# Patient Record
Sex: FEMALE | Race: WHITE | NOT HISPANIC OR LATINO | Employment: UNEMPLOYED | URBAN - NONMETROPOLITAN AREA
[De-identification: names, ages, dates, MRNs, and addresses within clinical notes are randomized per-mention and may not be internally consistent; named-entity substitution may affect disease eponyms.]

---

## 2017-01-10 ENCOUNTER — APPOINTMENT (EMERGENCY)
Dept: RADIOLOGY | Facility: HOSPITAL | Age: 42
End: 2017-01-10
Payer: COMMERCIAL

## 2017-01-10 ENCOUNTER — HOSPITAL ENCOUNTER (EMERGENCY)
Facility: HOSPITAL | Age: 42
Discharge: HOME | End: 2017-01-10
Attending: EMERGENCY MEDICINE | Admitting: EMERGENCY MEDICINE
Payer: COMMERCIAL

## 2017-01-10 VITALS
OXYGEN SATURATION: 97 % | BODY MASS INDEX: 28.49 KG/M2 | TEMPERATURE: 98.9 F | HEART RATE: 97 BPM | HEIGHT: 65 IN | RESPIRATION RATE: 17 BRPM | DIASTOLIC BLOOD PRESSURE: 81 MMHG | SYSTOLIC BLOOD PRESSURE: 134 MMHG | WEIGHT: 171 LBS
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
## 2017-01-10 DIAGNOSIS — M54.50 ACUTE LOW BACK PAIN: Primary | ICD-10-CM

## 2017-01-10 PROCEDURE — 99283 EMERGENCY DEPT VISIT LOW MDM: CPT

## 2017-01-10 PROCEDURE — 72220 X-RAY EXAM SACRUM TAILBONE: CPT

## 2017-01-10 PROCEDURE — 72100 X-RAY EXAM L-S SPINE 2/3 VWS: CPT

## 2017-01-10 RX ORDER — DIAZEPAM 5 MG/1
5 TABLET ORAL
Qty: 10 TABLET | Refills: 0 | Status: SHIPPED | OUTPATIENT
Start: 2017-01-10 | End: 2017-01-15 | Stop reason: ALTCHOICE

## 2017-01-10 RX ORDER — TRAMADOL HYDROCHLORIDE 50 MG/1
50 TABLET ORAL EVERY 6 HOURS PRN
Qty: 30 TABLET | Refills: 0 | Status: SHIPPED | OUTPATIENT
Start: 2017-01-10 | End: 2017-01-20

## 2017-01-10 RX ORDER — PREDNISONE 10 MG/1
40 TABLET ORAL DAILY
Qty: 20 TABLET | Refills: 0 | Status: SHIPPED | OUTPATIENT
Start: 2017-01-10 | End: 2017-01-15

## 2017-01-10 RX ORDER — TRAMADOL HYDROCHLORIDE 50 MG/1
100 TABLET ORAL ONCE
Status: COMPLETED | OUTPATIENT
Start: 2017-01-10 | End: 2017-01-10

## 2017-01-10 RX ADMIN — TRAMADOL HYDROCHLORIDE 100 MG: 50 TABLET, COATED ORAL at 10:59

## 2017-02-09 ENCOUNTER — HOSPITAL ENCOUNTER (EMERGENCY)
Facility: HOSPITAL | Age: 42
Discharge: HOME | End: 2017-02-09
Attending: EMERGENCY MEDICINE | Admitting: EMERGENCY MEDICINE
Payer: COMMERCIAL

## 2017-02-09 VITALS
SYSTOLIC BLOOD PRESSURE: 154 MMHG | DIASTOLIC BLOOD PRESSURE: 89 MMHG | RESPIRATION RATE: 18 BRPM | HEART RATE: 92 BPM | BODY MASS INDEX: 27.46 KG/M2 | OXYGEN SATURATION: 100 % | WEIGHT: 165 LBS | TEMPERATURE: 99 F

## 2017-02-09 DIAGNOSIS — J40 BRONCHITIS: Primary | ICD-10-CM

## 2017-02-09 PROCEDURE — 99283 EMERGENCY DEPT VISIT LOW MDM: CPT

## 2017-02-09 RX ORDER — TRAMADOL HYDROCHLORIDE 50 MG/1
50 TABLET ORAL EVERY 6 HOURS PRN
Qty: 15 TABLET | Refills: 0 | Status: SHIPPED | OUTPATIENT
Start: 2017-02-09 | End: 2017-04-14 | Stop reason: ALTCHOICE

## 2017-02-09 RX ORDER — DIAZEPAM 5 MG/1
5 TABLET ORAL 2 TIMES DAILY
Qty: 15 TABLET | Refills: 0 | Status: SHIPPED | OUTPATIENT
Start: 2017-02-09 | End: 2018-02-03

## 2017-02-09 RX ORDER — GUAIFENESIN 200 MG/1
200 TABLET ORAL EVERY 4 HOURS PRN
Qty: 15 TABLET | Refills: 0 | Status: SHIPPED | OUTPATIENT
Start: 2017-02-09 | End: 2017-04-14 | Stop reason: ALTCHOICE

## 2017-02-09 RX ORDER — AZITHROMYCIN 250 MG/1
TABLET, FILM COATED ORAL
Qty: 6 TABLET | Refills: 0 | Status: SHIPPED | OUTPATIENT
Start: 2017-02-09 | End: 2017-04-14 | Stop reason: ALTCHOICE

## 2017-04-14 ENCOUNTER — APPOINTMENT (EMERGENCY)
Dept: CT IMAGING | Facility: HOSPITAL | Age: 42
End: 2017-04-14
Payer: COMMERCIAL

## 2017-04-14 ENCOUNTER — HOSPITAL ENCOUNTER (EMERGENCY)
Facility: HOSPITAL | Age: 42
Discharge: HOME | End: 2017-04-14
Admitting: EMERGENCY MEDICINE
Payer: COMMERCIAL

## 2017-04-14 VITALS
BODY MASS INDEX: 29.99 KG/M2 | SYSTOLIC BLOOD PRESSURE: 127 MMHG | HEIGHT: 65 IN | HEART RATE: 105 BPM | TEMPERATURE: 98.7 F | WEIGHT: 180 LBS | DIASTOLIC BLOOD PRESSURE: 69 MMHG | OXYGEN SATURATION: 100 % | RESPIRATION RATE: 20 BRPM

## 2017-04-14 DIAGNOSIS — R35.0 URINARY FREQUENCY: Primary | ICD-10-CM

## 2017-04-14 LAB
ANION GAP SERPL CALCULATED.3IONS-SCNC: 8 MMOL/L (ref 4–13)
APTT PPP: 24 SECONDS (ref 24–36)
BACTERIA UR QL AUTO: ABNORMAL /HPF
BASOPHILS # BLD AUTO: 0.06 THOUSANDS/ΜL (ref 0–0.1)
BASOPHILS NFR BLD AUTO: 1 % (ref 0–1)
BILIRUB UR QL STRIP: NEGATIVE
BUN SERPL-MCNC: 10 MG/DL (ref 5–25)
CALCIUM SERPL-MCNC: 8.7 MG/DL (ref 8.3–10.1)
CHLORIDE SERPL-SCNC: 106 MMOL/L (ref 100–108)
CLARITY UR: ABNORMAL
CO2 SERPL-SCNC: 28 MMOL/L (ref 21–32)
COLOR UR: YELLOW
CREAT SERPL-MCNC: 0.66 MG/DL (ref 0.6–1.3)
EOSINOPHIL # BLD AUTO: 0.29 THOUSAND/ΜL (ref 0–0.61)
EOSINOPHIL NFR BLD AUTO: 4 % (ref 0–6)
ERYTHROCYTE [DISTWIDTH] IN BLOOD BY AUTOMATED COUNT: 12.7 % (ref 11.6–15.1)
GFR SERPL CREATININE-BSD FRML MDRD: >60 ML/MIN/1.73SQ M
GLUCOSE SERPL-MCNC: 109 MG/DL (ref 65–140)
GLUCOSE UR STRIP-MCNC: NEGATIVE MG/DL
HCG UR QL: NORMAL
HCT VFR BLD AUTO: 37 % (ref 34.8–46.1)
HGB BLD-MCNC: 12.6 G/DL (ref 11.5–15.4)
HGB UR QL STRIP.AUTO: ABNORMAL
INR PPP: 1 (ref 0.86–1.16)
KETONES UR STRIP-MCNC: NEGATIVE MG/DL
LACTATE SERPL-SCNC: 1.6 MMOL/L (ref 0.5–2)
LEUKOCYTE ESTERASE UR QL STRIP: NEGATIVE
LYMPHOCYTES # BLD AUTO: 2.88 THOUSANDS/ΜL (ref 0.6–4.47)
LYMPHOCYTES NFR BLD AUTO: 36 % (ref 14–44)
MCH RBC QN AUTO: 33.3 PG (ref 26.8–34.3)
MCHC RBC AUTO-ENTMCNC: 34.1 G/DL (ref 31.4–37.4)
MCV RBC AUTO: 98 FL (ref 82–98)
MONOCYTES # BLD AUTO: 0.53 THOUSAND/ΜL (ref 0.17–1.22)
MONOCYTES NFR BLD AUTO: 7 % (ref 4–12)
NEUTROPHILS # BLD AUTO: 4.21 THOUSANDS/ΜL (ref 1.85–7.62)
NEUTS SEG NFR BLD AUTO: 52 % (ref 43–75)
NITRITE UR QL STRIP: NEGATIVE
NON-SQ EPI CELLS URNS QL MICRO: ABNORMAL /HPF
PH UR STRIP.AUTO: 6 [PH] (ref 4.5–8)
PLATELET # BLD AUTO: 263 THOUSANDS/UL (ref 149–390)
PMV BLD AUTO: 10.4 FL (ref 8.9–12.7)
POTASSIUM SERPL-SCNC: 3.2 MMOL/L (ref 3.5–5.3)
PROT UR STRIP-MCNC: NEGATIVE MG/DL
PROTHROMBIN TIME: 13.1 SECONDS (ref 12–14.3)
RBC # BLD AUTO: 3.78 MILLION/UL (ref 3.81–5.12)
RBC #/AREA URNS AUTO: ABNORMAL /HPF
SODIUM SERPL-SCNC: 142 MMOL/L (ref 136–145)
SP GR UR STRIP.AUTO: 1.02 (ref 1–1.03)
UROBILINOGEN UR QL STRIP.AUTO: 0.2 E.U./DL
WBC # BLD AUTO: 7.97 THOUSAND/UL (ref 4.31–10.16)
WBC #/AREA URNS AUTO: ABNORMAL /HPF

## 2017-04-14 PROCEDURE — 87086 URINE CULTURE/COLONY COUNT: CPT | Performed by: PHYSICIAN ASSISTANT

## 2017-04-14 PROCEDURE — 85025 COMPLETE CBC W/AUTO DIFF WBC: CPT | Performed by: PHYSICIAN ASSISTANT

## 2017-04-14 PROCEDURE — 99284 EMERGENCY DEPT VISIT MOD MDM: CPT

## 2017-04-14 PROCEDURE — 80048 BASIC METABOLIC PNL TOTAL CA: CPT | Performed by: PHYSICIAN ASSISTANT

## 2017-04-14 PROCEDURE — 85730 THROMBOPLASTIN TIME PARTIAL: CPT | Performed by: PHYSICIAN ASSISTANT

## 2017-04-14 PROCEDURE — 96361 HYDRATE IV INFUSION ADD-ON: CPT

## 2017-04-14 PROCEDURE — 81001 URINALYSIS AUTO W/SCOPE: CPT | Performed by: PHYSICIAN ASSISTANT

## 2017-04-14 PROCEDURE — 83605 ASSAY OF LACTIC ACID: CPT | Performed by: PHYSICIAN ASSISTANT

## 2017-04-14 PROCEDURE — 85610 PROTHROMBIN TIME: CPT | Performed by: PHYSICIAN ASSISTANT

## 2017-04-14 PROCEDURE — 36415 COLL VENOUS BLD VENIPUNCTURE: CPT | Performed by: PHYSICIAN ASSISTANT

## 2017-04-14 PROCEDURE — 81025 URINE PREGNANCY TEST: CPT | Performed by: PHYSICIAN ASSISTANT

## 2017-04-14 PROCEDURE — 74176 CT ABD & PELVIS W/O CONTRAST: CPT

## 2017-04-14 PROCEDURE — 96374 THER/PROPH/DIAG INJ IV PUSH: CPT

## 2017-04-14 RX ORDER — POTASSIUM CHLORIDE 20 MEQ/1
20 TABLET, EXTENDED RELEASE ORAL ONCE
Status: COMPLETED | OUTPATIENT
Start: 2017-04-14 | End: 2017-04-14

## 2017-04-14 RX ORDER — IBUPROFEN 200 MG
400 TABLET ORAL ONCE
Status: COMPLETED | OUTPATIENT
Start: 2017-04-14 | End: 2017-04-14

## 2017-04-14 RX ORDER — ONDANSETRON 2 MG/ML
4 INJECTION INTRAMUSCULAR; INTRAVENOUS ONCE
Status: COMPLETED | OUTPATIENT
Start: 2017-04-14 | End: 2017-04-14

## 2017-04-14 RX ORDER — PHENAZOPYRIDINE HYDROCHLORIDE 100 MG/1
100 TABLET, FILM COATED ORAL ONCE
Status: COMPLETED | OUTPATIENT
Start: 2017-04-14 | End: 2017-04-14

## 2017-04-14 RX ORDER — NITROFURANTOIN 25; 75 MG/1; MG/1
100 CAPSULE ORAL 2 TIMES DAILY
Qty: 10 CAPSULE | Refills: 0 | Status: SHIPPED | OUTPATIENT
Start: 2017-04-14 | End: 2017-04-19

## 2017-04-14 RX ORDER — PHENAZOPYRIDINE HYDROCHLORIDE 100 MG/1
100 TABLET, FILM COATED ORAL 3 TIMES DAILY PRN
Qty: 6 TABLET | Refills: 0 | Status: SHIPPED | OUTPATIENT
Start: 2017-04-14 | End: 2017-04-16

## 2017-04-14 RX ADMIN — POTASSIUM CHLORIDE 20 MEQ: 1500 TABLET, EXTENDED RELEASE ORAL at 17:46

## 2017-04-14 RX ADMIN — SODIUM CHLORIDE 1000 ML: 0.9 INJECTION, SOLUTION INTRAVENOUS at 17:03

## 2017-04-14 RX ADMIN — PHENAZOPYRIDINE HYDROCHLORIDE 100 MG: 100 TABLET ORAL at 17:06

## 2017-04-14 RX ADMIN — ONDANSETRON 4 MG: 2 INJECTION INTRAMUSCULAR; INTRAVENOUS at 17:06

## 2017-04-14 RX ADMIN — IBUPROFEN 400 MG: 200 TABLET, FILM COATED ORAL at 17:07

## 2017-04-16 LAB — BACTERIA UR CULT: NORMAL

## 2018-02-03 ENCOUNTER — HOSPITAL ENCOUNTER (EMERGENCY)
Facility: HOSPITAL | Age: 43
Discharge: HOME | End: 2018-02-03
Attending: EMERGENCY MEDICINE | Admitting: EMERGENCY MEDICINE
Payer: COMMERCIAL

## 2018-02-03 VITALS
TEMPERATURE: 98 F | RESPIRATION RATE: 18 BRPM | OXYGEN SATURATION: 100 % | BODY MASS INDEX: 31.99 KG/M2 | DIASTOLIC BLOOD PRESSURE: 70 MMHG | HEART RATE: 82 BPM | SYSTOLIC BLOOD PRESSURE: 126 MMHG | HEIGHT: 65 IN | WEIGHT: 192 LBS

## 2018-02-03 DIAGNOSIS — R11.2 NAUSEA VOMITING AND DIARRHEA: Primary | ICD-10-CM

## 2018-02-03 DIAGNOSIS — R19.7 NAUSEA VOMITING AND DIARRHEA: Primary | ICD-10-CM

## 2018-02-03 LAB
ALBUMIN SERPL BCP-MCNC: 3.5 G/DL (ref 3.5–5)
ALP SERPL-CCNC: 46 U/L (ref 46–116)
ALT SERPL W P-5'-P-CCNC: 31 U/L (ref 12–78)
ANION GAP SERPL CALCULATED.3IONS-SCNC: 12 MMOL/L (ref 4–13)
AST SERPL W P-5'-P-CCNC: 18 U/L (ref 5–45)
BACTERIA UR QL AUTO: ABNORMAL /HPF
BASOPHILS # BLD AUTO: 0.05 THOUSANDS/ΜL (ref 0–0.1)
BASOPHILS NFR BLD AUTO: 1 % (ref 0–1)
BILIRUB SERPL-MCNC: 0.3 MG/DL (ref 0.2–1)
BILIRUB UR QL STRIP: NEGATIVE
BUN SERPL-MCNC: 9 MG/DL (ref 5–25)
CALCIUM SERPL-MCNC: 8.4 MG/DL (ref 8.3–10.1)
CHLORIDE SERPL-SCNC: 103 MMOL/L (ref 100–108)
CLARITY UR: NORMAL
CO2 SERPL-SCNC: 23 MMOL/L (ref 21–32)
COLOR UR: YELLOW
CREAT SERPL-MCNC: 0.76 MG/DL (ref 0.6–1.3)
EOSINOPHIL # BLD AUTO: 0.22 THOUSAND/ΜL (ref 0–0.61)
EOSINOPHIL NFR BLD AUTO: 2 % (ref 0–6)
ERYTHROCYTE [DISTWIDTH] IN BLOOD BY AUTOMATED COUNT: 12.3 % (ref 11.6–15.1)
EXT PREG TEST URINE: NEGATIVE
GFR SERPL CREATININE-BSD FRML MDRD: 97 ML/MIN/1.73SQ M
GLUCOSE SERPL-MCNC: 87 MG/DL (ref 65–140)
GLUCOSE UR STRIP-MCNC: NEGATIVE MG/DL
HCT VFR BLD AUTO: 39.3 % (ref 34.8–46.1)
HGB BLD-MCNC: 13.8 G/DL (ref 11.5–15.4)
HGB UR QL STRIP.AUTO: NORMAL
KETONES UR STRIP-MCNC: NEGATIVE MG/DL
LEUKOCYTE ESTERASE UR QL STRIP: NEGATIVE
LYMPHOCYTES # BLD AUTO: 2.94 THOUSANDS/ΜL (ref 0.6–4.47)
LYMPHOCYTES NFR BLD AUTO: 31 % (ref 14–44)
MAGNESIUM SERPL-MCNC: 1.8 MG/DL (ref 1.6–2.6)
MCH RBC QN AUTO: 33 PG (ref 26.8–34.3)
MCHC RBC AUTO-ENTMCNC: 35.1 G/DL (ref 31.4–37.4)
MCV RBC AUTO: 94 FL (ref 82–98)
MONOCYTES # BLD AUTO: 0.51 THOUSAND/ΜL (ref 0.17–1.22)
MONOCYTES NFR BLD AUTO: 5 % (ref 4–12)
NEUTROPHILS # BLD AUTO: 5.83 THOUSANDS/ΜL (ref 1.85–7.62)
NEUTS SEG NFR BLD AUTO: 61 % (ref 43–75)
NITRITE UR QL STRIP: NEGATIVE
NON-SQ EPI CELLS URNS QL MICRO: ABNORMAL /HPF
PH UR STRIP.AUTO: 6 [PH] (ref 4.5–8)
PLATELET # BLD AUTO: 254 THOUSANDS/UL (ref 149–390)
PMV BLD AUTO: 10.3 FL (ref 8.9–12.7)
POTASSIUM SERPL-SCNC: 3.9 MMOL/L (ref 3.5–5.3)
PROT SERPL-MCNC: 7 G/DL (ref 6.4–8.2)
PROT UR STRIP-MCNC: NEGATIVE MG/DL
RBC # BLD AUTO: 4.18 MILLION/UL (ref 3.81–5.12)
RBC #/AREA URNS AUTO: ABNORMAL /HPF
SODIUM SERPL-SCNC: 138 MMOL/L (ref 136–145)
SP GR UR STRIP.AUTO: 1.02 (ref 1–1.03)
UROBILINOGEN UR QL STRIP.AUTO: 0.2 E.U./DL
WBC # BLD AUTO: 9.55 THOUSAND/UL (ref 4.31–10.16)
WBC #/AREA URNS AUTO: ABNORMAL /HPF

## 2018-02-03 PROCEDURE — 96374 THER/PROPH/DIAG INJ IV PUSH: CPT

## 2018-02-03 PROCEDURE — 36415 COLL VENOUS BLD VENIPUNCTURE: CPT | Performed by: EMERGENCY MEDICINE

## 2018-02-03 PROCEDURE — 80053 COMPREHEN METABOLIC PANEL: CPT | Performed by: EMERGENCY MEDICINE

## 2018-02-03 PROCEDURE — 99283 EMERGENCY DEPT VISIT LOW MDM: CPT

## 2018-02-03 PROCEDURE — 96361 HYDRATE IV INFUSION ADD-ON: CPT

## 2018-02-03 PROCEDURE — 85025 COMPLETE CBC W/AUTO DIFF WBC: CPT | Performed by: EMERGENCY MEDICINE

## 2018-02-03 PROCEDURE — 81001 URINALYSIS AUTO W/SCOPE: CPT | Performed by: EMERGENCY MEDICINE

## 2018-02-03 PROCEDURE — 81025 URINE PREGNANCY TEST: CPT | Performed by: EMERGENCY MEDICINE

## 2018-02-03 PROCEDURE — 83735 ASSAY OF MAGNESIUM: CPT | Performed by: EMERGENCY MEDICINE

## 2018-02-03 RX ORDER — ONDANSETRON 2 MG/ML
4 INJECTION INTRAMUSCULAR; INTRAVENOUS ONCE
Status: COMPLETED | OUTPATIENT
Start: 2018-02-03 | End: 2018-02-03

## 2018-02-03 RX ORDER — HYDROCODONE BITARTRATE AND ACETAMINOPHEN 10; 300 MG/1; MG/1
1 TABLET ORAL EVERY 6 HOURS PRN
COMMUNITY
End: 2018-09-10

## 2018-02-03 RX ORDER — DEXLANSOPRAZOLE 30 MG/1
30 CAPSULE, DELAYED RELEASE ORAL DAILY
COMMUNITY
End: 2019-12-27

## 2018-02-03 RX ORDER — ONDANSETRON 4 MG/1
4 TABLET, FILM COATED ORAL EVERY 12 HOURS PRN
Qty: 4 TABLET | Refills: 0 | Status: SHIPPED | OUTPATIENT
Start: 2018-02-03 | End: 2018-09-10

## 2018-02-03 RX ORDER — TRAMADOL HYDROCHLORIDE 50 MG/1
50 TABLET ORAL 2 TIMES DAILY
COMMUNITY
End: 2018-09-10

## 2018-02-03 RX ADMIN — SODIUM CHLORIDE 1000 ML: 0.9 INJECTION, SOLUTION INTRAVENOUS at 15:38

## 2018-02-03 RX ADMIN — ONDANSETRON HYDROCHLORIDE 4 MG: 2 INJECTION, SOLUTION INTRAVENOUS at 15:48

## 2018-02-03 NOTE — ED PROVIDER NOTES
History  Chief Complaint   Patient presents with    Vomiting     Pt states n/v/d since yesterday morning  Pt has not taken anything to help symptoms and believes she had a fever because she felt warm but had chills  Patient is a 80-year-old female with a past medical history of Hodgkin's of coma status post radiation chemo in remission since 2009 coming in today with nausea, vomiting, diarrhea 1-2 days  Patient was in her normal state health until that time  Patient reports the vomiting started 1st and then the diarrhea  She has vomited and had multiple episodes of diarrhea greater than 10-20 episodes  There has been subjective fevers and chills as well as myalgias  She denies any recent travel, sick contacts or recent antibiotic use  She denies any hematemesis, hemoptysis, bright red blood per rectum or melena  She has tried taking Gatorade and broth however vomits it right back up  She reports of diffuse abdominal discomfort that happened after multiple episodes of vomiting and diarrhea  She did not have any abdominal pain prior  She denies any chest pain or shortness of breath  She has been urinating well  No vaginal bleeding spotting or discharge          History provided by:  Patient   used: No    Vomiting   Severity:  Mild  Duration:  2 days  Timing:  Intermittent  Quality:  Stomach contents  Progression:  Unchanged  Chronicity:  New  Recent urination:  Normal  Context: not post-tussive and not self-induced    Relieved by:  Nothing  Worsened by:  Nothing  Ineffective treatments:  None tried  Associated symptoms: chills, diarrhea and myalgias    Associated symptoms: no abdominal pain, no arthralgias, no cough, no fever, no headaches, no sore throat and no URI    Risk factors: no alcohol use, no diabetes, not pregnant, no prior abdominal surgery, no sick contacts, no suspect food intake and no travel to endemic areas        Prior to Admission Medications   Prescriptions Last Dose Informant Patient Reported? Taking? HYDROcodone-acetaminophen (XODOL )  MG per tablet   Yes Yes   Sig: Take 1 tablet by mouth every 6 (six) hours as needed for moderate pain   dexlansoprazole (DEXILANT) 30 MG capsule   Yes Yes   Sig: Take 30 mg by mouth daily   traMADol (ULTRAM) 50 mg tablet   Yes Yes   Sig: Take 50 mg by mouth 2 (two) times a day      Facility-Administered Medications: None       Past Medical History:   Diagnosis Date    Hodgkin's lymphoma ([facility] [address] )     Migraine     Unspecified ovarian cysts        Past Surgical History:   Procedure Laterality Date    APPENDECTOMY      CHOLECYSTECTOMY      HYSTERECTOMY      LYMPH NODE BIOPSY      left neck    MEDIPORT INSERTION, SINGLE      MEDIPORT REMOVAL      TUBAL LIGATION         History reviewed  No pertinent family history  I have reviewed and agree with the history as documented  Social History   Substance Use Topics    Smoking status: Current Every Day Smoker     Packs/day: 0 50     Types: Cigarettes    Smokeless tobacco: Never Used    Alcohol use No        Review of Systems   Constitutional: Positive for chills  Negative for fever  HENT: Negative for sore throat  Respiratory: Negative for cough and wheezing  Cardiovascular: Negative for chest pain and palpitations  Gastrointestinal: Positive for diarrhea, nausea and vomiting  Negative for abdominal distention and abdominal pain  Genitourinary: Negative for frequency  Musculoskeletal: Positive for myalgias  Negative for arthralgias  Neurological: Negative for weakness and headaches  Psychiatric/Behavioral: Negative for confusion  All other systems reviewed and are negative        Physical Exam  ED Triage Vitals [02/03/18 1512]   Temperature Pulse Respirations Blood Pressure SpO2   98 °F (36 7 °C) 99 18 134/80 100 %      Temp Source Heart Rate Source Patient Position - Orthostatic VS BP Location FiO2 (%)   Temporal Monitor Sitting Right arm --      Pain Score       5           Orthostatic Vital Signs  Vitals:    02/03/18 1512   BP: 134/80   Pulse: 99   Patient Position - Orthostatic VS: Sitting       Physical Exam   Constitutional: She is oriented to person, place, and time  She appears well-developed and well-nourished  No distress  HENT:   Head: Normocephalic and atraumatic  Dry mucous membranes  Oropharynx clear  Uvula midline without any edema  Eyes: Conjunctivae and EOM are normal  Pupils are equal, round, and reactive to light  Neck: Normal range of motion  Neck supple  No tracheal deviation present  Cardiovascular: Regular rhythm, normal heart sounds and intact distal pulses  Tachycardia present  No murmur heard  Pulmonary/Chest: Effort normal and breath sounds normal  No stridor  No respiratory distress  She has no wheezes  She exhibits no tenderness  Abdominal: Soft  Bowel sounds are normal  She exhibits no distension and no mass  There is generalized tenderness  There is no rebound and no guarding  No hernia  Musculoskeletal: Normal range of motion  She exhibits no edema  Neurological: She is alert and oriented to person, place, and time  She displays normal reflexes  No cranial nerve deficit or sensory deficit  She exhibits normal muscle tone  Coordination normal    Skin: Skin is warm  Capillary refill takes less than 2 seconds  She is not diaphoretic  Psychiatric: She has a normal mood and affect  Her behavior is normal    Nursing note and vitals reviewed        ED Medications  Medications   sodium chloride 0 9 % bolus 1,000 mL (1,000 mL Intravenous New Bag 2/3/18 1538)   ondansetron (ZOFRAN) injection 4 mg (4 mg Intravenous Given 2/3/18 1548)       Diagnostic Studies  Results Reviewed     Procedure Component Value Units Date/Time    Comprehensive metabolic panel [[id number]] Collected:  02/03/18 1528    Lab Status:  Final result Specimen:  Blood from Arm, Left Updated:  02/03/18 1621     Sodium 138 mmol/L      Potassium 3 9 mmol/L Chloride 103 mmol/L      CO2 23 mmol/L      Anion Gap 12 mmol/L      BUN 9 mg/dL      Creatinine 0 76 mg/dL      Glucose 87 mg/dL      Calcium 8 4 mg/dL      AST 18 U/L      ALT 31 U/L      Alkaline Phosphatase 46 U/L      Total Protein 7 0 g/dL      Albumin 3 5 g/dL      Total Bilirubin 0 30 mg/dL      eGFR 97 ml/min/1 73sq m     Narrative:         National Kidney Disease Education Program recommendations are as follows:  GFR calculation is accurate only with a steady state creatinine  Chronic Kidney disease less than 60 ml/min/1 73 sq  meters  Kidney failure less than 15 ml/min/1 73 sq  meters      Magnesium [[id number]]  (Normal) Collected:  02/03/18 1528    Lab Status:  Final result Specimen:  Blood from Arm, Left Updated:  02/03/18 1621     Magnesium 1 8 mg/dL     Urine Microscopic [[id number]]  (Abnormal) Collected:  02/03/18 1558    Lab Status:  Final result Specimen:  Urine from Urine, Clean Catch Updated:  02/03/18 1609     RBC, UA 0-1 (A) /hpf      WBC, UA None Seen /hpf      Epithelial Cells Occasional /hpf      Bacteria, UA Occasional /hpf     UA w Reflex to Microscopic w Reflex to Culture [[id number]]  (Normal) Collected:  02/03/18 1558    Lab Status:  Final result Specimen:  Urine from Urine, Clean Catch Updated:  02/03/18 1608     Color, UA Yellow     Clarity, UA Slightly Cloudy     Specific Gravity, UA 1 025     pH, UA 6 0     Leukocytes, UA Negative     Nitrite, UA Negative     Protein, UA Negative mg/dl      Glucose, UA Negative mg/dl      Ketones, UA Negative mg/dl      Urobilinogen, UA 0 2 E U /dl      Bilirubin, UA Negative     Blood, UA Trace-Intact    CBC and differential [[id number]]  (Normal) Collected:  02/03/18 1528    Lab Status:  Final result Specimen:  Blood from Arm, Left Updated:  02/03/18 1607     WBC 9 55 Thousand/uL      RBC 4 18 Million/uL      Hemoglobin 13 8 g/dL      Hematocrit 39 3 %      MCV 94 fL      MCH 33 0 pg      MCHC 35 1 g/dL      RDW 12 3 %      MPV 10 3 fL Platelets 322 Thousands/uL      Neutrophils Relative 61 %      Lymphocytes Relative 31 %      Monocytes Relative 5 %      Eosinophils Relative 2 %      Basophils Relative 1 %      Neutrophils Absolute 5 83 Thousands/µL      Lymphocytes Absolute 2 94 Thousands/µL      Monocytes Absolute 0 51 Thousand/µL      Eosinophils Absolute 0 22 Thousand/µL      Basophils Absolute 0 05 Thousands/µL     POCT pregnancy, urine [[id number]]  (Normal) Resulted:  02/03/18 1555    Lab Status:  Final result Updated:  02/03/18 1556     EXT PREG TEST UR (Ref: Negative) negative                 No orders to display              Procedures  Procedures       Phone Contacts  ED Phone Contact    ED Course  ED Course                                MDM  Number of Diagnoses or Management Options  Nausea vomiting and diarrhea: new and requires workup  Diagnosis management comments: Patient is a 68-year-old female in remission from Hodgkin's lymphoma since 2090 coming in today with nausea vomiting and diarrhea for 1-2 days  On exam patient appears clinically dehydrated  Discussed with patient will obtain CBC CMP magnesium as well as urine  Will also give IV fluids and Zofran  Discussed with patient at this time on history and physical clinically patient does not clinically have pneumonia as lungs clear with no cough for productive cough  Patient does present with viral gastroenteritis at this time and will check electrolytes as well as provide hydration  Patient is agreeable touch  Patient is nontoxic appearing with a soft abdomen non peritoneal     4:24 PM  Laboratory data within normal limits and stable including urine  Patient without any vomiting or diarrhea while in the ER  Reassessment patient remains nontoxic appearing and discussed with her regarding brat diet as well as rest and symptomatic treatment with Motrin Tylenol  Patient has L fluid to admission will finish this and discharged home    Return to ER instructions given as well as follow up with PCP    4:34 PM  Patient does not wish to finish IVF and wants to go home  Will dc home  Patient asking for zofran rx  Will provide 4 tablets  Amount and/or Complexity of Data Reviewed  Clinical lab tests: ordered and reviewed  Tests in the medicine section of CPT®: reviewed and ordered      CritCare Time    Disposition  Final diagnoses:   Nausea vomiting and diarrhea     Time reflects when diagnosis was documented in both MDM as applicable and the Disposition within this note     Time User Action Codes Description Comment    2/3/2018  3:26 PM Galina Layton Add [R11 2,  R19 7] Nausea vomiting and diarrhea       ED Disposition     ED Disposition Condition Comment    Discharge  Elvia Londono Sis discharge to home/self care  Condition at discharge: Stable        Follow-up Information     Follow up With Specialties Details Why Contact Info    James Medrano DO  Schedule an appointment as soon as possible for a visit in 3 days  [address] [facility]  [address] [place] [id number]  [phone]          Patient's Medications   Discharge Prescriptions    ONDANSETRON (ZOFRAN) 4 MG TABLET    Take 1 tablet (4 mg total) by mouth every 12 (twelve) hours as needed for nausea or vomiting for up to 2 days       Start Date: 2/3/2018  End Date: 2/5/2018       Order Dose: 4 mg       Quantity: 4 tablet    Refills: 0     No discharge procedures on file      ED Provider  Electronically Signed by           Stephen Santo DO  02/03/18 2757

## 2018-02-03 NOTE — DISCHARGE INSTRUCTIONS
B  R  A  T  DIET Your doctor has recommended the B R A T  diet for you or your child until his or her condition improves  This is often used to help control diarrhea and vomiting symptoms  If you or your child can tolerate clear liquids, you may offer: · Bananas · Rice · Applesauce · Toast (and other simple starches such as crackers, potatoes, noodles) Be sure to avoid dairy products, meats, and fatty foods until your child's symptoms are completely better  Acute Diarrhea   WHAT YOU NEED TO KNOW:   What is acute diarrhea? Acute diarrhea starts quickly and lasts a short time, usually 1 to 3 days  It can last up to 2 weeks  What causes acute diarrhea? · Bacteria, such as E coli or salmonella    · Viruses, such as rotavirus and norovirus    · A parasite, such as giardia    · Medicines, such as laxatives, antacids, or antibiotics    · An allergy to lactose, soy, or gluten    · Eating food or drinking water that contains germs    · Medical treatments, such as chemotherapy or radiation  What other signs and symptoms might I have with acute diarrhea? You may have 3 or more episodes of diarrhea  It may be hard to control your diarrhea  You may also have any of the following:  · Fever and chills    · Headache or abdominal pain    · Nausea and vomiting    · Symptoms of dehydration such as thirst, decreased urination, dry skin, sunken eyes, or fast, pounding heartbeat  What does my healthcare provider need to know about my acute diarrhea? Your healthcare provider will ask about your symptoms  He or she will ask what you have recently eaten and if you have traveled to other countries  Tell the provider what medicines you use or if you have been around anyone who is sick  Your healthcare provider may check you for signs of dehydration  How is acute diarrhea treated? Acute diarrhea usually gets better without treatment   You may need any of the following if your diarrhea is severe or lasts longer than a few days:  · Diarrhea medicine  is an over-the-counter medicine that helps slow or stop your diarrhea  · Antibiotics  may be given to help treat an infection caused by bacteria  · Parasite medicine  may be given to treat an infection caused by parasites  How can acute diarrhea be managed? · Drink liquids as directed  Liquids will help prevent dehydration caused by diarrhea  Ask your healthcare provider how much liquid to drink each day and which liquids are best for you  You may need to drink an oral rehydration solution (ORS)  An ORS has the right amounts of water, salts, and sugar you need to replace body fluids  You can buy an ORS at most grocery stores and pharmacies  · Eat foods that are easy to digest   Examples include rice, lentils, cereal, bananas, potatoes, and bread  It also includes some fruits (bananas, melon), well-cooked vegetables, and lean meats  Avoid foods high in fiber, fat, and sugar  Also avoid caffeine, alcohol, dairy, and red meat until your diarrhea is gone  How can acute diarrhea be prevented? · Wash your hands often  Use soap and water  Wash your hands before you eat or prepare food  Also wash your hands after you use the bathroom  Use an alcohol-based hand gel when soap and water are not available  · Keep bathroom surfaces clean  This helps prevent the spread of germs that cause acute diarrhea  · Wash fruits and vegetables well before you eat them  This can help remove germs that cause diarrhea  If possible, remove the skin from fruits and vegetables, or cook them well before you eat them  · Cook meat as directed  ¨ Cook ground meat  to 160°F      ¨ Cook ground poultry, whole poultry, or cuts of poultry  to at least 165°F  Remove the meat from heat  Let it stand for 3 minutes before you eat it  ¨ Cook whole cuts of meat other than poultry  to at least 145°F  Remove the meat from heat  Let it stand for 3 minutes before you eat it      · Wash dishes that have touched raw meat with hot water and soap  This includes cutting boards, utensils, dishes, and serving containers  · Place raw or cooked meat in the refrigerator as soon as possible  Bacteria can grow in meat that is left at room temperature too long  · Do not eat raw or undercooked oysters, clams, or mussels  These foods may be contaminated and cause infection  · Drink filtered or treated water only when you travel  Do not put ice in your drinks  Drink bottled water whenever possible  When should I seek immediate care? · You feel confused  · Your heartbeat is faster than normal      · Your eyes look deeply sunken, or you have no tears when you cry  · You urinate less than usual, or your urine is dark yellow  · You have blood or mucus in your stools  · You have severe abdominal pain  · You are unable to drink any liquids  When should I contact my healthcare provider? · Your symptoms do not get better with treatment  · You have a fever higher than 101 3°F (38 5°C)  · You have trouble eating and drinking because you are vomiting  · You are thirsty or have a dry mouth  · Your diarrhea does not get better in 7 days  · You have questions or concerns about your condition or care  CARE AGREEMENT:   You have the right to help plan your care  Learn about your health condition and how it may be treated  Discuss treatment options with your caregivers to decide what care you want to receive  You always have the right to refuse treatment  The above information is an  only  It is not intended as medical advice for individual conditions or treatments  Talk to your doctor, nurse or pharmacist before following any medical regimen to see if it is safe and effective for you  © 2017 [employer] Information is for End User's use only and may not be sold, redistributed or otherwise used for commercial purposes   All illustrations and images included in CareNotes® are the copyrighted property of A [employer] M [employer]  or Ben[employer] Morris  Acute Nausea and Vomiting, Ambulatory Care   GENERAL INFORMATION:   Acute nausea and vomiting  starts suddenly, gets worse quickly, and lasts a short time  Nausea and vomiting may be caused by pregnancy, alcohol, infection, or medicines  Common related symptoms include the following:   · Fever    · Abdominal swelling    · Pain, tenderness, or a lump in the abdomen    · Splashing sounds heard in your stomach when you move  Seek immediate care for the following symptoms:   · Blood in your vomit or bowel movements    · Sudden, severe pain in your chest and upper abdomen after hard vomiting    · Dizziness, dry mouth, and thirst    · Urinating very little or not at all    · Muscle weakness, leg cramps, and trouble breathing    · A heart beat that is faster than normal    · Vomiting for more than 48 hours  Treatment for acute nausea and vomiting  may include medicines to calm your stomach and stop the vomiting  You may need IV fluids if you are dehydrated  Manage your nausea and vomiting:   · Drink liquids as directed to prevent dehydration  Ask how much liquid to drink each day and which liquids are best for you  You may need to drink an oral rehydration solution (ORS)  ORS contains water, salts, and sugar that are needed to replace the lost body fluids  Ask what kind of ORS to use, how much to drink, and where to get it  · Eat smaller meals, more often  Eat small amounts of food every 2 to 3 hours, even if you are not hungry  Food in your stomach may help decrease your nausea  · Avoid stress  Find ways to relax and manage your stress  Headaches due to stress may cause nausea and vomiting  Get more rest and sleep  Follow up with your healthcare provider as directed:  Write down your questions so you remember to ask them during your visits  CARE AGREEMENT:   You have the right to help plan your care  Learn about your health condition and how it may be treated  Discuss treatment options with your caregivers to decide what care you want to receive  You always have the right to refuse treatment  The above information is an  only  It is not intended as medical advice for individual conditions or treatments  Talk to your doctor, nurse or pharmacist before following any medical regimen to see if it is safe and effective for you  © 2014 [address] is for End User's use only and may not be sold, redistributed or otherwise used for commercial purposes  All illustrations and images included in CareNotes® are the copyrighted property of [employer] M [employer]  or [employer]  Acute Nausea and Vomiting   WHAT YOU NEED TO KNOW:   Acute nausea and vomiting start suddenly, worsen quickly, and last a short time  DISCHARGE INSTRUCTIONS:   Seek care immediately if:   · You see blood in your vomit or your bowel movements  · You have sudden, severe pain in your chest and upper abdomen after hard vomiting or retching  · You have swelling in your neck and chest      · You are dizzy, cold, and thirsty and your eyes and mouth are dry  · You are urinating very little or not at all  · You have muscle weakness, leg cramps, and trouble breathing  · Your heart is beating much faster than normal      · You continue to vomit for more than 48 hours  Contact your healthcare provider if:   · You have frequent dry heaves (vomiting but nothing comes out)  · Your nausea and vomiting does not get better or go away after you use medicine  · You have questions or concerns about your condition or treatment  Medicines: You may need any of the following:  · Medicines  may be given to calm your stomach and stop your vomiting  You may also need medicines to help you feel more relaxed or to stop nausea and vomiting caused by motion sickness      · Gastrointestinal stimulants  are used to help empty your stomach and bowels  This may help decrease nausea and vomiting  · Take your medicine as directed  Contact your healthcare provider if you think your medicine is not helping or if you have side effects  Tell him or her if you are allergic to any medicine  Keep a list of the medicines, vitamins, and herbs you take  Include the amounts, and when and why you take them  Bring the list or the pill bottles to follow-up visits  Carry your medicine list with you in case of an emergency  Prevent or manage acute nausea and vomiting:   · Do not drink alcohol  Alcohol may upset or irritate your stomach  Too much alcohol can also cause acute nausea and vomiting  · Control stress  Headaches due to stress may cause nausea and vomiting  Find ways to relax and manage your stress  Get more rest and sleep  · Drink more liquids as directed  Vomiting can lead to dehydration  It is important to drink more liquids to help replace lost body fluids  Ask your healthcare provider how much liquid to drink each day and which liquids are best for you  Your provider may recommend that you drink an oral rehydration solution (ORS)  ORS contains water, salts, and sugar that are needed to replace the lost body fluids  Ask what kind of ORS to use, how much to drink, and where to get it  · Eat smaller meals, more often  Eat small amounts of food every 2 to 3 hours, even if you are not hungry  Food in your stomach may decrease your nausea  · Talk to your healthcare provider before you take over-the-counter (OTC) medicines  These medicines can cause serious problems if you use certain other medicines, or you have a medical condition  You may have problems if you use too much or use them for longer than the label says  Follow directions on the label carefully  Follow up with your healthcare provider as directed:  Write down your questions so you remember to ask them during your visits    © 2017 [employer] [address] [facility] is for End User's use only and may not be sold, redistributed or otherwise used for commercial purposes  All illustrations and images included in CareNotes® are the copyrighted property of [employer] M [employer] Inc  or Benito Morris  The above information is an  only  It is not intended as medical advice for individual conditions or treatments  Talk to your doctor, nurse or pharmacist before following any medical regimen to see if it is safe and effective for you

## 2018-04-16 ENCOUNTER — HOSPITAL ENCOUNTER (EMERGENCY)
Facility: HOSPITAL | Age: 43
Discharge: LEFT BEFORE DISCHARGE | End: 2018-04-16
Attending: EMERGENCY MEDICINE
Payer: COMMERCIAL

## 2018-04-16 VITALS
HEART RATE: 118 BPM | HEIGHT: 65 IN | DIASTOLIC BLOOD PRESSURE: 100 MMHG | BODY MASS INDEX: 34.32 KG/M2 | SYSTOLIC BLOOD PRESSURE: 137 MMHG | RESPIRATION RATE: 20 BRPM | OXYGEN SATURATION: 100 % | WEIGHT: 206 LBS | TEMPERATURE: 99.4 F

## 2018-04-16 DIAGNOSIS — R10.9 ABDOMINAL PAIN: ICD-10-CM

## 2018-04-16 DIAGNOSIS — M54.9 BACK PAIN: Primary | ICD-10-CM

## 2018-04-16 PROCEDURE — 99284 EMERGENCY DEPT VISIT MOD MDM: CPT

## 2018-04-16 NOTE — ED PROVIDER NOTES
History  Chief Complaint   Patient presents with    Back Pain     lumbear baclk pain radiating down left leg  Hit in back with grocery cart  In pain management  26-year-old female with a longstanding history of pain who sees pain management and has a narcotic contract presents complaining of pain in the left flank which started on Friday afternoon when she was struck in the back by a grocery cart at [address]  Patient states she is out of her narcotic medications and presented to the emergency department  Patient has no side cauda equina symptoms  She has got no midline spinal tenderness  She states that she missed her last pain management appointment and that is why she does not have any narcotic analgesia at home        History provided by:  Patient  Back Pain   Location:  Lumbar spine  Quality:  Stiffness and cramping  Pain severity:  Moderate  Pain is:  Same all the time  Onset quality:  Gradual  Duration:  72 hours  Timing:  Intermittent  Progression:  Waxing and waning  Chronicity:  New  Context: not emotional stress, not falling, not jumping from heights and not lifting heavy objects    Relieved by:  Nothing  Worsened by:  Nothing  Ineffective treatments:  None tried  Associated symptoms: no abdominal pain, no abdominal swelling, no bladder incontinence, no bowel incontinence, no chest pain, no dysuria and no headaches    Risk factors: no hx of cancer and no hx of osteoporosis        Prior to Admission Medications   Prescriptions Last Dose Informant Patient Reported? Taking?    HYDROcodone-acetaminophen (XODOL )  MG per tablet   Yes Yes   Sig: Take 1 tablet by mouth every 6 (six) hours as needed for moderate pain   dexlansoprazole (DEXILANT) 30 MG capsule   Yes Yes   Sig: Take 30 mg by mouth daily   ondansetron (ZOFRAN) 4 mg tablet   No No   Sig: Take 1 tablet (4 mg total) by mouth every 12 (twelve) hours as needed for nausea or vomiting for up to 2 days   traMADol (ULTRAM) 50 mg tablet Yes Yes   Sig: Take 50 mg by mouth 2 (two) times a day      Facility-Administered Medications: None       Past Medical History:   Diagnosis Date    Hodgkin's lymphoma ([address] )     Migraine     Unspecified ovarian cysts        Past Surgical History:   Procedure Laterality Date    APPENDECTOMY      CHOLECYSTECTOMY      HYSTERECTOMY      LYMPH NODE BIOPSY      left neck    MEDIPORT INSERTION, SINGLE      MEDIPORT REMOVAL      TUBAL LIGATION         History reviewed  No pertinent family history  I have reviewed and agree with the history as documented  Social History   Substance Use Topics    Smoking status: Former Smoker     Packs/day: 0 50     Types: Cigarettes    Smokeless tobacco: Never Used      Comment: quit 2 month ago   Alcohol use No        Review of Systems   Constitutional: Negative for activity change, appetite change and chills  HENT: Negative for congestion, dental problem, drooling and ear discharge  Eyes: Negative for pain, discharge and itching  Respiratory: Negative for apnea, choking and chest tightness  Cardiovascular: Negative for chest pain  Gastrointestinal: Negative for abdominal pain and bowel incontinence  Endocrine: Negative for cold intolerance, heat intolerance, polydipsia and polyphagia  Genitourinary: Negative for bladder incontinence, difficulty urinating, dyspareunia and dysuria  Musculoskeletal: Positive for back pain  Negative for gait problem, joint swelling and myalgias  Skin: Negative for color change, pallor and rash  Allergic/Immunologic: Negative for environmental allergies and food allergies  Neurological: Negative for dizziness, facial asymmetry and headaches  Hematological: Negative for adenopathy  Psychiatric/Behavioral: Negative for agitation, behavioral problems and confusion         Physical Exam  ED Triage Vitals [04/16/18 1007]   Temperature Pulse Respirations Blood Pressure SpO2   99 4 °F (37 4 °C) (!) 118 20 137/100 100 % Temp Source Heart Rate Source Patient Position - Orthostatic VS BP Location FiO2 (%)   Temporal Monitor Sitting Right arm --      Pain Score       8           Orthostatic Vital Signs  Vitals:    04/16/18 1007   BP: 137/100   Pulse: (!) 118   Patient Position - Orthostatic VS: Sitting       Physical Exam   Constitutional: She appears well-developed  HENT:   Head: Normocephalic  Right Ear: External ear normal    Left Ear: External ear normal    Eyes: Pupils are equal, round, and reactive to light  Right eye exhibits no discharge  Left eye exhibits no discharge  Neck: Normal range of motion  No JVD present  No tracheal deviation present  No thyromegaly present  Cardiovascular: Normal rate, regular rhythm and normal heart sounds  Pulmonary/Chest: Effort normal  No respiratory distress  She has no wheezes  She has no rales  Abdominal: Soft  She exhibits no distension  There is no tenderness  There is no guarding  Musculoskeletal: She exhibits no edema, tenderness or deformity  Tenderness to palpation to the left flank region and paraspinal musculature at L2-3    Neurological: She is alert  She displays normal reflexes  No cranial nerve deficit  She exhibits normal muscle tone  Coordination normal    Skin: Skin is warm  Capillary refill takes less than 2 seconds  No rash noted  No erythema  Psychiatric: She has a normal mood and affect   Her behavior is normal  Thought content normal        ED Medications  Medications   sodium chloride 0 9 % bolus 1,000 mL (not administered)       Diagnostic Studies  Results Reviewed     Procedure Component Value Units Date/Time    CBC and differential [[id number]]     Lab Status:  No result Specimen:  Blood     Comprehensive metabolic panel [[id number]]     Lab Status:  No result Specimen:  Blood     UA w Reflex to Microscopic w Reflex to Culture [[id number]]     Lab Status:  No result Specimen:  Urine     POCT pregnancy, urine [[id number]]     Lab Status:  No result CT abdomen pelvis with contrast    (Results Pending)   CT lumbar spine without contrast    (Results Pending)              Procedures  Procedures       Phone Contacts  ED Phone Contact    ED Course  ED Course                                MDM  Number of Diagnoses or Management Options  Diagnosis management comments: Differential diagnosis 1  Spinal fracture 2  Lumbar contusion 3  Lumbar strain  I will perform CT scan to evaluate for renal injury along with musculoskeletal injury  Patient this time is under narcotic contract she also has several allergies to IV pain medications so at this time would prefer p o  once her CT scans are completed  10:30  I did offer to give the patient and Ultram by mouth when her evaluation was complete  Patient became angry when she was told that she was not going to get a script for Ultram   I explained that it was a scheduled medication and that because she has a valid narcotic contract with pain management I could not give her long-term prescription  At that point patient decided she did not want CT scans or labs and wished to go home so she signed out against medical advice         Amount and/or Complexity of Data Reviewed  Clinical lab tests: reviewed  Tests in the radiology section of CPT®: reviewed  Tests in the medicine section of CPT®: reviewed    Risk of Complications, Morbidity, and/or Mortality  Presenting problems: high  Diagnostic procedures: high  Management options: high      CritCare Time    Disposition  Final diagnoses:   Back pain     Time reflects when diagnosis was documented in both MDM as applicable and the Disposition within this note     Time User Action Codes Description Comment    4/16/2018 10:31 AM Darwin Bolden Add [M54 9] Back pain       ED Disposition     ED Disposition Condition Comment    AMA  Date: 4/16/2018  Patient: Hans Yan  Admitted: 4/16/2018 10:03 AM  Attending Provider: Stephania Vasquez DO    Hans Conneraneta or her authorized caregiver has made the decision for the patient to leave the emergency department against the advice of h er attending physician  She or her authorized caregiver has been informed and understands the inherent risks, including death, spinal fracture   She or her authorized caregiver has decided to accept the responsibility for this decision  Vidal Gaston  and all necessary parties have been advised that she may return for further evaluation or treatment  Her condition at time of discharge was stable  Vidal Sanzs had current vital signs as follows:  /100 (BP Location: Right arm)   Pulse (!) 118    Temp 99 4 °F (37 4 °C) (Temporal)   Resp 20   Ht 5' 5" (1 651 m)   Wt 93 4 kg (206 lb)   LMP  (LMP Unknown)         Follow-up Information    None       Patient's Medications   Discharge Prescriptions    No medications on file     No discharge procedures on file      ED Provider  Electronically Signed by           Alexus Florence DO  04/16/18 3270

## 2018-04-16 NOTE — ED NOTES
Patient states she does not want CT due to bill of over 300 dollar for CT  Reported  To Dr Shelly Arreola, RN  04/16/18 3650

## 2018-08-12 ENCOUNTER — HOSPITAL ENCOUNTER (EMERGENCY)
Facility: HOSPITAL | Age: 43
Discharge: HOME | End: 2018-08-12
Payer: COMMERCIAL

## 2018-08-12 VITALS
OXYGEN SATURATION: 100 % | BODY MASS INDEX: 34.16 KG/M2 | HEART RATE: 102 BPM | HEIGHT: 65 IN | TEMPERATURE: 98.2 F | SYSTOLIC BLOOD PRESSURE: 138 MMHG | RESPIRATION RATE: 18 BRPM | WEIGHT: 205 LBS | DIASTOLIC BLOOD PRESSURE: 86 MMHG

## 2018-08-12 DIAGNOSIS — M54.50 LOW BACK PAIN: Primary | ICD-10-CM

## 2018-08-12 LAB
ALBUMIN SERPL BCP-MCNC: 4.3 G/DL (ref 3.5–5.7)
ALP SERPL-CCNC: 44 U/L (ref 40–150)
ALT SERPL W P-5'-P-CCNC: 32 U/L (ref 7–52)
ANION GAP SERPL CALCULATED.3IONS-SCNC: 9 MMOL/L (ref 4–13)
AST SERPL W P-5'-P-CCNC: 29 U/L (ref 13–39)
BASOPHILS # BLD AUTO: 0.1 THOUSANDS/ΜL (ref 0–0.1)
BASOPHILS NFR BLD AUTO: 1 % (ref 0–2)
BILIRUB SERPL-MCNC: 0.3 MG/DL (ref 0.2–1)
BILIRUB UR QL STRIP: NEGATIVE
BUN SERPL-MCNC: 9 MG/DL (ref 7–25)
CALCIUM SERPL-MCNC: 9.5 MG/DL (ref 8.6–10.5)
CHLORIDE SERPL-SCNC: 103 MMOL/L (ref 98–107)
CLARITY UR: CLEAR
CO2 SERPL-SCNC: 25 MMOL/L (ref 21–31)
COLOR UR: YELLOW
CREAT SERPL-MCNC: 0.78 MG/DL (ref 0.6–1.2)
EOSINOPHIL # BLD AUTO: 0.3 THOUSAND/ΜL (ref 0–0.61)
EOSINOPHIL NFR BLD AUTO: 4 % (ref 0–5)
ERYTHROCYTE [DISTWIDTH] IN BLOOD BY AUTOMATED COUNT: 12.6 % (ref 11.5–14.5)
GFR SERPL CREATININE-BSD FRML MDRD: 93 ML/MIN/1.73SQ M
GLUCOSE SERPL-MCNC: 96 MG/DL (ref 65–99)
GLUCOSE UR STRIP-MCNC: NEGATIVE MG/DL
HCT VFR BLD AUTO: 42.4 % (ref 34.8–46.1)
HGB BLD-MCNC: 14.9 G/DL (ref 12–16)
HGB UR QL STRIP.AUTO: NEGATIVE
KETONES UR STRIP-MCNC: NEGATIVE MG/DL
LEUKOCYTE ESTERASE UR QL STRIP: NEGATIVE
LYMPHOCYTES # BLD AUTO: 2.3 THOUSANDS/ΜL (ref 0.6–4.47)
LYMPHOCYTES NFR BLD AUTO: 25 % (ref 21–51)
MCH RBC QN AUTO: 33.1 PG (ref 26–34)
MCHC RBC AUTO-ENTMCNC: 35.2 G/DL (ref 31–37)
MCV RBC AUTO: 94 FL (ref 81–99)
MONOCYTES # BLD AUTO: 0.4 THOUSAND/ΜL (ref 0.17–1.22)
MONOCYTES NFR BLD AUTO: 5 % (ref 2–12)
NEUTROPHILS # BLD AUTO: 6.3 THOUSANDS/ΜL (ref 1.4–6.5)
NEUTS SEG NFR BLD AUTO: 66 % (ref 42–75)
NITRITE UR QL STRIP: NEGATIVE
NRBC BLD AUTO-RTO: 0 /100 WBCS
PH UR STRIP.AUTO: 8 [PH] (ref 5–8)
PLATELET # BLD AUTO: 271 THOUSANDS/UL (ref 149–390)
PMV BLD AUTO: 8.3 FL (ref 8.6–11.7)
POTASSIUM SERPL-SCNC: 4.2 MMOL/L (ref 3.5–5.5)
PROT SERPL-MCNC: 7.1 G/DL (ref 6.4–8.9)
PROT UR STRIP-MCNC: NEGATIVE MG/DL
RBC # BLD AUTO: 4.51 MILLION/UL (ref 3.9–5.2)
SODIUM SERPL-SCNC: 137 MMOL/L (ref 134–143)
SP GR UR STRIP.AUTO: 1.02 (ref 1–1.03)
UROBILINOGEN UR QL STRIP.AUTO: 0.2 E.U./DL
WBC # BLD AUTO: 9.5 THOUSAND/UL (ref 4.8–10.8)

## 2018-08-12 PROCEDURE — 85025 COMPLETE CBC W/AUTO DIFF WBC: CPT | Performed by: PHYSICIAN ASSISTANT

## 2018-08-12 PROCEDURE — 96374 THER/PROPH/DIAG INJ IV PUSH: CPT

## 2018-08-12 PROCEDURE — 99283 EMERGENCY DEPT VISIT LOW MDM: CPT

## 2018-08-12 PROCEDURE — 81003 URINALYSIS AUTO W/O SCOPE: CPT | Performed by: PHYSICIAN ASSISTANT

## 2018-08-12 PROCEDURE — 96375 TX/PRO/DX INJ NEW DRUG ADDON: CPT

## 2018-08-12 PROCEDURE — 36415 COLL VENOUS BLD VENIPUNCTURE: CPT | Performed by: PHYSICIAN ASSISTANT

## 2018-08-12 PROCEDURE — 80053 COMPREHEN METABOLIC PANEL: CPT | Performed by: PHYSICIAN ASSISTANT

## 2018-08-12 RX ORDER — METHOCARBAMOL 500 MG/1
500 TABLET, FILM COATED ORAL 3 TIMES DAILY
Qty: 20 TABLET | Refills: 0 | Status: SHIPPED | OUTPATIENT
Start: 2018-08-12 | End: 2018-09-10

## 2018-08-12 RX ORDER — DIAZEPAM 5 MG/ML
10 INJECTION, SOLUTION INTRAMUSCULAR; INTRAVENOUS ONCE
Status: COMPLETED | OUTPATIENT
Start: 2018-08-12 | End: 2018-08-12

## 2018-08-12 RX ORDER — FENTANYL CITRATE 50 UG/ML
50 INJECTION, SOLUTION INTRAMUSCULAR; INTRAVENOUS ONCE
Status: COMPLETED | OUTPATIENT
Start: 2018-08-12 | End: 2018-08-12

## 2018-08-12 RX ORDER — DIAZEPAM 5 MG/1
5 TABLET ORAL EVERY 12 HOURS PRN
Qty: 10 TABLET | Refills: 0 | Status: SHIPPED | OUTPATIENT
Start: 2018-08-12 | End: 2018-09-10

## 2018-08-12 RX ORDER — PREDNISONE 20 MG/1
20 TABLET ORAL DAILY
Qty: 18 TABLET | Refills: 0 | Status: SHIPPED | OUTPATIENT
Start: 2018-08-12 | End: 2018-09-10

## 2018-08-12 RX ORDER — METHYLPREDNISOLONE SODIUM SUCCINATE 125 MG/2ML
125 INJECTION, POWDER, LYOPHILIZED, FOR SOLUTION INTRAMUSCULAR; INTRAVENOUS ONCE
Status: COMPLETED | OUTPATIENT
Start: 2018-08-12 | End: 2018-08-12

## 2018-08-12 RX ADMIN — Medication 10 MG: at 18:10

## 2018-08-12 RX ADMIN — METHYLPREDNISOLONE SODIUM SUCCINATE 125 MG: 125 INJECTION, POWDER, FOR SOLUTION INTRAMUSCULAR; INTRAVENOUS at 18:10

## 2018-08-12 RX ADMIN — FENTANYL CITRATE 50 MCG: 50 INJECTION INTRAMUSCULAR; INTRAVENOUS at 18:58

## 2018-08-12 NOTE — ED NOTES
Valium scanned but not admin until solumedrol admin over 3 min       [address] RN  08/12/18 0900

## 2018-08-12 NOTE — DISCHARGE INSTRUCTIONS
Acute Low Back Pain, Ambulatory Care   GENERAL INFORMATION:   Acute low back pain  is discomfort in your lower back area that lasts for less than 12 weeks  The word acute is used to describe pain that starts suddenly, worsens quickly, and lasts for a short time  Common symptoms include the following:   · Back stiffness or spasms    · Pain down the back or side of one leg    · Holding yourself in an unusual position or posture to decrease your back pain    · Not being able to find a sitting position that is comfortable    · Slow increase in your pain for 24 to 48 hours after you stress your back    · Tenderness on your lower back or severe pain when you move your back  Seek immediate care for the following symptoms:   · Severe pain    · Sudden stiffness and heaviness in both buttocks down to both legs    · Numbness or weakness in one leg, or pain in both legs    · Numbness in your genital area or across your lower back    · Unable to control your urine or bowel movements  Treatment for acute low back pain  may include any of the following:  · Medicines:      ¨ NSAIDs  help decrease swelling and pain or fever  This medicine is available with or without a doctor's order  NSAIDs can cause stomach bleeding or kidney problems in certain people  If you take blood thinner medicine, always ask your healthcare provider if NSAIDs are safe for you  Always read the medicine label and follow directions  ¨ Muscle relaxers  help decrease muscle spasms pain  ¨ Prescription pain medicine  may be given  Ask how to take this medicine safely  · Surgery  may be needed if your pain is severe and other treatments do not work  Surgery may be needed for conditions of the lumbar spine, such as herniated disc or spinal stenosis  Manage your symptoms:   · Sleep on a firm mattress  If you do not have a firm mattress, have someone move your mattress to the floor for a few days   A piece of plywood under your mattress can also help make it firmer  · Apply ice  on your lower back for 15 to 20 minutes every hour or as directed  Use an ice pack, or put crushed ice in a plastic bag  Cover it with a towel  Ice helps prevent tissue damage and decreases swelling and pain  You can alternate ice and heat  · Apply heat  on your lower back for 20 to 30 minutes every 2 hours for as many days as directed  Heat helps decrease pain and muscle spasms  · Go to physical therapy  A physical therapist teaches you exercises to help improve movement and strength, and to decrease pain  Prevent acute low back pain:   · Use proper body mechanics  ¨ Bend at the hips and knees when you  objects  Do not bend from the waist  Use your leg muscles as you lift the load  Do not use your back  Keep the object close to your chest as you lift it  Try not to twist or lift anything above your waist     ¨ Change your position often when you stand for long periods of time  Rest one foot on a small box or footrest, and then switch to the other foot often  ¨ Try not to sit for long periods of time  When you do, sit in a straight-backed chair with your feet flat on the floor  Never reach, pull, or push while you are sitting  · Exercise regularly  Warm up before you exercise  Do exercises that strengthen your back muscles  Ask about the best exercise plan for you  · Maintain a healthy weight  Ask your healthcare provider how much you should weigh  Ask him to help you create a weight loss plan if you are overweight  Follow up with your healthcare provider as directed:  Return for a follow-up visit if you still have pain after 1 to 3 weeks of treatment  You may need to visit an orthopedist if your back pain lasts more than 6 to 12 weeks  Write down your questions so you remember to ask them during your visits  CARE AGREEMENT:   You have the right to help plan your care  Learn about your health condition and how it may be treated   Discuss treatment options with your caregivers to decide what care you want to receive  You always have the right to refuse treatment  The above information is an  only  It is not intended as medical advice for individual conditions or treatments  Talk to your doctor, nurse or pharmacist before following any medical regimen to see if it is safe and effective for you  © 2014 [address] is for End User's use only and may not be sold, redistributed or otherwise used for commercial purposes  All illustrations and images included in CareNotes® are the copyrighted property of [employer]  or Ben[employer] Morris

## 2018-08-12 NOTE — ED PROVIDER NOTES
History  Chief Complaint   Patient presents with    Back Pain     started friday night,denies injury or overstress  Patient is a 28-year-old female with a history of chronic left-sided low back pain and sciatica under the care of pain management who presents to the emergency department due to increasing pain to her left lower back without radiation into the left lower extremity  She denies any injury  Patient states that this pain feels different from the typical chronic pain that she usually has  She usually takes Vicodin 5/325 for pain control which usually seems to help but has not been helping with this particular pain over the last 24 hours  She otherwise denies fevers chills nausea vomiting diarrhea abdominal pain dysuria frequency hematuria numbness weakness tingling in or lower extremities incontinence of bowel or bladder urinary retention  Prior to Admission Medications   Prescriptions Last Dose Informant Patient Reported? Taking? HYDROcodone-acetaminophen (XODOL )  MG per tablet   Yes No   Sig: Take 1 tablet by mouth every 6 (six) hours as needed for moderate pain   dexlansoprazole (DEXILANT) 30 MG capsule   Yes No   Sig: Take 30 mg by mouth daily   ondansetron (ZOFRAN) 4 mg tablet   No No   Sig: Take 1 tablet (4 mg total) by mouth every 12 (twelve) hours as needed for nausea or vomiting for up to 2 days   traMADol (ULTRAM) 50 mg tablet   Yes No   Sig: Take 50 mg by mouth 2 (two) times a day      Facility-Administered Medications: None       Past Medical History:   Diagnosis Date    Hodgkin's lymphoma ([facility] [address] )     Migraine     Unspecified ovarian cysts        Past Surgical History:   Procedure Laterality Date    APPENDECTOMY      CHOLECYSTECTOMY      HYSTERECTOMY      LYMPH NODE BIOPSY      left neck    MEDIPORT INSERTION, SINGLE      MEDIPORT REMOVAL      TUBAL LIGATION         History reviewed  No pertinent family history    I have reviewed and agree with the history as documented  Social History   Substance Use Topics    Smoking status: Former Smoker     Packs/day: 0 50     Types: Cigarettes    Smokeless tobacco: Never Used      Comment: quit 2 month ago   Alcohol use No        Review of Systems   Constitutional: Negative for chills, diaphoresis, fatigue and fever  HENT: Negative for congestion, ear pain, rhinorrhea, sneezing and sore throat  Respiratory: Negative for cough, shortness of breath, wheezing and stridor  Cardiovascular: Negative for chest pain, palpitations and leg swelling  Gastrointestinal: Negative for abdominal distention, abdominal pain, blood in stool, constipation, diarrhea, nausea and vomiting  Genitourinary: Negative for difficulty urinating, dysuria, flank pain, frequency, hematuria and urgency  Musculoskeletal: Positive for back pain  Negative for gait problem, myalgias and neck pain  Skin: Negative for color change, pallor, rash and wound  Neurological: Negative for dizziness, syncope, weakness, light-headedness and headaches  All other systems reviewed and are negative  Physical Exam  Physical Exam   Constitutional: She is oriented to person, place, and time  She appears well-developed and well-nourished  No distress  HENT:   Head: Normocephalic and atraumatic  Right Ear: External ear normal    Left Ear: External ear normal    Nose: Nose normal    Mouth/Throat: Oropharynx is clear and moist    Eyes: Conjunctivae and EOM are normal  Pupils are equal, round, and reactive to light  Neck: Normal range of motion  Neck supple  No thyromegaly present  Cardiovascular: Normal rate, regular rhythm and normal heart sounds  Exam reveals no gallop and no friction rub  No murmur heard  Pulmonary/Chest: Effort normal and breath sounds normal  No stridor  Abdominal: Soft  Bowel sounds are normal  She exhibits no distension and no mass  There is no tenderness  There is no rebound and no guarding  No hernia  Musculoskeletal: Normal range of motion  She exhibits no edema, tenderness or deformity  Lymphadenopathy:     She has no cervical adenopathy  Neurological: She is alert and oriented to person, place, and time  She has normal strength  She displays normal reflexes  Reflex Scores:       Patellar reflexes are 2+ on the right side and 2+ on the left side  Achilles reflexes are 2+ on the right side and 2+ on the left side  No saddle anesthesia  Negative straight leg raise bilaterally  Strength is 5/5 bilaterally lower extremities  Skin: Skin is warm and dry  No rash noted  She is not diaphoretic  No erythema  No pallor  Psychiatric: She has a normal mood and affect  Her behavior is normal    Nursing note and vitals reviewed        Vital Signs  ED Triage Vitals [08/12/18 1713]   Temperature Pulse Respirations Blood Pressure SpO2   98 7 °F (37 1 °C) (!) 110 20 132/90 100 %      Temp Source Heart Rate Source Patient Position - Orthostatic VS BP Location FiO2 (%)   Tympanic Monitor Lying Left arm --      Pain Score       Worst Possible Pain           Vitals:    08/12/18 1713 08/12/18 1931   BP: 132/90 138/86   Pulse: (!) 110 102   Patient Position - Orthostatic VS: Lying Sitting       Visual Acuity  Visual Acuity      Most Recent Value   L Pupil Size (mm)  3   R Pupil Size (mm)  3          ED Medications  Medications   methylPREDNISolone sodium succinate (Solu-MEDROL) injection 125 mg (125 mg Intravenous Given 8/12/18 1810)   diazepam (VALIUM) injection 10 mg (10 mg Intravenous Given 8/12/18 1810)   fentanyl citrate (PF) 100 MCG/2ML 50 mcg (50 mcg Intravenous Given 8/12/18 1858)       Diagnostic Studies  Results Reviewed     Procedure Component Value Units Date/Time    UA w Reflex to Microscopic w Reflex to Culture [[id number]]  (Abnormal) Collected:  08/12/18 1851    Lab Status:  Final result Specimen:  Urine from Urine, Clean Catch Updated:  08/12/18 1905     Color, UA Yellow     Clarity, UA Clear Specific [place], UA 1 020     pH, UA 8 0 (H)     Leukocytes, UA Negative     Nitrite, UA Negative     Protein, UA Negative mg/dl      Glucose, UA Negative mg/dl      Ketones, UA Negative mg/dl      Urobilinogen, UA 0 2 E U /dl      Bilirubin, UA Negative     Blood, UA Negative    Comprehensive metabolic panel [[id number]] Collected:  08/12/18 1807    Lab Status:  Final result Specimen:  Blood from Arm, Right Updated:  08/12/18 1840     Sodium 137 mmol/L      Potassium 4 2 mmol/L      Chloride 103 mmol/L      CO2 25 mmol/L      Anion Gap 9 mmol/L      BUN 9 mg/dL      Creatinine 0 78 mg/dL      Glucose 96 mg/dL      Calcium 9 5 mg/dL      AST 29 U/L      ALT 32 U/L      Alkaline Phosphatase 44 U/L      Total Protein 7 1 g/dL      Albumin 4 3 g/dL      Total Bilirubin 0 30 mg/dL      eGFR 93 ml/min/1 73sq m     Narrative:         National Kidney Disease Education Program recommendations are as follows:  GFR calculation is accurate only with a steady state creatinine  Chronic Kidney disease less than 60 ml/min/1 73 sq  meters  Kidney failure less than 15 ml/min/1 73 sq  meters      CBC and differential [[id number]]  (Abnormal) Collected:  08/12/18 1807    Lab Status:  Final result Specimen:  Blood from Arm, Right Updated:  08/12/18 1821     WBC 9 50 Thousand/uL      RBC 4 51 Million/uL      Hemoglobin 14 9 g/dL      Hematocrit 42 4 %      MCV 94 fL      MCH 33 1 pg      MCHC 35 2 g/dL      RDW 12 6 %      MPV 8 3 (L) fL      Platelets 257 Thousands/uL      nRBC 0 /100 WBCs      Neutrophils Relative 66 %      Lymphocytes Relative 25 %      Monocytes Relative 5 %      Eosinophils Relative 4 %      Basophils Relative 1 %      Neutrophils Absolute 6 30 Thousands/µL      Lymphocytes Absolute 2 30 Thousands/µL      Monocytes Absolute 0 40 Thousand/µL      Eosinophils Absolute 0 30 Thousand/µL      Basophils Absolute 0 10 Thousands/µL                  No orders to display              Procedures  Procedures       Phone Contacts  ED Phone Contact    ED Course                               MDM  Number of Diagnoses or Management Options  Low back pain:   Diagnosis management comments: Patient is a 54-year-old female with a history of chronic low back pain on the left side under care pain management usually takes Vicodin 5/325 for pain control which general usually helps but is not at this time  Comes in with worsening pain over the last 24 hours atypical over usual pattern  She denies any red flag symptoms in regards to incontinence of bowel or bladder urinary retention numbness weakness or difficulty ambulating  She denies history of IV drug use or diabetes  Will screen with basic labs and medicate at this time there is no red flag symptoms and her history she denies any trauma that would or any sort of higher level imaging  She has a negative physical exam     1915:  Labs and imaging reassuring patient pain improving emergency department again no red flag symptoms patient is clinically and hemodynamically stable for discharge home and outpatient follow-up  Return precautions and anticipatory guidance was discussed patient verbalized understanding  She was given the opportunity to ask questions  R/X for robaxin was cancelled and pt was given a r/x for valium for muscle spasms  CritCare Time    Disposition  Final diagnoses:   Low back pain - Acute on chronic     Time reflects when diagnosis was documented in both MDM as applicable and the Disposition within this note     Time User Action Codes Description Comment    8/12/2018  7:15 PM Carl Ang Add [M54 5] Low back pain     8/12/2018  7:15 PM Carl Ang Modify [M54 5] Low back pain Acute on chronic      ED Disposition     ED Disposition Condition Comment    Discharge  Shirley Nicolas discharge to home/self care      Condition at discharge: Good        Follow-up Information     Follow up With Specialties Details Why Contact Brenna Mark DO  In 2 days  [address] [facility]  [address] [place] [place] [address]  [phone]            Patient's Medications   Discharge Prescriptions    DIAZEPAM (VALIUM) 5 MG TABLET    Take 1 tablet (5 mg total) by mouth every 12 (twelve) hours as needed for anxiety for up to 3 days       Start Date: 8/12/2018 End Date: 8/15/2018       Order Dose: 5 mg       Quantity: 10 tablet    Refills: 0    METHOCARBAMOL (ROBAXIN) 500 MG TABLET    Take 1 tablet (500 mg total) by mouth 3 (three) times a day       Start Date: 8/12/2018 End Date: --       Order Dose: 500 mg       Quantity: 20 tablet    Refills: 0    PREDNISONE 20 MG TABLET    Take 1 tablet (20 mg total) by mouth daily Take 3 tabs for 3 days, then 2 tabs for 3 days, and 1 tab for last 3 days  Start Date: 8/12/2018 End Date: --       Order Dose: 20 mg       Quantity: 18 tablet    Refills: 0     No discharge procedures on file      ED Provider  Electronically Signed by           Ladan Gifford PA-C  08/12/18 6828

## 2018-09-10 ENCOUNTER — HOSPITAL ENCOUNTER (EMERGENCY)
Facility: HOSPITAL | Age: 43
Discharge: HOME | End: 2018-09-10

## 2018-09-10 VITALS
HEIGHT: 65 IN | WEIGHT: 198 LBS | SYSTOLIC BLOOD PRESSURE: 158 MMHG | HEART RATE: 110 BPM | RESPIRATION RATE: 18 BRPM | DIASTOLIC BLOOD PRESSURE: 95 MMHG | BODY MASS INDEX: 32.99 KG/M2 | TEMPERATURE: 98.9 F | OXYGEN SATURATION: 100 %

## 2018-09-10 DIAGNOSIS — M25.531 BILATERAL WRIST PAIN: ICD-10-CM

## 2018-09-10 DIAGNOSIS — M54.9 BACK PAIN: ICD-10-CM
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
## 2018-09-10 DIAGNOSIS — M25.532 BILATERAL WRIST PAIN: ICD-10-CM

## 2018-09-10 PROCEDURE — 99284 EMERGENCY DEPT VISIT MOD MDM: CPT

## 2018-09-10 RX ORDER — HYDROCODONE BITARTRATE AND ACETAMINOPHEN 5; 325 MG/1; MG/1
2 TABLET ORAL ONCE
Status: COMPLETED | OUTPATIENT
Start: 2018-09-10 | End: 2018-09-10

## 2018-09-10 RX ORDER — HYDROCODONE BITARTRATE AND ACETAMINOPHEN 10; 325 MG/1; MG/1
1 TABLET ORAL EVERY 6 HOURS PRN
COMMUNITY
End: 2019-08-25

## 2018-09-10 RX ADMIN — HYDROCODONE BITARTRATE AND ACETAMINOPHEN 2 TABLET: 5; 325 TABLET ORAL at 18:18
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
## 2019-04-07 ENCOUNTER — APPOINTMENT (EMERGENCY)
Dept: RADIOLOGY | Facility: HOSPITAL | Age: 44
End: 2019-04-07
Payer: COMMERCIAL

## 2019-04-07 ENCOUNTER — HOSPITAL ENCOUNTER (EMERGENCY)
Facility: HOSPITAL | Age: 44
Discharge: HOME | End: 2019-04-08
Attending: EMERGENCY MEDICINE
Payer: COMMERCIAL

## 2019-04-07 DIAGNOSIS — J20.9 ACUTE BRONCHITIS: Primary | ICD-10-CM

## 2019-04-07 LAB
ALBUMIN SERPL BCP-MCNC: 4.3 G/DL (ref 3.5–5.7)
ALP SERPL-CCNC: 51 U/L (ref 40–150)
ALT SERPL W P-5'-P-CCNC: 33 U/L (ref 7–52)
ANION GAP SERPL CALCULATED.3IONS-SCNC: 9 MMOL/L (ref 4–13)
AST SERPL W P-5'-P-CCNC: 24 U/L (ref 13–39)
BASOPHILS # BLD AUTO: 0.1 THOUSANDS/ΜL (ref 0–0.1)
BASOPHILS NFR BLD AUTO: 1 % (ref 0–2)
BILIRUB SERPL-MCNC: 0.4 MG/DL (ref 0.2–1)
BUN SERPL-MCNC: 7 MG/DL (ref 7–25)
CALCIUM SERPL-MCNC: 9.4 MG/DL (ref 8.6–10.5)
CHLORIDE SERPL-SCNC: 100 MMOL/L (ref 98–107)
CO2 SERPL-SCNC: 24 MMOL/L (ref 21–31)
CREAT SERPL-MCNC: 0.8 MG/DL (ref 0.6–1.2)
EOSINOPHIL # BLD AUTO: 0.2 THOUSAND/ΜL (ref 0–0.61)
EOSINOPHIL NFR BLD AUTO: 2 % (ref 0–5)
ERYTHROCYTE [DISTWIDTH] IN BLOOD BY AUTOMATED COUNT: 13.3 % (ref 11.5–14.5)
GFR SERPL CREATININE-BSD FRML MDRD: 91 ML/MIN/1.73SQ M
GLUCOSE SERPL-MCNC: 103 MG/DL (ref 65–99)
HCT VFR BLD AUTO: 41.1 % (ref 42–47)
HGB BLD-MCNC: 14.3 G/DL (ref 12–16)
LYMPHOCYTES # BLD AUTO: 2 THOUSANDS/ΜL (ref 0.6–4.47)
LYMPHOCYTES NFR BLD AUTO: 19 % (ref 21–51)
MCH RBC QN AUTO: 33.2 PG (ref 26–34)
MCHC RBC AUTO-ENTMCNC: 34.9 G/DL (ref 31–37)
MCV RBC AUTO: 95 FL (ref 81–99)
MONOCYTES # BLD AUTO: 1 THOUSAND/ΜL (ref 0.17–1.22)
MONOCYTES NFR BLD AUTO: 10 % (ref 2–12)
NEUTROPHILS # BLD AUTO: 7.1 THOUSANDS/ΜL (ref 1.4–6.5)
NEUTS SEG NFR BLD AUTO: 68 % (ref 42–75)
PLATELET # BLD AUTO: 200 THOUSANDS/UL (ref 149–390)
PMV BLD AUTO: 9 FL (ref 8.6–11.7)
POTASSIUM SERPL-SCNC: 4 MMOL/L (ref 3.5–5.5)
PROT SERPL-MCNC: 7.3 G/DL (ref 6.4–8.9)
RBC # BLD AUTO: 4.31 MILLION/UL (ref 3.9–5.2)
SODIUM SERPL-SCNC: 133 MMOL/L (ref 134–143)
WBC # BLD AUTO: 10.4 THOUSAND/UL (ref 4.8–10.8)

## 2019-04-07 PROCEDURE — 99284 EMERGENCY DEPT VISIT MOD MDM: CPT

## 2019-04-07 PROCEDURE — 96374 THER/PROPH/DIAG INJ IV PUSH: CPT

## 2019-04-07 PROCEDURE — 80053 COMPREHEN METABOLIC PANEL: CPT | Performed by: EMERGENCY MEDICINE

## 2019-04-07 PROCEDURE — 94664 DEMO&/EVAL PT USE INHALER: CPT

## 2019-04-07 PROCEDURE — 85025 COMPLETE CBC W/AUTO DIFF WBC: CPT | Performed by: EMERGENCY MEDICINE

## 2019-04-07 PROCEDURE — 36415 COLL VENOUS BLD VENIPUNCTURE: CPT | Performed by: EMERGENCY MEDICINE

## 2019-04-07 PROCEDURE — 71046 X-RAY EXAM CHEST 2 VIEWS: CPT

## 2019-04-07 RX ORDER — ALBUTEROL SULFATE 2.5 MG/3ML
2.5 SOLUTION RESPIRATORY (INHALATION) ONCE
Status: COMPLETED | OUTPATIENT
Start: 2019-04-07 | End: 2019-04-07

## 2019-04-07 RX ORDER — METHYLPREDNISOLONE SODIUM SUCCINATE 125 MG/2ML
125 INJECTION, POWDER, LYOPHILIZED, FOR SOLUTION INTRAMUSCULAR; INTRAVENOUS ONCE
Status: COMPLETED | OUTPATIENT
Start: 2019-04-07 | End: 2019-04-07

## 2019-04-07 RX ADMIN — METHYLPREDNISOLONE SODIUM SUCCINATE 125 MG: 125 INJECTION, POWDER, FOR SOLUTION INTRAMUSCULAR; INTRAVENOUS at 22:20

## 2019-04-07 RX ADMIN — ALBUTEROL SULFATE 2.5 MG: 2.5 SOLUTION RESPIRATORY (INHALATION) at 22:23

## 2019-04-08 VITALS
HEIGHT: 65 IN | OXYGEN SATURATION: 98 % | BODY MASS INDEX: 31.99 KG/M2 | SYSTOLIC BLOOD PRESSURE: 126 MMHG | WEIGHT: 192 LBS | DIASTOLIC BLOOD PRESSURE: 70 MMHG | RESPIRATION RATE: 22 BRPM | TEMPERATURE: 99.4 F | HEART RATE: 114 BPM

## 2019-04-08 RX ORDER — HYDROCODONE BITARTRATE AND ACETAMINOPHEN 5; 325 MG/1; MG/1
1 TABLET ORAL EVERY 4 HOURS PRN
Qty: 30 TABLET | Refills: 0 | Status: SHIPPED | OUTPATIENT
Start: 2019-04-08 | End: 2019-04-18

## 2019-04-08 RX ORDER — DOXYCYCLINE HYCLATE 100 MG/1
100 CAPSULE ORAL 2 TIMES DAILY
Qty: 10 CAPSULE | Refills: 0 | Status: SHIPPED | OUTPATIENT
Start: 2019-04-08 | End: 2019-04-13

## 2019-04-08 RX ORDER — HYDROCODONE BITARTRATE AND ACETAMINOPHEN 5; 325 MG/1; MG/1
1 TABLET ORAL ONCE
Status: COMPLETED | OUTPATIENT
Start: 2019-04-08 | End: 2019-04-08

## 2019-04-08 RX ORDER — DOXYCYCLINE HYCLATE 50 MG/1
100 CAPSULE ORAL ONCE
Status: COMPLETED | OUTPATIENT
Start: 2019-04-08 | End: 2019-04-08

## 2019-04-08 RX ORDER — BROMPHENIRAMINE MALEATE, PSEUDOEPHEDRINE HYDROCHLORIDE, AND DEXTROMETHORPHAN HYDROBROMIDE 2; 30; 10 MG/5ML; MG/5ML; MG/5ML
5 SYRUP ORAL 4 TIMES DAILY PRN
Qty: 120 ML | Refills: 0 | Status: SHIPPED | OUTPATIENT
Start: 2019-04-08 | End: 2019-08-25

## 2019-04-08 RX ADMIN — DOXYCYCLINE HYCLATE 100 MG: 50 CAPSULE ORAL at 00:42

## 2019-04-08 RX ADMIN — HYDROCODONE BITARTRATE AND ACETAMINOPHEN 1 TABLET: 5; 325 TABLET ORAL at 00:42

## 2019-05-29 ENCOUNTER — HOSPITAL ENCOUNTER (EMERGENCY)
Facility: HOSPITAL | Age: 44
Discharge: HOME | End: 2019-05-29
Attending: EMERGENCY MEDICINE
Payer: COMMERCIAL

## 2019-05-29 VITALS
OXYGEN SATURATION: 99 % | HEART RATE: 118 BPM | DIASTOLIC BLOOD PRESSURE: 90 MMHG | TEMPERATURE: 99.7 F | RESPIRATION RATE: 18 BRPM | HEIGHT: 65 IN | SYSTOLIC BLOOD PRESSURE: 156 MMHG | WEIGHT: 191.8 LBS | BODY MASS INDEX: 31.96 KG/M2

## 2019-05-29 DIAGNOSIS — R21 RASH: Primary | ICD-10-CM

## 2019-05-29 DIAGNOSIS — B86 SCABIES: ICD-10-CM

## 2019-05-29 DIAGNOSIS — K08.89 DENTALGIA: ICD-10-CM

## 2019-05-29 PROCEDURE — 99282 EMERGENCY DEPT VISIT SF MDM: CPT

## 2019-05-29 RX ORDER — CLINDAMYCIN HYDROCHLORIDE 150 MG/1
150 CAPSULE ORAL EVERY 6 HOURS
Qty: 28 CAPSULE | Refills: 0 | Status: SHIPPED | OUTPATIENT
Start: 2019-05-29 | End: 2019-06-05

## 2019-05-29 RX ORDER — PERMETHRIN 50 MG/G
CREAM TOPICAL
Qty: 60 G | Refills: 0 | Status: SHIPPED | OUTPATIENT
Start: 2019-05-29 | End: 2019-08-25

## 2019-06-28 ENCOUNTER — HOSPITAL ENCOUNTER (EMERGENCY)
Facility: HOSPITAL | Age: 44
Discharge: HOME | End: 2019-06-28
Attending: EMERGENCY MEDICINE
Payer: COMMERCIAL

## 2019-06-28 VITALS
HEART RATE: 100 BPM | DIASTOLIC BLOOD PRESSURE: 91 MMHG | BODY MASS INDEX: 28.99 KG/M2 | HEIGHT: 65 IN | RESPIRATION RATE: 18 BRPM | SYSTOLIC BLOOD PRESSURE: 134 MMHG | TEMPERATURE: 97.8 F | OXYGEN SATURATION: 99 % | WEIGHT: 174 LBS

## 2019-06-28 DIAGNOSIS — K02.9 DENTAL CARIES: Primary | ICD-10-CM

## 2019-06-28 PROCEDURE — 99282 EMERGENCY DEPT VISIT SF MDM: CPT

## 2019-06-28 RX ORDER — NABUMETONE 500 MG/1
500 TABLET, FILM COATED ORAL 2 TIMES DAILY
Qty: 12 TABLET | Refills: 0 | Status: SHIPPED | OUTPATIENT
Start: 2019-06-28 | End: 2019-06-28 | Stop reason: SDUPTHER

## 2019-06-28 RX ORDER — NABUMETONE 500 MG/1
500 TABLET, FILM COATED ORAL 2 TIMES DAILY
Qty: 12 TABLET | Refills: 0 | Status: SHIPPED | OUTPATIENT
Start: 2019-06-28 | End: 2019-08-25

## 2019-06-28 NOTE — ED PROVIDER NOTES
History  Chief Complaint   Patient presents with    Dental Pain     oral swelling and pain     Patient presents with several day history of worsening pain over left lower and upper molars  Pain is moderate to severe, tramadol earlier today helped  Patient admits to some facial swelling earlier today that has resolved  Patient has a dental appointment scheduled  History provided by:  Patient  Dental Pain   Location:  Upper and lower  Upper teeth location:  16/MARYSOL 3rd molar and 14/MARYSOL 1st molar  Lower teeth location:  19/LL 1st molar  Quality:  Aching  Severity:  Moderate  Onset quality:  Gradual  Timing:  Intermittent  Progression:  Worsening  Chronicity:  Recurrent  Context: dental caries    Relieved by: Tramadol  Ineffective treatments: Ibuprofen  Associated symptoms: facial swelling    Associated symptoms: no congestion, no difficulty swallowing, no drooling, no facial pain, no fever, no headaches, no neck swelling, no oral bleeding and no oral lesions        Prior to Admission Medications   Prescriptions Last Dose Informant Patient Reported? Taking?    HYDROcodone-acetaminophen (NORCO)  mg per tablet   Yes No   Sig: Take 1 tablet by mouth every 6 (six) hours as needed for moderate pain   brompheniramine-pseudoephedrine-DM 30-2-10 MG/5ML syrup   No No   Sig: Take 5 mL by mouth 4 (four) times a day as needed for congestion or cough   dexlansoprazole (DEXILANT) 30 MG capsule   Yes No   Sig: Take 30 mg by mouth daily   permethrin (ELIMITE) 5 % cream   No No   Sig: Apply to affected area once      Facility-Administered Medications: None       Past Medical History:   Diagnosis Date    Back pain     [facility] [address]     Migraine     Unspecified ovarian cysts        Past Surgical History:   Procedure Laterality Date    APPENDECTOMY      CHOLECYSTECTOMY      HYSTERECTOMY      LYMPH NODE BIOPSY      left neck    MEDIPORT INSERTION, SINGLE      MEDIPORT REMOVAL      TUBAL LIGATION History reviewed  No pertinent family history  I have reviewed and agree with the history as documented  Social History     Tobacco Use    Smoking status: Former Smoker     Packs/day: 0 50     Types: Cigarettes     Last attempt to quit: 3/24/2019     Years since quittin 2    Smokeless tobacco: Never Used   Substance Use Topics    Alcohol use: No    Drug use: No        Review of Systems   Constitutional: Negative for chills and fever  HENT: Positive for facial swelling  Negative for congestion, drooling, mouth sores and sore throat  Eyes: Negative for visual disturbance  Respiratory: Negative for cough and shortness of breath  Cardiovascular: Negative for chest pain  Gastrointestinal: Negative for abdominal pain, diarrhea, nausea and vomiting  Genitourinary: Negative for difficulty urinating  Musculoskeletal: Negative for back pain  Neurological: Negative for headaches  Physical Exam  Physical Exam   Constitutional: She is oriented to person, place, and time  She appears well-developed and well-nourished  No distress  HENT:   Head: Normocephalic and atraumatic  Right Ear: External ear normal    Left Ear: External ear normal    Nose: Nose normal    Extensive dental decay, some induration and tenderness of gingiva around left upper and left lower molars; no facial or mandibular swelling noted   Eyes: Pupils are equal, round, and reactive to light  Conjunctivae and EOM are normal    Neck: Normal range of motion  Neck supple  Cardiovascular: Normal rate, regular rhythm and normal heart sounds  Pulmonary/Chest: Effort normal and breath sounds normal    Neurological: She is alert and oriented to person, place, and time  Skin: Skin is warm and dry  Capillary refill takes less than 2 seconds  Psychiatric: She has a normal mood and affect  Her behavior is normal    Nursing note and vitals reviewed        Vital Signs  ED Triage Vitals [19 1652]   Temperature Pulse Respirations Blood Pressure SpO2   97 8 °F (36 6 °C) (!) 121 18 134/91 99 %      Temp Source Heart Rate Source Patient Position - Orthostatic VS BP Location FiO2 (%)   Temporal Monitor Sitting Left arm --      Pain Score       7           Vitals:    06/28/19 1652   BP: 134/91   Pulse: (!) 121   Patient Position - Orthostatic VS: Sitting         Visual Acuity      ED Medications  Medications - No data to display    Diagnostic Studies  Results Reviewed     None                 No orders to display              Procedures  Procedures       ED Course                               MDM    Disposition  Final diagnoses:   Dental caries     Time reflects when diagnosis was documented in both MDM as applicable and the Disposition within this note     Time User Action Codes Description Comment    6/28/2019  5:17 PM Isabel Krause Add [K02 9] Dental caries       ED Disposition     ED Disposition Condition Date/Time Comment    Discharge Stable Fri Jun 28, 2019  5:17 PM Dez Valentin discharge to home/self care  Follow-up Information    None         Patient's Medications   Discharge Prescriptions    NABUMETONE (RELAFEN) 500 MG TABLET    Take 1 tablet (500 mg total) by mouth 2 (two) times a day       Start Date: 6/28/2019 End Date: --       Order Dose: 500 mg       Quantity: 12 tablet    Refills: 0     No discharge procedures on file      ED Provider  Electronically Signed by           Levert Alpers, PA-C  06/28/19 7580

## 2019-08-25 ENCOUNTER — HOSPITAL ENCOUNTER (EMERGENCY)
Facility: HOSPITAL | Age: 44
Discharge: HOME | End: 2019-08-25
Attending: EMERGENCY MEDICINE | Admitting: EMERGENCY MEDICINE
Payer: COMMERCIAL

## 2019-08-25 ENCOUNTER — APPOINTMENT (EMERGENCY)
Dept: CT IMAGING | Facility: HOSPITAL | Age: 44
End: 2019-08-25
Payer: COMMERCIAL

## 2019-08-25 VITALS
WEIGHT: 183 LBS | BODY MASS INDEX: 30.49 KG/M2 | HEIGHT: 65 IN | TEMPERATURE: 96.6 F | DIASTOLIC BLOOD PRESSURE: 96 MMHG | OXYGEN SATURATION: 100 % | HEART RATE: 88 BPM | SYSTOLIC BLOOD PRESSURE: 143 MMHG | RESPIRATION RATE: 18 BRPM

## 2019-08-25 DIAGNOSIS — R10.12 LEFT UPPER QUADRANT PAIN: Primary | ICD-10-CM

## 2019-08-25 LAB
ALBUMIN SERPL BCP-MCNC: 4.4 G/DL (ref 3.5–5.7)
ALP SERPL-CCNC: 54 U/L (ref 40–150)
ALT SERPL W P-5'-P-CCNC: 41 U/L (ref 7–52)
ANION GAP SERPL CALCULATED.3IONS-SCNC: 11 MMOL/L (ref 4–13)
AST SERPL W P-5'-P-CCNC: 20 U/L (ref 13–39)
BASOPHILS # BLD AUTO: 0.1 THOUSANDS/ΜL (ref 0–0.1)
BASOPHILS NFR BLD AUTO: 1 % (ref 0–2)
BILIRUB SERPL-MCNC: 0.4 MG/DL (ref 0.2–1)
BILIRUB UR QL STRIP: NEGATIVE
BUN SERPL-MCNC: 8 MG/DL (ref 7–25)
CALCIUM SERPL-MCNC: 9.9 MG/DL (ref 8.6–10.5)
CHLORIDE SERPL-SCNC: 101 MMOL/L (ref 98–107)
CLARITY UR: CLEAR
CO2 SERPL-SCNC: 26 MMOL/L (ref 21–31)
COLOR UR: YELLOW
CREAT SERPL-MCNC: 1.21 MG/DL (ref 0.6–1.2)
EOSINOPHIL # BLD AUTO: 0.6 THOUSAND/ΜL (ref 0–0.61)
EOSINOPHIL NFR BLD AUTO: 6 % (ref 0–5)
ERYTHROCYTE [DISTWIDTH] IN BLOOD BY AUTOMATED COUNT: 12.8 % (ref 11.5–14.5)
GFR SERPL CREATININE-BSD FRML MDRD: 55 ML/MIN/1.73SQ M
GLUCOSE SERPL-MCNC: 107 MG/DL (ref 65–99)
GLUCOSE UR STRIP-MCNC: NEGATIVE MG/DL
HCT VFR BLD AUTO: 43.2 % (ref 42–47)
HGB BLD-MCNC: 14.7 G/DL (ref 12–16)
HGB UR QL STRIP.AUTO: NEGATIVE
KETONES UR STRIP-MCNC: NEGATIVE MG/DL
LEUKOCYTE ESTERASE UR QL STRIP: NEGATIVE
LYMPHOCYTES # BLD AUTO: 2.9 THOUSANDS/ΜL (ref 0.6–4.47)
LYMPHOCYTES NFR BLD AUTO: 31 % (ref 21–51)
MCH RBC QN AUTO: 32.9 PG (ref 26–34)
MCHC RBC AUTO-ENTMCNC: 34 G/DL (ref 31–37)
MCV RBC AUTO: 97 FL (ref 81–99)
MONOCYTES # BLD AUTO: 0.5 THOUSAND/ΜL (ref 0.17–1.22)
MONOCYTES NFR BLD AUTO: 5 % (ref 2–12)
NEUTROPHILS # BLD AUTO: 5.3 THOUSANDS/ΜL (ref 1.4–6.5)
NEUTS SEG NFR BLD AUTO: 57 % (ref 42–75)
NITRITE UR QL STRIP: NEGATIVE
PH UR STRIP.AUTO: 7 [PH]
PLATELET # BLD AUTO: 272 THOUSANDS/UL (ref 149–390)
PMV BLD AUTO: 8.8 FL (ref 8.6–11.7)
POTASSIUM SERPL-SCNC: 3.9 MMOL/L (ref 3.5–5.5)
PROT SERPL-MCNC: 7.6 G/DL (ref 6.4–8.9)
PROT UR STRIP-MCNC: NEGATIVE MG/DL
RBC # BLD AUTO: 4.46 MILLION/UL (ref 3.9–5.2)
SODIUM SERPL-SCNC: 138 MMOL/L (ref 134–143)
SP GR UR STRIP.AUTO: 1.01 (ref 1–1.03)
UROBILINOGEN UR QL STRIP.AUTO: 0.2 E.U./DL
WBC # BLD AUTO: 9.3 THOUSAND/UL (ref 4.8–10.8)

## 2019-08-25 PROCEDURE — 96374 THER/PROPH/DIAG INJ IV PUSH: CPT

## 2019-08-25 PROCEDURE — 36415 COLL VENOUS BLD VENIPUNCTURE: CPT | Performed by: EMERGENCY MEDICINE

## 2019-08-25 PROCEDURE — 80053 COMPREHEN METABOLIC PANEL: CPT | Performed by: EMERGENCY MEDICINE

## 2019-08-25 PROCEDURE — 85025 COMPLETE CBC W/AUTO DIFF WBC: CPT | Performed by: EMERGENCY MEDICINE

## 2019-08-25 PROCEDURE — 99284 EMERGENCY DEPT VISIT MOD MDM: CPT

## 2019-08-25 PROCEDURE — 81003 URINALYSIS AUTO W/O SCOPE: CPT | Performed by: EMERGENCY MEDICINE

## 2019-08-25 PROCEDURE — 96361 HYDRATE IV INFUSION ADD-ON: CPT

## 2019-08-25 PROCEDURE — 74176 CT ABD & PELVIS W/O CONTRAST: CPT

## 2019-08-25 RX ORDER — HYDROCODONE BITARTRATE AND ACETAMINOPHEN 5; 325 MG/1; MG/1
1 TABLET ORAL ONCE
Status: COMPLETED | OUTPATIENT
Start: 2019-08-25 | End: 2019-08-25

## 2019-08-25 RX ORDER — HYDROCODONE BITARTRATE AND ACETAMINOPHEN 5; 325 MG/1; MG/1
1 TABLET ORAL EVERY 6 HOURS PRN
Qty: 12 TABLET | Refills: 0 | Status: SHIPPED | OUTPATIENT
Start: 2019-08-25 | End: 2019-08-28

## 2019-08-25 RX ORDER — ONDANSETRON 2 MG/ML
4 INJECTION INTRAMUSCULAR; INTRAVENOUS ONCE
Status: COMPLETED | OUTPATIENT
Start: 2019-08-25 | End: 2019-08-25

## 2019-08-25 RX ORDER — DICYCLOMINE HCL 20 MG
20 TABLET ORAL 2 TIMES DAILY
Qty: 20 TABLET | Refills: 0 | Status: SHIPPED | OUTPATIENT
Start: 2019-08-25 | End: 2019-12-27

## 2019-08-25 RX ADMIN — ONDANSETRON 4 MG: 2 INJECTION INTRAMUSCULAR; INTRAVENOUS at 21:48

## 2019-08-25 RX ADMIN — SODIUM CHLORIDE 1000 ML: 0.9 INJECTION, SOLUTION INTRAVENOUS at 21:44

## 2019-08-25 RX ADMIN — HYDROCODONE BITARTRATE AND ACETAMINOPHEN 1 TABLET: 5; 325 TABLET ORAL at 23:17

## 2019-08-29 NOTE — ED PROVIDER NOTES
History  Chief Complaint   Patient presents with    Abdominal Pain     left flank pain  began last night     Patient 75-year-old female with complaint of left flank pain  Patient reports pain is on her left side  She denies any fever chills  Denies any nausea or vomiting          Prior to Admission Medications   Prescriptions Last Dose Informant Patient Reported? Taking?   dexlansoprazole (DEXILANT) 30 MG capsule   Yes No   Sig: Take 30 mg by mouth daily      Facility-Administered Medications: None       Past Medical History:   Diagnosis Date    Back pain     Hodgkin's lymphoma ([address] )     Migraine     Unspecified ovarian cysts        Past Surgical History:   Procedure Laterality Date    APPENDECTOMY      CHOLECYSTECTOMY      HYSTERECTOMY      LYMPH NODE BIOPSY      left neck    MEDIPORT INSERTION, SINGLE      MEDIPORT REMOVAL      TUBAL LIGATION         History reviewed  No pertinent family history  I have reviewed and agree with the history as documented  Social History     Tobacco Use    Smoking status: Former Smoker     Packs/day: 0 50     Types: Cigarettes     Last attempt to quit: 3/24/2019     Years since quittin 4    Smokeless tobacco: Never Used   Substance Use Topics    Alcohol use: No    Drug use: No        Review of Systems   Constitutional: Negative  Respiratory: Negative  Cardiovascular: Negative  Gastrointestinal: Positive for abdominal pain and vomiting  Genitourinary: Negative  Musculoskeletal: Negative  Skin: Negative  Neurological: Negative  All other systems reviewed and are negative  Physical Exam  Physical Exam   Constitutional: She is oriented to person, place, and time  She appears well-developed and well-nourished  HENT:   Head: Normocephalic and atraumatic  Eyes: EOM are normal  No scleral icterus  Cardiovascular: Normal rate and regular rhythm  Pulmonary/Chest: Effort normal and breath sounds normal    Abdominal: Soft   There is no tenderness  Neurological: She is alert and oriented to person, place, and time  Skin: Skin is warm and dry  Psychiatric: She has a normal mood and affect  Her behavior is normal    Nursing note and vitals reviewed        Vital Signs  ED Triage Vitals [08/25/19 2124]   Temperature Pulse Respirations Blood Pressure SpO2   (!) 96 6 °F (35 9 °C) (!) 121 20 154/97 100 %      Temp Source Heart Rate Source Patient Position - Orthostatic VS BP Location FiO2 (%)   Temporal Monitor Sitting Left arm --      Pain Score       7           Vitals:    08/25/19 2124 08/25/19 2347   BP: 154/97 143/96   Pulse: (!) 121 88   Patient Position - Orthostatic VS: Sitting          Visual Acuity      ED Medications  Medications   ondansetron (ZOFRAN) injection 4 mg (4 mg Intravenous Given 8/25/19 2148)   sodium chloride 0 9 % bolus 1,000 mL (0 mL Intravenous Stopped 8/25/19 2347)   HYDROcodone-acetaminophen (NORCO) 5-325 mg per tablet 1 tablet (1 tablet Oral Given 8/25/19 2317)       Diagnostic Studies  Results Reviewed     Procedure Component Value Units Date/Time    UA w Reflex to Microscopic w Reflex to Culture [[id number]]  (Normal) Collected:  08/25/19 2318    Lab Status:  Final result Specimen:  Urine, Clean Catch Updated:  08/25/19 2325     Color, UA Yellow     Clarity, UA Clear     Specific Gravity, UA 1 010     pH, UA 7 0     Leukocytes, UA Negative     Nitrite, UA Negative     Protein, UA Negative mg/dl      Glucose, UA Negative mg/dl      Ketones, UA Negative mg/dl      Urobilinogen, UA 0 2 E U /dl      Bilirubin, UA Negative     Blood, UA Negative    Comprehensive metabolic panel [[id number]]  (Abnormal) Collected:  08/25/19 2148    Lab Status:  Final result Specimen:  Blood from Arm, Left Updated:  08/25/19 2208     Sodium 138 mmol/L      Potassium 3 9 mmol/L      Chloride 101 mmol/L      CO2 26 mmol/L      ANION GAP 11 mmol/L      BUN 8 mg/dL      Creatinine 1 21 mg/dL      Glucose 107 mg/dL      Calcium 9 9 mg/dL      AST 20 U/L      ALT 41 U/L      Alkaline Phosphatase 54 U/L      Total Protein 7 6 g/dL      Albumin 4 4 g/dL      Total Bilirubin 0 40 mg/dL      eGFR 55 ml/min/1 73sq m     Narrative:       [place] guidelines for Chronic Kidney Disease (CKD):     Stage 1 with normal or high GFR (GFR > 90 mL/min/1 73 square meters)    Stage 2 Mild CKD (GFR = 60-89 mL/min/1 73 square meters)    Stage 3A Moderate CKD (GFR = 45-59 mL/min/1 73 square meters)    Stage 3B Moderate CKD (GFR = 30-44 mL/min/1 73 square meters)    Stage 4 Severe CKD (GFR = 15-29 mL/min/1 73 square meters)    Stage 5 End Stage CKD (GFR <15 mL/min/1 73 square meters)  Note: GFR calculation is accurate only with a steady state creatinine    CBC and differential [[id number]]  (Abnormal) Collected:  08/25/19 2148    Lab Status:  Final result Specimen:  Blood from Arm, Left Updated:  08/25/19 2155     WBC 9 30 Thousand/uL      RBC 4 46 Million/uL      Hemoglobin 14 7 g/dL      Hematocrit 43 2 %      MCV 97 fL      MCH 32 9 pg      MCHC 34 0 g/dL      RDW 12 8 %      MPV 8 8 fL      Platelets 923 Thousands/uL      Neutrophils Relative 57 %      Lymphocytes Relative 31 %      Monocytes Relative 5 %      Eosinophils Relative 6 %      Basophils Relative 1 %      Neutrophils Absolute 5 30 Thousands/µL      Lymphocytes Absolute 2 90 Thousands/µL      Monocytes Absolute 0 50 Thousand/µL      Eosinophils Absolute 0 60 Thousand/µL      Basophils Absolute 0 10 Thousands/µL                  CT renal stone study abdomen pelvis wo contrast   Final Result by Yudelka Smith MD (08/25 2219)      No significant renal, ureteral, or bladder calculus  No hydronephrosis  Workstation performed: [id number]                    Procedures  Procedures       ED Course                               MDM  Number of Diagnoses or Management Options  Left upper quadrant pain:   Diagnosis management comments: Pain improved with pain medication    CT negative       Amount and/or Complexity of Data Reviewed  Clinical lab tests: reviewed  Tests in the radiology section of CPT®: reviewed    Patient Progress  Patient progress: stable      Disposition  Final diagnoses:   Left upper quadrant pain     Time reflects when diagnosis was documented in both MDM as applicable and the Disposition within this note     Time User Action Codes Description Comment    8/25/2019 11:38 PM Michael Coffman Add [R10 12] Left upper quadrant pain       ED Disposition     ED Disposition Condition Date/Time Comment    Discharge Stable Sun Aug 25, 2019 11:38 PM Wiley Fordrhina Baker discharge to home/self care  Follow-up Information     Follow up With Specialties Details Why Contact Info    Your doctor  In 2 days If symptoms worsen           Discharge Medication List as of 8/25/2019 11:40 PM      START taking these medications    Details   dicyclomine (BENTYL) 20 mg tablet Take 1 tablet (20 mg total) by mouth 2 (two) times a day, Starting Sun 8/25/2019, Print      HYDROcodone-acetaminophen (NORCO) 5-325 mg per tablet Take 1 tablet by mouth every 6 (six) hours as needed for pain for up to 3 daysMax Daily Amount: 4 tablets, Starting Sun 8/25/2019, Until Wed 8/28/2019, Print         CONTINUE these medications which have NOT CHANGED    Details   dexlansoprazole (DEXILANT) 30 MG capsule Take 30 mg by mouth daily, Historical Med           No discharge procedures on file      ED Provider  Electronically Signed by           Jose Larson MD  08/29/19 9652

## 2019-10-26 ENCOUNTER — HOSPITAL ENCOUNTER (EMERGENCY)
Facility: HOSPITAL | Age: 44
Discharge: HOME | End: 2019-10-26
Attending: EMERGENCY MEDICINE | Admitting: EMERGENCY MEDICINE
Payer: COMMERCIAL

## 2019-10-26 ENCOUNTER — APPOINTMENT (EMERGENCY)
Dept: RADIOLOGY | Facility: HOSPITAL | Age: 44
End: 2019-10-26
Payer: COMMERCIAL

## 2019-10-26 VITALS
HEART RATE: 79 BPM | BODY MASS INDEX: 30.16 KG/M2 | OXYGEN SATURATION: 95 % | RESPIRATION RATE: 18 BRPM | SYSTOLIC BLOOD PRESSURE: 118 MMHG | WEIGHT: 181 LBS | TEMPERATURE: 98.9 F | DIASTOLIC BLOOD PRESSURE: 55 MMHG | HEIGHT: 65 IN

## 2019-10-26 DIAGNOSIS — J40 BRONCHITIS: Primary | ICD-10-CM

## 2019-10-26 LAB
ANION GAP SERPL CALCULATED.3IONS-SCNC: 7 MMOL/L (ref 4–13)
BASOPHILS # BLD AUTO: 0.1 THOUSANDS/ΜL (ref 0–0.1)
BASOPHILS NFR BLD AUTO: 1 % (ref 0–2)
BUN SERPL-MCNC: 7 MG/DL (ref 7–25)
CALCIUM SERPL-MCNC: 9.8 MG/DL (ref 8.6–10.5)
CHLORIDE SERPL-SCNC: 105 MMOL/L (ref 98–107)
CO2 SERPL-SCNC: 26 MMOL/L (ref 21–31)
CREAT SERPL-MCNC: 0.76 MG/DL (ref 0.6–1.2)
D DIMER PPP FEU-MCNC: <0.15 UG/ML FEU
EOSINOPHIL # BLD AUTO: 0.4 THOUSAND/ΜL (ref 0–0.61)
EOSINOPHIL NFR BLD AUTO: 5 % (ref 0–5)
ERYTHROCYTE [DISTWIDTH] IN BLOOD BY AUTOMATED COUNT: 13.1 % (ref 11.5–14.5)
GFR SERPL CREATININE-BSD FRML MDRD: 96 ML/MIN/1.73SQ M
GLUCOSE SERPL-MCNC: 100 MG/DL (ref 65–99)
HCT VFR BLD AUTO: 41.8 % (ref 42–47)
HGB BLD-MCNC: 14.5 G/DL (ref 12–16)
LYMPHOCYTES # BLD AUTO: 2.8 THOUSANDS/ΜL (ref 0.6–4.47)
LYMPHOCYTES NFR BLD AUTO: 30 % (ref 21–51)
MCH RBC QN AUTO: 33.2 PG (ref 26–34)
MCHC RBC AUTO-ENTMCNC: 34.7 G/DL (ref 31–37)
MCV RBC AUTO: 96 FL (ref 81–99)
MONOCYTES # BLD AUTO: 0.5 THOUSAND/ΜL (ref 0.17–1.22)
MONOCYTES NFR BLD AUTO: 6 % (ref 2–12)
NEUTROPHILS # BLD AUTO: 5.4 THOUSANDS/ΜL (ref 1.4–6.5)
NEUTS SEG NFR BLD AUTO: 58 % (ref 42–75)
PLATELET # BLD AUTO: 296 THOUSANDS/UL (ref 149–390)
PMV BLD AUTO: 8.7 FL (ref 8.6–11.7)
POTASSIUM SERPL-SCNC: 3.4 MMOL/L (ref 3.5–5.5)
RBC # BLD AUTO: 4.37 MILLION/UL (ref 3.9–5.2)
SODIUM SERPL-SCNC: 138 MMOL/L (ref 134–143)
WBC # BLD AUTO: 9.3 THOUSAND/UL (ref 4.8–10.8)

## 2019-10-26 PROCEDURE — 96360 HYDRATION IV INFUSION INIT: CPT

## 2019-10-26 PROCEDURE — 71046 X-RAY EXAM CHEST 2 VIEWS: CPT

## 2019-10-26 PROCEDURE — 85025 COMPLETE CBC W/AUTO DIFF WBC: CPT | Performed by: EMERGENCY MEDICINE

## 2019-10-26 PROCEDURE — 93005 ELECTROCARDIOGRAM TRACING: CPT

## 2019-10-26 PROCEDURE — 80048 BASIC METABOLIC PNL TOTAL CA: CPT | Performed by: EMERGENCY MEDICINE

## 2019-10-26 PROCEDURE — 85379 FIBRIN DEGRADATION QUANT: CPT | Performed by: EMERGENCY MEDICINE

## 2019-10-26 PROCEDURE — 36415 COLL VENOUS BLD VENIPUNCTURE: CPT | Performed by: EMERGENCY MEDICINE

## 2019-10-26 PROCEDURE — 99285 EMERGENCY DEPT VISIT HI MDM: CPT

## 2019-10-26 RX ORDER — HYDROCODONE BITARTRATE AND ACETAMINOPHEN 5; 325 MG/1; MG/1
1 TABLET ORAL ONCE
Status: COMPLETED | OUTPATIENT
Start: 2019-10-26 | End: 2019-10-26

## 2019-10-26 RX ORDER — DOXYCYCLINE HYCLATE 50 MG/1
100 CAPSULE ORAL ONCE
Status: COMPLETED | OUTPATIENT
Start: 2019-10-26 | End: 2019-10-26

## 2019-10-26 RX ORDER — DOXYCYCLINE HYCLATE 100 MG/1
100 CAPSULE ORAL 2 TIMES DAILY
Qty: 14 CAPSULE | Refills: 0 | Status: SHIPPED | OUTPATIENT
Start: 2019-10-26 | End: 2019-11-02

## 2019-10-26 RX ORDER — HYDROCODONE POLISTIREX AND CHLORPHENIRAMINE POLISTIREX 10; 8 MG/5ML; MG/5ML
5 SUSPENSION, EXTENDED RELEASE ORAL EVERY 12 HOURS PRN
Qty: 100 ML | Refills: 0 | Status: SHIPPED | OUTPATIENT
Start: 2019-10-26 | End: 2019-11-05

## 2019-10-26 RX ADMIN — SODIUM CHLORIDE 1000 ML: 0.9 INJECTION, SOLUTION INTRAVENOUS at 20:47

## 2019-10-26 RX ADMIN — HYDROCODONE BITARTRATE AND ACETAMINOPHEN 1 TABLET: 5; 325 TABLET ORAL at 20:58

## 2019-10-26 RX ADMIN — DOXYCYCLINE HYCLATE 100 MG: 50 CAPSULE ORAL at 21:51

## 2019-10-27 LAB
ATRIAL RATE: 109 BPM
P AXIS: 44 DEGREES
PR INTERVAL: 128 MS
QRS AXIS: 39 DEGREES
QRSD INTERVAL: 90 MS
QT INTERVAL: 366 MS
QTC INTERVAL: 492 MS
T WAVE AXIS: 57 DEGREES
VENTRICULAR RATE: 109 BPM

## 2019-10-27 PROCEDURE — 93010 ELECTROCARDIOGRAM REPORT: CPT | Performed by: INTERNAL MEDICINE

## 2019-10-27 NOTE — ED NOTES
Pt asking for pain meds  Pt asking what she's supposed to do until the "pill kicks in " (Pain has already gone from 8/10 to 5/10 which patient stated is tolerable for her  Pt also wanted pain meds for home  Dr Sherri Soriano notified       Parmjit Escamilla, CISCO  10/26/19 1460

## 2019-10-27 NOTE — DISCHARGE INSTRUCTIONS
STAY WELL HYDRATED WITH PLENTY OF WATER  YOUR HEART RATE IS MUCH IMPROVED  CHEST X-RAY DOES NOT SHOW ANY ACUTE PNEUMONIA  TAKE DOXYCYCLINE TWICE DAILY FOR 7 DAYS FOR THE BRONCHITIS, WITH FOOD AND PLENTY OF WATER!    USE TYLENOL FOR PAIN  ADDITIONALLY, USE A LIDODERM PATCH FOR YOUR CHEST PAIN  IF NEEDED USE THE COUGH MEDICINE CAREFULLY

## 2019-10-27 NOTE — ED PROVIDER NOTES
History  Chief Complaint   Patient presents with    Cough     PT STATES SHE THINKS SHE IS GETTING BRONCHITIS, COUGH, FEVER AND CHEST PAIN WITH INSPIRATION     Chest Pain     PATIENT REPORTS CHEST PAIN, MOVING HER HAND UP AND DOWN HER STERNUM TOWARDS HER THROAT  SHE REPORTS IT IS WORSE WITH HER COUGH AND NOTES THAT SHE BELIEVES SHE IS GETTING BRONCHITIS  SHE HAS OCCASIONAL SPUTUM PRODUCTION THAT IS SLIGHTLY YELLOW  SHE DENIES A MEASURED FEVER BUT HAS HAD CHILLS  SHE DENIES ANY RADIATION OF HER CHEST PAIN  DENIES DYSPNEA WITH RESPECT TO HER CHEST PAIN  SHE QUIT SMOKING SEVERAL MONTHS AGO  Prior to Admission Medications   Prescriptions Last Dose Informant Patient Reported? Taking?   dexlansoprazole (DEXILANT) 30 MG capsule   Yes No   Sig: Take 30 mg by mouth daily   dicyclomine (BENTYL) 20 mg tablet   No No   Sig: Take 1 tablet (20 mg total) by mouth 2 (two) times a day      Facility-Administered Medications: None       Past Medical History:   Diagnosis Date    Back pain     Hodgkin's lymphoma ([facility] [address] )     Migraine     Unspecified ovarian cysts        Past Surgical History:   Procedure Laterality Date    APPENDECTOMY      CHOLECYSTECTOMY      HYSTERECTOMY      LYMPH NODE BIOPSY      left neck    MEDIPORT INSERTION, SINGLE      MEDIPORT REMOVAL      TUBAL LIGATION         History reviewed  No pertinent family history  I have reviewed and agree with the history as documented  Social History     Tobacco Use    Smoking status: Former Smoker     Packs/day: 0 50     Types: Cigarettes     Last attempt to quit: 3/24/2019     Years since quittin 5    Smokeless tobacco: Never Used   Substance Use Topics    Alcohol use: No    Drug use: No        Review of Systems   Constitutional: Positive for chills  Negative for fever  HENT: Positive for postnasal drip, rhinorrhea and sinus pressure  Negative for sore throat  Respiratory: Positive for cough  Negative for shortness of breath  Cardiovascular: Positive for chest pain  Negative for palpitations and leg swelling  Gastrointestinal: Negative for abdominal pain  Musculoskeletal: Negative  Neurological: Negative  All other systems reviewed and are negative  Physical Exam  Physical Exam   Constitutional: She is oriented to person, place, and time  She appears well-developed and well-nourished  HENT:   Head: Normocephalic and atraumatic  Eyes: Conjunctivae and EOM are normal    Neck: Normal range of motion  Cardiovascular: Regular rhythm and normal heart sounds  TACHYCARDIC    Pulmonary/Chest: Effort normal and breath sounds normal  No respiratory distress  She exhibits tenderness  PATIENT DEMONSTRATES A HARSH COUGH, SPONTANEOUSLY ON OCCASION  Abdominal: Soft  Bowel sounds are normal    Musculoskeletal: Normal range of motion  She exhibits no deformity  Neurological: She is alert and oriented to person, place, and time  No cranial nerve deficit  She exhibits normal muscle tone  Skin: Skin is warm  Capillary refill takes less than 2 seconds  No rash noted  There is erythema         Vital Signs  ED Triage Vitals [10/26/19 1946]   Temperature Pulse Respirations Blood Pressure SpO2   98 9 °F (37 2 °C) (!) 130 18 144/96 100 %      Temp Source Heart Rate Source Patient Position - Orthostatic VS BP Location FiO2 (%)   Temporal Monitor -- Left arm --      Pain Score       8           Vitals:    10/26/19 1946 10/26/19 2147   BP: 144/96 118/55   Pulse: (!) 130 79         Visual Acuity      ED Medications  Medications   sodium chloride 0 9 % bolus 1,000 mL (0 mL Intravenous Stopped 10/26/19 2151)   HYDROcodone-acetaminophen (NORCO) 5-325 mg per tablet 1 tablet (1 tablet Oral Given 10/26/19 2058)   doxycycline hyclate (VIBRAMYCIN) capsule 100 mg (100 mg Oral Given 10/26/19 2151)       Diagnostic Studies  Results Reviewed     Procedure Component Value Units Date/Time    Basic metabolic panel [[id number]]  (Abnormal) Collected:  10/26/19 2027    Lab Status:  Final result Specimen:  Blood from Arm, Left Updated:  10/26/19 2057     Sodium 138 mmol/L      Potassium 3 4 mmol/L      Chloride 105 mmol/L      CO2 26 mmol/L      ANION GAP 7 mmol/L      BUN 7 mg/dL      Creatinine 0 76 mg/dL      Glucose 100 mg/dL      Calcium 9 8 mg/dL      eGFR 96 ml/min/1 73sq m     Narrative:       [place] guidelines for Chronic Kidney Disease (CKD):     Stage 1 with normal or high GFR (GFR > 90 mL/min/1 73 square meters)    Stage 2 Mild CKD (GFR = 60-89 mL/min/1 73 square meters)    Stage 3A Moderate CKD (GFR = 45-59 mL/min/1 73 square meters)    Stage 3B Moderate CKD (GFR = 30-44 mL/min/1 73 square meters)    Stage 4 Severe CKD (GFR = 15-29 mL/min/1 73 square meters)    Stage 5 End Stage CKD (GFR <15 mL/min/1 73 square meters)  Note: GFR calculation is accurate only with a steady state creatinine    D-Dimer [[id number]]  (Normal) Collected:  10/26/19 2027    Lab Status:  Final result Specimen:  Blood from Arm, Left Updated:  10/26/19 2048     D-Dimer, Quant <0 15 ug/ml FEU     CBC and differential [[id number]]  (Abnormal) Collected:  10/26/19 2027    Lab Status:  Final result Specimen:  Blood from Arm, Left Updated:  10/26/19 2034     WBC 9 30 Thousand/uL      RBC 4 37 Million/uL      Hemoglobin 14 5 g/dL      Hematocrit 41 8 %      MCV 96 fL      MCH 33 2 pg      MCHC 34 7 g/dL      RDW 13 1 %      MPV 8 7 fL      Platelets 725 Thousands/uL      Neutrophils Relative 58 %      Lymphocytes Relative 30 %      Monocytes Relative 6 %      Eosinophils Relative 5 %      Basophils Relative 1 %      Neutrophils Absolute 5 40 Thousands/µL      Lymphocytes Absolute 2 80 Thousands/µL      Monocytes Absolute 0 50 Thousand/µL      Eosinophils Absolute 0 40 Thousand/µL      Basophils Absolute 0 10 Thousands/µL                  XR chest 2 views   ED Interpretation by Trey Mendez DO (10/26 2142)   NO INFILTRATE APPRECIATED  Final Result by Severa Flair, MD (10/27 1048)      No acute cardiopulmonary disease  Workstation performed: [id number]                    Procedures  ECG 12 Lead Documentation Only  Date/Time: 10/27/2019 7:08 AM  Performed by: Noy Mendosa DO  Authorized by: Noy Mendosa DO     Indications / Diagnosis:  CP  ECG reviewed by me, the ED Provider: yes    Patient location:  ED  Previous ECG:     Previous ECG:  Unavailable  Interpretation:     Interpretation: non-specific    Rate:     ECG rate:  109    ECG rate assessment: tachycardic    Rhythm:     Rhythm: sinus tachycardia    Ectopy:     Ectopy: none    QRS:     QRS axis:  Normal  Conduction:     Conduction: normal    ST segments:     ST segments:  Normal  T waves:     T waves: normal             ED Course       EXAMINATION EVALUATION  PATIENT PRESENTED WITH A COUGH, A FEELING BECOMING ILL AS WELL AS SEVERE CHEST PAIN AND SHORTNESS OF BREATH  VITAL SIGNS WERE SIGNIFICANT WITH TACHYCARDIA UP   HOWEVER SHE DID INDICATE THAT SHE HAS NOT BEEN EATING OR DRINKING BECAUSE OF A FREQUENT COUGH  WITH IV FLUIDS HER HEART RATE IMPROVED DRAMATICALLY  D-DIMER WAS NEGATIVE  THEREFORE PLAIN X-RAY IMAGING ORDERED, NO OBVIOUS   INFILTRATE  PATIENT DID DEMONSTRATE A HARSH COUGH AT TIMES WHICH APPEARED RATHER UNCOMFORTABLE  SHE WAS GIVEN DOXYCYCLINE AS WELL AS A TABLET OF VICODIN FOR THE PAIN  OVERALL, I BELIEVE SHE IS APPROPRIATE FOR DISCHARGE AT THIS TIME WITH ORAL DOXYCYCLINE TO USE FOR 7 DAYS  MDM    Disposition  Final diagnoses:   Bronchitis     Time reflects when diagnosis was documented in both MDM as applicable and the Disposition within this note     Time User Action Codes Description Comment    10/26/2019  9:43 PM Ayesha Harris 24 Bronchitis       ED Disposition     ED Disposition Condition Date/Time Comment    Discharge Stable Sat Oct 26, 2019  9:43 PM Maura Mcnally discharge to home/self care  Follow-up Information    None         Discharge Medication List as of 10/26/2019  9:50 PM      START taking these medications    Details   doxycycline hyclate (VIBRAMYCIN) 100 mg capsule Take 1 capsule (100 mg total) by mouth 2 (two) times a day for 7 days, Starting Sat 10/26/2019, Until Sat 11/2/2019, Normal      hydrocodone-chlorpheniramine polistirex (TUSSIONEX) 10-8 mg/5 mL ER suspension Take 5 mL by mouth every 12 (twelve) hours as needed for cough for up to 10 days MULTIPLE ATTEMPTS TO SEND THIS PRESCRIPTION ELECTRONICALLY FAILEDMax Daily Amount: 10 mL, Starting Sat 10/26/2019, Until Tue 11/5/2019, Print         CONTINUE these medications which have NOT CHANGED    Details   dexlansoprazole (DEXILANT) 30 MG capsule Take 30 mg by mouth daily, Historical Med      dicyclomine (BENTYL) 20 mg tablet Take 1 tablet (20 mg total) by mouth 2 (two) times a day, Starting Sun 8/25/2019, Print           No discharge procedures on file      ED Provider  Electronically Signed by           Pao Cyr DO  10/28/19 4173

## 2019-12-27 ENCOUNTER — HOSPITAL ENCOUNTER (EMERGENCY)
Facility: HOSPITAL | Age: 44
Discharge: LEFT BEFORE DISCHARGE | End: 2019-12-28
Attending: EMERGENCY MEDICINE | Admitting: EMERGENCY MEDICINE
Payer: COMMERCIAL

## 2019-12-27 DIAGNOSIS — R07.89 ATYPICAL CHEST PAIN: Primary | ICD-10-CM

## 2019-12-27 LAB
ALBUMIN SERPL BCP-MCNC: 4.2 G/DL (ref 3.5–5.7)
ALP SERPL-CCNC: 54 U/L (ref 40–150)
ALT SERPL W P-5'-P-CCNC: 23 U/L (ref 7–52)
ANION GAP SERPL CALCULATED.3IONS-SCNC: 8 MMOL/L (ref 4–13)
AST SERPL W P-5'-P-CCNC: 22 U/L (ref 13–39)
BASOPHILS # BLD AUTO: 0.1 THOUSANDS/ΜL (ref 0–0.1)
BASOPHILS NFR BLD AUTO: 1 % (ref 0–2)
BILIRUB SERPL-MCNC: 0.5 MG/DL (ref 0.2–1)
BUN SERPL-MCNC: 9 MG/DL (ref 7–25)
CALCIUM SERPL-MCNC: 9.4 MG/DL (ref 8.6–10.5)
CHLORIDE SERPL-SCNC: 103 MMOL/L (ref 98–107)
CO2 SERPL-SCNC: 23 MMOL/L (ref 21–31)
CREAT SERPL-MCNC: 0.65 MG/DL (ref 0.6–1.2)
D DIMER PPP FEU-MCNC: 0.16 UG/ML FEU
EOSINOPHIL # BLD AUTO: 0.2 THOUSAND/ΜL (ref 0–0.61)
EOSINOPHIL NFR BLD AUTO: 1 % (ref 0–5)
ERYTHROCYTE [DISTWIDTH] IN BLOOD BY AUTOMATED COUNT: 12.6 % (ref 11.5–14.5)
GFR SERPL CREATININE-BSD FRML MDRD: 108 ML/MIN/1.73SQ M
GLUCOSE SERPL-MCNC: 99 MG/DL (ref 65–99)
HCT VFR BLD AUTO: 40.8 % (ref 42–47)
HGB BLD-MCNC: 14.2 G/DL (ref 12–16)
LYMPHOCYTES # BLD AUTO: 2.6 THOUSANDS/ΜL (ref 0.6–4.47)
LYMPHOCYTES NFR BLD AUTO: 23 % (ref 21–51)
MCH RBC QN AUTO: 32.6 PG (ref 26–34)
MCHC RBC AUTO-ENTMCNC: 34.8 G/DL (ref 31–37)
MCV RBC AUTO: 94 FL (ref 81–99)
MONOCYTES # BLD AUTO: 0.4 THOUSAND/ΜL (ref 0.17–1.22)
MONOCYTES NFR BLD AUTO: 4 % (ref 2–12)
NEUTROPHILS # BLD AUTO: 7.9 THOUSANDS/ΜL (ref 1.4–6.5)
NEUTS SEG NFR BLD AUTO: 71 % (ref 42–75)
PLATELET # BLD AUTO: 281 THOUSANDS/UL (ref 149–390)
PMV BLD AUTO: 9.1 FL (ref 8.6–11.7)
POTASSIUM SERPL-SCNC: 4.6 MMOL/L (ref 3.5–5.5)
PROT SERPL-MCNC: 7.3 G/DL (ref 6.4–8.9)
RBC # BLD AUTO: 4.36 MILLION/UL (ref 3.9–5.2)
SODIUM SERPL-SCNC: 134 MMOL/L (ref 134–143)
TROPONIN I SERPL-MCNC: <0.03 NG/ML
WBC # BLD AUTO: 11.1 THOUSAND/UL (ref 4.8–10.8)

## 2019-12-27 PROCEDURE — 96374 THER/PROPH/DIAG INJ IV PUSH: CPT

## 2019-12-27 PROCEDURE — 36415 COLL VENOUS BLD VENIPUNCTURE: CPT | Performed by: EMERGENCY MEDICINE

## 2019-12-27 PROCEDURE — 85025 COMPLETE CBC W/AUTO DIFF WBC: CPT | Performed by: EMERGENCY MEDICINE

## 2019-12-27 PROCEDURE — 85379 FIBRIN DEGRADATION QUANT: CPT | Performed by: EMERGENCY MEDICINE

## 2019-12-27 PROCEDURE — 80053 COMPREHEN METABOLIC PANEL: CPT | Performed by: EMERGENCY MEDICINE

## 2019-12-27 PROCEDURE — 84484 ASSAY OF TROPONIN QUANT: CPT | Performed by: EMERGENCY MEDICINE

## 2019-12-27 PROCEDURE — 99285 EMERGENCY DEPT VISIT HI MDM: CPT

## 2019-12-27 PROCEDURE — 99284 EMERGENCY DEPT VISIT MOD MDM: CPT | Performed by: EMERGENCY MEDICINE

## 2019-12-27 PROCEDURE — 93005 ELECTROCARDIOGRAM TRACING: CPT

## 2019-12-27 PROCEDURE — 96361 HYDRATE IV INFUSION ADD-ON: CPT

## 2019-12-27 RX ORDER — ACETAMINOPHEN 325 MG/1
650 TABLET ORAL ONCE
Status: DISCONTINUED | OUTPATIENT
Start: 2019-12-27 | End: 2019-12-28 | Stop reason: HOSPADM

## 2019-12-27 RX ORDER — ASPIRIN 81 MG/1
324 TABLET, CHEWABLE ORAL ONCE
Status: COMPLETED | OUTPATIENT
Start: 2019-12-27 | End: 2019-12-27

## 2019-12-27 RX ORDER — ONDANSETRON 2 MG/ML
4 INJECTION INTRAMUSCULAR; INTRAVENOUS ONCE
Status: COMPLETED | OUTPATIENT
Start: 2019-12-27 | End: 2019-12-27

## 2019-12-27 RX ORDER — SODIUM CHLORIDE 9 MG/ML
3 INJECTION INTRAVENOUS AS NEEDED
Status: DISCONTINUED | OUTPATIENT
Start: 2019-12-27 | End: 2019-12-28 | Stop reason: HOSPADM

## 2019-12-27 RX ORDER — NITROGLYCERIN 0.4 MG/1
0.4 TABLET SUBLINGUAL ONCE
Status: COMPLETED | OUTPATIENT
Start: 2019-12-27 | End: 2019-12-27

## 2019-12-27 RX ADMIN — ONDANSETRON 4 MG: 2 INJECTION INTRAMUSCULAR; INTRAVENOUS at 23:48

## 2019-12-27 RX ADMIN — NITROGLYCERIN 0.4 MG: 0.4 TABLET SUBLINGUAL at 21:07

## 2019-12-27 RX ADMIN — SODIUM CHLORIDE 1000 ML: 0.9 INJECTION, SOLUTION INTRAVENOUS at 23:24

## 2019-12-27 RX ADMIN — ASPIRIN 81 MG 324 MG: 81 TABLET ORAL at 21:07

## 2019-12-28 ENCOUNTER — APPOINTMENT (EMERGENCY)
Dept: CT IMAGING | Facility: HOSPITAL | Age: 44
End: 2019-12-28
Payer: COMMERCIAL

## 2019-12-28 VITALS
RESPIRATION RATE: 18 BRPM | HEART RATE: 100 BPM | WEIGHT: 181 LBS | SYSTOLIC BLOOD PRESSURE: 133 MMHG | DIASTOLIC BLOOD PRESSURE: 85 MMHG | HEIGHT: 65 IN | TEMPERATURE: 98.5 F | BODY MASS INDEX: 30.16 KG/M2 | OXYGEN SATURATION: 95 %

## 2019-12-28 PROCEDURE — 96361 HYDRATE IV INFUSION ADD-ON: CPT

## 2019-12-28 PROCEDURE — 71250 CT THORAX DX C-: CPT

## 2019-12-28 PROCEDURE — 74176 CT ABD & PELVIS W/O CONTRAST: CPT

## 2019-12-28 PROCEDURE — 96372 THER/PROPH/DIAG INJ SC/IM: CPT

## 2019-12-28 RX ORDER — IBUPROFEN 800 MG/1
800 TABLET ORAL EVERY 6 HOURS PRN
Qty: 30 TABLET | Refills: 0 | Status: SHIPPED | OUTPATIENT
Start: 2019-12-28

## 2019-12-28 RX ORDER — FENTANYL CITRATE 50 UG/ML
50 INJECTION, SOLUTION INTRAMUSCULAR; INTRAVENOUS ONCE
Status: COMPLETED | OUTPATIENT
Start: 2019-12-28 | End: 2019-12-28

## 2019-12-28 RX ADMIN — FENTANYL CITRATE 50 MCG: 50 INJECTION INTRAMUSCULAR; INTRAVENOUS at 01:39

## 2019-12-28 NOTE — ED PROVIDER NOTES
History  Chief Complaint   Patient presents with    Chest Pain     pt states that she was in [facility] ER earlier today, had troponins and EKG  Pt was told that she doesnt't have a heart attack  Pt comes to ER because she is still in pain  This is a 80-year-old female chief complaint chest pain read, it showed AFib with period patient has a history of lymphoma diagnosed in   She was treated until   She no longer follows up with Oncology  She states currently having chest pain she has worsened over the past day  It is nonradiating  It is associated with shortness of breath  She states it feels similar to her previous lymphoma when it was initially diagnosed  States that is getting worse  She was seen at he has the emergency department with a completed a cardiac workup and discharged her  She states she is continuing to have severe pain  She has not had any episodes of syncope she has had some chills  No night sweats, no weight loss  No cough  Patient does have a history of esophageal strictures which are no longer treated  She feels though she is having difficulty passing solids  None       Past Medical History:   Diagnosis Date    Back pain     Hodgkin's lymphoma ([address] )     Migraine     Unspecified ovarian cysts        Past Surgical History:   Procedure Laterality Date    APPENDECTOMY      CHOLECYSTECTOMY      HYSTERECTOMY      LYMPH NODE BIOPSY      left neck    MEDIPORT INSERTION, SINGLE      MEDIPORT REMOVAL      TUBAL LIGATION         History reviewed  No pertinent family history  I have reviewed and agree with the history as documented  Social History     Tobacco Use    Smoking status: Former Smoker     Packs/day: 0 50     Types: Cigarettes     Last attempt to quit: 3/24/2019     Years since quittin 7    Smokeless tobacco: Never Used   Substance Use Topics    Alcohol use: No    Drug use: No        Review of Systems   Constitutional: Positive for chills  Negative for activity change, fatigue and fever  HENT: Negative for congestion  Eyes: Negative for visual disturbance  Respiratory: Positive for chest tightness and shortness of breath  Cardiovascular: Positive for chest pain  Gastrointestinal: Positive for nausea and vomiting  Negative for abdominal pain and diarrhea  Genitourinary: Negative for dysuria  Neurological: Negative for dizziness, weakness and numbness  Physical Exam  Physical Exam   Constitutional: She is oriented to person, place, and time  She appears well-developed and well-nourished  HENT:   Head: Normocephalic and atraumatic  Eyes: Pupils are equal, round, and reactive to light  Conjunctivae and EOM are normal    Neck: Normal range of motion  Neck supple  Cardiovascular: Normal heart sounds  Sinus tachycardia   Pulmonary/Chest: Effort normal and breath sounds normal  No respiratory distress  Abdominal: Soft  Bowel sounds are normal    Musculoskeletal: Normal range of motion  Chest pain reproducible to palpation over the sternum   Neurological: She is alert and oriented to person, place, and time  Skin: Skin is warm and dry  Capillary refill takes less than 2 seconds  Arms are covered and excoriations   Psychiatric: Her mood appears anxious  She is agitated         Vital Signs  ED Triage Vitals   Temperature Pulse Respirations Blood Pressure SpO2   12/27/19 2030 12/27/19 2035 12/27/19 2035 12/27/19 2035 12/27/19 2035   98 5 °F (36 9 °C) (!) 121 18 119/66 100 %      Temp Source Heart Rate Source Patient Position - Orthostatic VS BP Location FiO2 (%)   12/27/19 2030 12/27/19 2035 12/27/19 2035 12/27/19 2035 --   Temporal Monitor Sitting Left arm       Pain Score       12/27/19 2030       Worst Possible Pain           Vitals:    12/27/19 2230 12/27/19 2300 12/27/19 2315 12/28/19 0315   BP: 123/80 124/83 138/84 133/85   Pulse: 102 100 99 100   Patient Position - Orthostatic VS:    Sitting         Visual Acuity      ED Medications  Medications   sodium chloride (PF) 0 9 % injection 3 mL (has no administration in time range)   acetaminophen (TYLENOL) tablet 650 mg (650 mg Oral Not Given 12/28/19 0016)   aspirin chewable tablet 324 mg (324 mg Oral Given 12/27/19 2107)   sodium chloride 0 9 % bolus 1,000 mL (0 mL Intravenous Stopped 12/28/19 0140)   nitroglycerin (NITROSTAT) SL tablet 0 4 mg (0 4 mg Sublingual Given 12/27/19 2107)   ondansetron (ZOFRAN) injection 4 mg (4 mg Intravenous Given 12/27/19 2348)   fentanyl citrate (PF) 100 MCG/2ML 50 mcg (50 mcg Intramuscular Given 12/28/19 0139)       Diagnostic Studies  Results Reviewed     Procedure Component Value Units Date/Time    Comprehensive metabolic panel [[id number]] Collected:  12/27/19 2324    Lab Status:  Final result Specimen:  Blood from Arm, Left Updated:  12/27/19 2359     Sodium 134 mmol/L      Potassium 4 6 mmol/L      Chloride 103 mmol/L      CO2 23 mmol/L      ANION GAP 8 mmol/L      BUN 9 mg/dL      Creatinine 0 65 mg/dL      Glucose 99 mg/dL      Calcium 9 4 mg/dL      AST 22 U/L      ALT 23 U/L      Alkaline Phosphatase 54 U/L      Total Protein 7 3 g/dL      Albumin 4 2 g/dL      Total Bilirubin 0 50 mg/dL      eGFR 108 ml/min/1 73sq m     Narrative:       [place] guidelines for Chronic Kidney Disease (CKD):     Stage 1 with normal or high GFR (GFR > 90 mL/min/1 73 square meters)    Stage 2 Mild CKD (GFR = 60-89 mL/min/1 73 square meters)    Stage 3A Moderate CKD (GFR = 45-59 mL/min/1 73 square meters)    Stage 3B Moderate CKD (GFR = 30-44 mL/min/1 73 square meters)    Stage 4 Severe CKD (GFR = 15-29 mL/min/1 73 square meters)    Stage 5 End Stage CKD (GFR <15 mL/min/1 73 square meters)  Note: GFR calculation is accurate only with a steady state creatinine    Troponin I [[id number]]  (Normal) Collected:  12/27/19 2324    Lab Status:  Final result Specimen:  Blood from Arm, Left Updated:  12/27/19 2359     Troponin I <0 03 ng/mL     D-dimer, quantitative [[id number]]  (Normal) Collected:  12/27/19 2324    Lab Status:  Final result Specimen:  Blood from Arm, Left Updated:  12/27/19 2349     D-Dimer, Quant 0 16 ug/ml FEU     CBC and differential [[id number]]  (Abnormal) Collected:  12/27/19 2324    Lab Status:  Final result Specimen:  Blood from Arm, Left Updated:  12/27/19 2340     WBC 11 10 Thousand/uL      RBC 4 36 Million/uL      Hemoglobin 14 2 g/dL      Hematocrit 40 8 %      MCV 94 fL      MCH 32 6 pg      MCHC 34 8 g/dL      RDW 12 6 %      MPV 9 1 fL      Platelets 163 Thousands/uL      Neutrophils Relative 71 %      Lymphocytes Relative 23 %      Monocytes Relative 4 %      Eosinophils Relative 1 %      Basophils Relative 1 %      Neutrophils Absolute 7 90 Thousands/µL      Lymphocytes Absolute 2 60 Thousands/µL      Monocytes Absolute 0 40 Thousand/µL      Eosinophils Absolute 0 20 Thousand/µL      Basophils Absolute 0 10 Thousands/µL                  CT chest abdomen pelvis wo contrast    (Results Pending)              Procedures  Procedures         ED Course                         Wells' Criteria for PE      Most Recent Value   Wells' Criteria for PE   Clinical signs and symptoms of DVT  0 Filed at: 12/28/2019 0102   PE is primary diagnosis or equally likely  0 Filed at: 12/28/2019 0102   HR >100  1 5 Filed at: 12/28/2019 0102   Immobilization at least 3 days or Surgery in the previous 4 weeks  0 Filed at: 12/28/2019 0102   Previous, objectively diagnosed PE or DVT  0 Filed at: 12/28/2019 0102   Hemoptysis  0 Filed at: 12/28/2019 0102   Malignancy with treatment within 6 months or palliative  0 Filed at: 12/28/2019 0102   Wells' Criteria Total  1 5 Filed at: 12/28/2019 0102            MDM  Number of Diagnoses or Management Options  Diagnosis management comments: Recalculated Wells score  Patient is actually low risk  D-dimer was negative  Pulmonary embolism unlikely  A CT scan was attempted    IV became dislodged and patient had minor extravasation contrast dye  Ice pack was applied patient was observed  This is unlikely to be cardiac  EKG is without ischemia  Troponin is negative  Patient was seen at another emergency room earlier and likewise had negative troponin  This would be atypical for dissection  On physical exam patient does have reproducible chest wall pain  This is likely costochondritis  Patient has been in remission from Hodgkin's lymphoma for a long time  She was worried that it might have recurred because when she was diagnosed, she had similar chest pain  Patient underwent noncontrast scan to rule out chest mass  Awaiting Radiology results  However, patient does not want to wait  Will sign out AMA  She can follow up the results with her primary MD       Amount and/or Complexity of Data Reviewed  Clinical lab tests: ordered and reviewed  Tests in the radiology section of CPT®: ordered and reviewed  Discuss the patient with other providers: yes  Independent visualization of images, tracings, or specimens: yes          Disposition  Final diagnoses:   Atypical chest pain     Time reflects when diagnosis was documented in both MDM as applicable and the Disposition within this note     Time User Action Codes Description Comment    12/28/2019  4:08 AM Torres Nguyen Add [R07 [id number]] Atypical chest pain       ED Disposition     ED Disposition Condition Date/Time Comment    Discharge Stable Sat Dec 28, 2019  4:08 AM Bill Jaime discharge to home/self care  Follow-up Information     Follow up With Specialties Details Why Contact Info    [employer]   Follow-up with family doctor early next week for re-evaluation, sooner if any problems    Call for referral [phone]            Patient's Medications   Discharge Prescriptions    IBUPROFEN (MOTRIN) 800 MG TABLET    Take 1 tablet (800 mg total) by mouth every 6 (six) hours as needed (Pain)       Start Date: 12/28/2019End Date: --       Order Dose: 800 mg       Quantity: 30 tablet    Refills: 0     No discharge procedures on file      ED Provider  Electronically Signed by           Charley López MD  12/28/19 [address] [facility]  12/28/19 8317

## 2019-12-28 NOTE — ED NOTES
Pt speaking with this RN and states "I'm just going to go " Dr Nai Layton informed of same  Pt to sign AMA prior to departure        Cheyenne Romo RN  12/28/19 7314

## 2019-12-28 NOTE — ED NOTES
I attempted to obtain IV access x 1, shanice rae attempted x2 with no success  Radha Bazzi now at bedside to attempt  Pt with 2 episodes of vomiting in ER         Mauro Newman RN  12/27/19 9941

## 2019-12-29 ENCOUNTER — APPOINTMENT (EMERGENCY)
Dept: RADIOLOGY | Facility: HOSPITAL | Age: 44
End: 2019-12-29
Payer: COMMERCIAL

## 2019-12-29 ENCOUNTER — HOSPITAL ENCOUNTER (EMERGENCY)
Facility: HOSPITAL | Age: 44
Discharge: HOME | End: 2019-12-29
Attending: EMERGENCY MEDICINE
Payer: COMMERCIAL

## 2019-12-29 VITALS
DIASTOLIC BLOOD PRESSURE: 74 MMHG | BODY MASS INDEX: 30.08 KG/M2 | SYSTOLIC BLOOD PRESSURE: 120 MMHG | TEMPERATURE: 100.1 F | RESPIRATION RATE: 22 BRPM | WEIGHT: 180.56 LBS | OXYGEN SATURATION: 98 % | HEART RATE: 92 BPM | HEIGHT: 65 IN

## 2019-12-29 DIAGNOSIS — J18.9 PNEUMONIA OF RIGHT LUNG DUE TO INFECTIOUS ORGANISM, UNSPECIFIED PART OF LUNG: Primary | ICD-10-CM

## 2019-12-29 LAB
ANION GAP SERPL CALCULATED.3IONS-SCNC: 10 MMOL/L (ref 4–13)
BASOPHILS # BLD AUTO: 0.1 THOUSANDS/ΜL (ref 0–0.1)
BASOPHILS NFR BLD AUTO: 1 % (ref 0–1)
BUN SERPL-MCNC: 7 MG/DL (ref 5–25)
CALCIUM SERPL-MCNC: 9.2 MG/DL (ref 8.3–10.1)
CHLORIDE SERPL-SCNC: 104 MMOL/L (ref 100–108)
CO2 SERPL-SCNC: 25 MMOL/L (ref 21–32)
CREAT SERPL-MCNC: 0.74 MG/DL (ref 0.6–1.3)
EOSINOPHIL # BLD AUTO: 0.3 THOUSAND/ΜL (ref 0–0.61)
EOSINOPHIL NFR BLD AUTO: 4 % (ref 0–6)
ERYTHROCYTE [DISTWIDTH] IN BLOOD BY AUTOMATED COUNT: 12.6 % (ref 11.6–15.1)
GFR SERPL CREATININE-BSD FRML MDRD: 99 ML/MIN/1.73SQ M
GLUCOSE SERPL-MCNC: 95 MG/DL (ref 65–140)
HCT VFR BLD AUTO: 40.3 % (ref 34.8–46.1)
HGB BLD-MCNC: 13.6 G/DL (ref 11.5–15.4)
IMM GRANULOCYTES # BLD AUTO: 0.02 THOUSAND/UL (ref 0–0.2)
IMM GRANULOCYTES NFR BLD AUTO: 0 % (ref 0–2)
LYMPHOCYTES # BLD AUTO: 2.14 THOUSANDS/ΜL (ref 0.6–4.47)
LYMPHOCYTES NFR BLD AUTO: 28 % (ref 14–44)
MCH RBC QN AUTO: 32.6 PG (ref 26.8–34.3)
MCHC RBC AUTO-ENTMCNC: 33.7 G/DL (ref 31.4–37.4)
MCV RBC AUTO: 97 FL (ref 82–98)
MONOCYTES # BLD AUTO: 0.59 THOUSAND/ΜL (ref 0.17–1.22)
MONOCYTES NFR BLD AUTO: 8 % (ref 4–12)
NEUTROPHILS # BLD AUTO: 4.53 THOUSANDS/ΜL (ref 1.85–7.62)
NEUTS SEG NFR BLD AUTO: 59 % (ref 43–75)
NRBC BLD AUTO-RTO: 0 /100 WBCS
PLATELET # BLD AUTO: 272 THOUSANDS/UL (ref 149–390)
PMV BLD AUTO: 11 FL (ref 8.9–12.7)
POTASSIUM SERPL-SCNC: 4.1 MMOL/L (ref 3.5–5.3)
RBC # BLD AUTO: 4.17 MILLION/UL (ref 3.81–5.12)
SODIUM SERPL-SCNC: 139 MMOL/L (ref 136–145)
TROPONIN I SERPL-MCNC: <0.02 NG/ML
WBC # BLD AUTO: 7.68 THOUSAND/UL (ref 4.31–10.16)

## 2019-12-29 PROCEDURE — 99284 EMERGENCY DEPT VISIT MOD MDM: CPT | Performed by: PHYSICIAN ASSISTANT

## 2019-12-29 PROCEDURE — 80048 BASIC METABOLIC PNL TOTAL CA: CPT | Performed by: PHYSICIAN ASSISTANT

## 2019-12-29 PROCEDURE — 84484 ASSAY OF TROPONIN QUANT: CPT | Performed by: PHYSICIAN ASSISTANT

## 2019-12-29 PROCEDURE — 85025 COMPLETE CBC W/AUTO DIFF WBC: CPT | Performed by: PHYSICIAN ASSISTANT

## 2019-12-29 PROCEDURE — 71046 X-RAY EXAM CHEST 2 VIEWS: CPT

## 2019-12-29 PROCEDURE — 93005 ELECTROCARDIOGRAM TRACING: CPT

## 2019-12-29 PROCEDURE — 36415 COLL VENOUS BLD VENIPUNCTURE: CPT | Performed by: PHYSICIAN ASSISTANT

## 2019-12-29 PROCEDURE — 99285 EMERGENCY DEPT VISIT HI MDM: CPT

## 2019-12-29 PROCEDURE — 96361 HYDRATE IV INFUSION ADD-ON: CPT

## 2019-12-29 PROCEDURE — 96375 TX/PRO/DX INJ NEW DRUG ADDON: CPT

## 2019-12-29 PROCEDURE — 96374 THER/PROPH/DIAG INJ IV PUSH: CPT

## 2019-12-29 RX ORDER — AMOXICILLIN 250 MG/1
1000 CAPSULE ORAL ONCE
Status: COMPLETED | OUTPATIENT
Start: 2019-12-29 | End: 2019-12-29

## 2019-12-29 RX ORDER — AMOXICILLIN 500 MG/1
1000 CAPSULE ORAL 3 TIMES DAILY
Qty: 42 CAPSULE | Refills: 0 | Status: SHIPPED | OUTPATIENT
Start: 2019-12-29 | End: 2020-01-05

## 2019-12-29 RX ORDER — FENTANYL CITRATE 50 UG/ML
50 INJECTION, SOLUTION INTRAMUSCULAR; INTRAVENOUS ONCE
Status: COMPLETED | OUTPATIENT
Start: 2019-12-29 | End: 2019-12-29

## 2019-12-29 RX ORDER — ONDANSETRON 2 MG/ML
4 INJECTION INTRAMUSCULAR; INTRAVENOUS ONCE
Status: COMPLETED | OUTPATIENT
Start: 2019-12-29 | End: 2019-12-29

## 2019-12-29 RX ORDER — AZITHROMYCIN 250 MG/1
TABLET, FILM COATED ORAL
Qty: 6 TABLET | Refills: 0 | Status: SHIPPED | OUTPATIENT
Start: 2019-12-29 | End: 2020-01-02

## 2019-12-29 RX ORDER — TRAMADOL HYDROCHLORIDE 50 MG/1
50 TABLET ORAL EVERY 6 HOURS PRN
Qty: 8 TABLET | Refills: 0 | Status: SHIPPED | OUTPATIENT
Start: 2019-12-29 | End: 2019-12-31

## 2019-12-29 RX ORDER — SODIUM CHLORIDE 9 MG/ML
3 INJECTION INTRAVENOUS AS NEEDED
Status: DISCONTINUED | OUTPATIENT
Start: 2019-12-29 | End: 2019-12-29 | Stop reason: HOSPADM

## 2019-12-29 RX ADMIN — ONDANSETRON 4 MG: 2 INJECTION INTRAMUSCULAR; INTRAVENOUS at 19:56

## 2019-12-29 RX ADMIN — AMOXICILLIN 1000 MG: 250 CAPSULE ORAL at 19:55

## 2019-12-29 RX ADMIN — FENTANYL CITRATE 50 MCG: 50 INJECTION INTRAMUSCULAR; INTRAVENOUS at 19:55

## 2019-12-29 RX ADMIN — SODIUM CHLORIDE 1000 ML: 0.9 INJECTION, SOLUTION INTRAVENOUS at 20:02

## 2019-12-30 ENCOUNTER — TELEPHONE (OUTPATIENT)
Dept: INTERVENTIONAL RADIOLOGY/VASCULAR | Facility: HOSPITAL | Age: 44
End: 2019-12-30

## 2019-12-30 LAB
ATRIAL RATE: 114 BPM
ATRIAL RATE: 122 BPM
P AXIS: 51 DEGREES
P AXIS: 62 DEGREES
PR INTERVAL: 116 MS
PR INTERVAL: 118 MS
QRS AXIS: 31 DEGREES
QRS AXIS: 52 DEGREES
QRSD INTERVAL: 86 MS
QRSD INTERVAL: 88 MS
QT INTERVAL: 338 MS
QT INTERVAL: 358 MS
QTC INTERVAL: 481 MS
QTC INTERVAL: 493 MS
T WAVE AXIS: 52 DEGREES
T WAVE AXIS: 60 DEGREES
VENTRICULAR RATE: 114 BPM
VENTRICULAR RATE: 122 BPM

## 2019-12-30 PROCEDURE — 93010 ELECTROCARDIOGRAM REPORT: CPT | Performed by: INTERNAL MEDICINE

## 2019-12-30 NOTE — ED PROVIDER NOTES
History  Chief Complaint   Patient presents with    Chest Pain     pt has been seen in two previous EDs for chest pain and vomitting  pt signed out AMA from [id number] [employer] without knowing results  chest pain for three days thats been increasing      This is a 42-year-old female past medical history Hodgkin's lymphoma that presents to the ED complaining nausea and vomiting  Reports that her nausea and vomiting have been constant today and she has vomited approximately 5 times today and has been for the last few days  She states that 2 days ago [place] [facility] [place] the patient states felt like when she had metastasis from her previous Hodgkin's lymphoma that was diagnosed in 2003  She went into remission in 2009 and after 7 years of follow-up negative results from Hematology-Oncology the patient was discharged from their care  She stated that she was not satisfied with the care she received [place] [facility] and signed out of the hospital AMA  She states that on her way home she began to vomit in the car and noticed some blood in her vomit  She states that then she went to [address] [place] [address] where she complained of chest pain, received a full workup including CT scan but signed out AMA prior to hearing the results  Patient states she was frustrated that they were originally unable to get an IV, had to put one in her upper arm and the vein blew upon pushing contrast dye  She describes her chest pain today as sharp, intermittent and non radiating  She denies fever chills, shortness of breath, ear pain, eye pain, abdominal pain, dysuria, menstrual bleeding, leg pain, leg swelling, palpitations  Prior to Admission Medications   Prescriptions Last Dose Informant Patient Reported?  Taking?   ibuprofen (MOTRIN) 800 mg tablet   No No   Sig: Take 1 tablet (800 mg total) by mouth every 6 (six) hours as needed (Pain)      Facility-Administered Medications: None Past Medical History:   Diagnosis Date    Back pain     Hodgkin's lymphoma ([address] )     Migraine     Unspecified ovarian cysts        Past Surgical History:   Procedure Laterality Date    APPENDECTOMY      CHOLECYSTECTOMY      HYSTERECTOMY      LYMPH NODE BIOPSY      left neck    MEDIPORT INSERTION, SINGLE      MEDIPORT REMOVAL      TUBAL LIGATION         History reviewed  No pertinent family history  I have reviewed and agree with the history as documented  Social History     Tobacco Use    Smoking status: Former Smoker     Packs/day: 0 50     Types: Cigarettes     Last attempt to quit: 3/24/2019     Years since quittin 7    Smokeless tobacco: Never Used   Substance Use Topics    Alcohol use: No    Drug use: No        Review of Systems   Constitutional: Positive for appetite change and fatigue  Negative for chills and diaphoresis  Patient reports anorexia   HENT: Negative for congestion and ear pain  Eyes: Negative for pain and visual disturbance  Respiratory: Positive for cough  Negative for shortness of breath, wheezing and stridor  Cardiovascular: Positive for chest pain  Gastrointestinal: Negative for abdominal pain  Genitourinary: Negative for dysuria, flank pain, pelvic pain and urgency  Musculoskeletal: Positive for myalgias  Negative for arthralgias  Hematological: Negative for adenopathy  Physical Exam  Physical Exam   Constitutional: She is oriented to person, place, and time  She appears well-developed and well-nourished  Non-toxic appearance  She does not appear ill  No distress  HENT:   Head: Normocephalic and atraumatic  Eyes: EOM are normal    Neck: Normal range of motion  Neck supple  Cardiovascular: Regular rhythm, intact distal pulses and normal pulses  Tachycardia present  PMI is not displaced  No murmur heard  Pulmonary/Chest: Breath sounds normal  No accessory muscle usage or stridor  No tachypnea  She is in respiratory distress  Abdominal: Soft  Mild generalized abdominal tenderness to palpation without guarding, masses, rigidity, or rebound   Musculoskeletal: Normal range of motion  Neurological: She is alert and oriented to person, place, and time  Skin: Skin is warm and dry  Capillary refill takes less than 2 seconds  Psychiatric: She has a normal mood and affect         Vital Signs  ED Triage Vitals [12/29/19 1908]   Temperature Pulse Respirations Blood Pressure SpO2   100 1 °F (37 8 °C) (!) 109 18 150/83 97 %      Temp Source Heart Rate Source Patient Position - Orthostatic VS BP Location FiO2 (%)   Temporal Monitor Lying Right arm --      Pain Score       8           Vitals:    12/29/19 1908 12/29/19 1915 12/29/19 1930 12/29/19 2015   BP: 150/83 150/83  152/63   Pulse: (!) 109 (!) 108 105 98   Patient Position - Orthostatic VS: Lying            Visual Acuity  Visual Acuity      Most Recent Value   L Pupil Size (mm)  3   R Pupil Size (mm)  3          ED Medications  Medications   sodium chloride (PF) 0 9 % injection 3 mL (has no administration in time range)   sodium chloride 0 9 % bolus 1,000 mL (1,000 mL Intravenous New Bag 12/29/19 2002)   ondansetron (ZOFRAN) injection 4 mg (4 mg Intravenous Given 12/29/19 1956)   amoxicillin (AMOXIL) capsule 1,000 mg (1,000 mg Oral Given 12/29/19 1955)   fentanyl citrate (PF) 100 MCG/2ML 50 mcg (50 mcg Intravenous Given 12/29/19 1955)       Diagnostic Studies  Results Reviewed     Procedure Component Value Units Date/Time    Troponin I [[id number]]  (Normal) Collected:  12/29/19 2000    Lab Status:  Final result Specimen:  Blood from Arm, Right Updated:  12/29/19 2023     Troponin I <0 02 ng/mL     Basic metabolic panel [[id number]] Collected:  12/29/19 2000    Lab Status:  Final result Specimen:  Blood from Arm, Right Updated:  12/29/19 2015     Sodium 139 mmol/L      Potassium 4 1 mmol/L      Chloride 104 mmol/L      CO2 25 mmol/L      ANION GAP 10 mmol/L      BUN 7 mg/dL      Creatinine 0 74 mg/dL      Glucose 95 mg/dL      Calcium 9 2 mg/dL      eGFR 99 ml/min/1 73sq m     Narrative:       National Kidney Disease Foundation guidelines for Chronic Kidney Disease (CKD):     Stage 1 with normal or high GFR (GFR > 90 mL/min/1 73 square meters)    Stage 2 Mild CKD (GFR = 60-89 mL/min/1 73 square meters)    Stage 3A Moderate CKD (GFR = 45-59 mL/min/1 73 square meters)    Stage 3B Moderate CKD (GFR = 30-44 mL/min/1 73 square meters)    Stage 4 Severe CKD (GFR = 15-29 mL/min/1 73 square meters)    Stage 5 End Stage CKD (GFR <15 mL/min/1 73 square meters)  Note: GFR calculation is accurate only with a steady state creatinine    CBC and differential [[id number]] Collected:  12/29/19 2000    Lab Status:  Final result Specimen:  Blood from Arm, Right Updated:  12/29/19 2006     WBC 7 68 Thousand/uL      RBC 4 17 Million/uL      Hemoglobin 13 6 g/dL      Hematocrit 40 3 %      MCV 97 fL      MCH 32 6 pg      MCHC 33 7 g/dL      RDW 12 6 %      MPV 11 0 fL      Platelets 477 Thousands/uL      nRBC 0 /100 WBCs      Neutrophils Relative 59 %      Immat GRANS % 0 %      Lymphocytes Relative 28 %      Monocytes Relative 8 %      Eosinophils Relative 4 %      Basophils Relative 1 %      Neutrophils Absolute 4 53 Thousands/µL      Immature Grans Absolute 0 02 Thousand/uL      Lymphocytes Absolute 2 14 Thousands/µL      Monocytes Absolute 0 59 Thousand/µL      Eosinophils Absolute 0 30 Thousand/µL      Basophils Absolute 0 10 Thousands/µL                  X-ray chest 2 views   ED Interpretation by Jason Tee MD (12/29 2029)   No acute cardiopulmonary disease as interpreted by myself  Procedures  ECG 12 Lead Documentation Only  Date/Time: 12/29/2019 8:37 PM  Performed by: Berta Can PA-C  Authorized by:  Berta Can PA-C     Indications / Diagnosis:  Chest pain  ECG reviewed by me, the ED Provider: yes    Patient location:  ED  Previous ECG:     Previous ECG:  Compared to current    Similarity:  No change    Comparison to cardiac monitor: Yes    Interpretation:     Interpretation: normal    Rate:     ECG rate:  114    ECG rate assessment: tachycardic    Rhythm:     Rhythm: sinus tachycardia    Ectopy:     Ectopy: none               ED Course  ED Course as of Dec 29 2039   Sun Dec 29, 2019   1948 Blood Pressure: 150/83   1948 Temperature: 100 1 °F (37 8 °C)   1948 Pulse(!): 109   1948 Respirations: 18   1949 SpO2: 97 %   1949 Discussed case with Dr Tiffany Haider  Decided that benefits of amoxicillin to proceed and shoulder is lactic reaction due to the patient's reported rashes as an infant  We will closely monitor potential      2007 WBC: 7 68   2008 Hemoglobin: 13 6   2008 HCT: 40 3   2008 Platelet Count: 776   2921 Troponin I: <0 02   2023 WBC: 7 68   2024 Hemoglobin: 13 6   2024 HCT: 40 3   2024 Platelet Count: 947   2024 Sodium: 139   2024 Potassium: 4 1   2024 Chloride: 104   2024 Anion Gap: 10   2024 Creatinine: 0 74                               MDM  Number of Diagnoses or Management Options  Diagnosis management comments: Patient was seen 2 days ago and received extensive workup for chest pain  All findings were negative except some lung infiltrates found on non con CT of the chest   Patient was still exhibiting chest pain and EKG, troponins, CBC, CMP were obtained  The patient appears dehydrated and is considerably nauseous so IV fluids and Zofran were administered  Patient's pneumonia found on CT will be treated with amoxicillin and azithromycin  Patient previously reported rash from amoxicillin as an infant however patient tolerated dose of amoxicillin in the ED well  Patient has a history of chronic pain and due to multiple self reported drug allergies, tramadol is the only pain medication she states is safe and effective  She will be recommended to follow up with pain management within the next few days    Two days of tramadol will be ordered for the patient for as needed pain relief within the next few days  Disposition  Final diagnoses:   Pneumonia of right lung due to infectious organism, unspecified part of lung     Time reflects when diagnosis was documented in both MDM as applicable and the Disposition within this note     Time User Action Codes Description Comment    12/29/2019  8:29 PM Yumiko Dominguez Add [J18 9] Pneumonia of right lung due to infectious organism, unspecified part of lung       ED Disposition     ED Disposition Condition Date/Time Comment    Discharge Stable Sun Dec 29, 2019  8:30 PM Jt Shukla discharge to home/self care  Follow-up Information    None         Patient's Medications   Discharge Prescriptions    AMOXICILLIN (AMOXIL) 500 MG CAPSULE    Take 2 capsules (1,000 mg total) by mouth 3 (three) times a day for 7 days       Start Date: 12/29/2019End Date: 1/5/2020       Order Dose: 1,000 mg       Quantity: 42 capsule    Refills: 0    AZITHROMYCIN (ZITHROMAX) 250 MG TABLET    Take 2 tablets today then 1 tablet daily x 4 days       Start Date: 12/29/2019End Date: 1/2/2020       Order Dose: --       Quantity: 6 tablet    Refills: 0    TRAMADOL (ULTRAM) 50 MG TABLET    Take 1 tablet (50 mg total) by mouth every 6 (six) hours as needed for moderate pain for up to 2 days       Start Date: 12/29/2019End Date: 12/31/2019       Order Dose: 50 mg       Quantity: 8 tablet    Refills: 0     No discharge procedures on file      ED Provider  Electronically Signed by           Karolyn Dale PA-C  12/29/19 2039

## 2019-12-30 NOTE — DISCHARGE INSTRUCTIONS
Follow-up with family doctor as needed  Return to the emergency department if symptoms considerably worsened to do not resolve 5 days

## 2020-05-18 ENCOUNTER — OFFICE VISIT (OUTPATIENT)
Dept: URGENT CARE | Facility: CLINIC | Age: 45
End: 2020-05-18
Payer: COMMERCIAL

## 2020-05-18 VITALS
TEMPERATURE: 98.6 F | BODY MASS INDEX: 27.32 KG/M2 | HEART RATE: 88 BPM | DIASTOLIC BLOOD PRESSURE: 84 MMHG | RESPIRATION RATE: 20 BRPM | OXYGEN SATURATION: 98 % | SYSTOLIC BLOOD PRESSURE: 132 MMHG | WEIGHT: 164 LBS | HEIGHT: 65 IN

## 2020-05-18 DIAGNOSIS — L01.00 IMPETIGO: Primary | ICD-10-CM

## 2020-05-18 PROCEDURE — G0381 LEV 2 HOSP TYPE B ED VISIT: HCPCS | Performed by: NURSE PRACTITIONER

## 2020-10-27 ENCOUNTER — HOSPITAL ENCOUNTER (EMERGENCY)
Facility: HOSPITAL | Age: 45
Discharge: HOME | End: 2020-10-27
Payer: COMMERCIAL

## 2020-10-27 VITALS
OXYGEN SATURATION: 100 % | RESPIRATION RATE: 16 BRPM | HEART RATE: 120 BPM | TEMPERATURE: 98.5 F | SYSTOLIC BLOOD PRESSURE: 135 MMHG | DIASTOLIC BLOOD PRESSURE: 98 MMHG

## 2020-10-27 DIAGNOSIS — K02.9 DENTAL CARIES: Primary | ICD-10-CM

## 2020-10-27 PROCEDURE — 99284 EMERGENCY DEPT VISIT MOD MDM: CPT

## 2020-10-27 PROCEDURE — 99282 EMERGENCY DEPT VISIT SF MDM: CPT

## 2020-10-27 RX ORDER — CLINDAMYCIN HYDROCHLORIDE 150 MG/1
300 CAPSULE ORAL ONCE
Status: COMPLETED | OUTPATIENT
Start: 2020-10-27 | End: 2020-10-27

## 2020-10-27 RX ORDER — CLINDAMYCIN HYDROCHLORIDE 150 MG/1
150 CAPSULE ORAL EVERY 6 HOURS
Qty: 28 CAPSULE | Refills: 0 | Status: SHIPPED | OUTPATIENT
Start: 2020-10-27 | End: 2020-11-03

## 2020-10-27 RX ORDER — TRAMADOL HYDROCHLORIDE 50 MG/1
50 TABLET ORAL ONCE
Status: COMPLETED | OUTPATIENT
Start: 2020-10-27 | End: 2020-10-27

## 2020-10-27 RX ORDER — TRAMADOL HYDROCHLORIDE 50 MG/1
50 TABLET ORAL EVERY 6 HOURS PRN
Qty: 12 TABLET | Refills: 0 | Status: SHIPPED | OUTPATIENT
Start: 2020-10-27 | End: 2020-11-06

## 2020-10-27 RX ADMIN — TRAMADOL HYDROCHLORIDE 50 MG: 50 TABLET, FILM COATED ORAL at 20:32

## 2020-10-27 RX ADMIN — CLINDAMYCIN HYDROCHLORIDE 300 MG: 150 CAPSULE ORAL at 20:30

## 2021-10-10 ENCOUNTER — HOSPITAL ENCOUNTER (EMERGENCY)
Facility: HOSPITAL | Age: 46
Discharge: HOME | End: 2021-10-10
Attending: EMERGENCY MEDICINE | Admitting: EMERGENCY MEDICINE
Payer: COMMERCIAL

## 2021-10-10 VITALS
HEIGHT: 65 IN | DIASTOLIC BLOOD PRESSURE: 85 MMHG | HEART RATE: 96 BPM | OXYGEN SATURATION: 98 % | TEMPERATURE: 98.7 F | BODY MASS INDEX: 25.33 KG/M2 | WEIGHT: 152 LBS | SYSTOLIC BLOOD PRESSURE: 135 MMHG | RESPIRATION RATE: 16 BRPM

## 2021-10-10 DIAGNOSIS — J06.9 VIRAL URI WITH COUGH: Primary | ICD-10-CM

## 2021-10-10 PROCEDURE — 99284 EMERGENCY DEPT VISIT MOD MDM: CPT

## 2021-10-10 PROCEDURE — 99284 EMERGENCY DEPT VISIT MOD MDM: CPT | Performed by: EMERGENCY MEDICINE

## 2021-10-10 RX ORDER — PREDNISONE 20 MG/1
40 TABLET ORAL ONCE
Status: COMPLETED | OUTPATIENT
Start: 2021-10-10 | End: 2021-10-10

## 2021-10-10 RX ORDER — PREDNISONE 20 MG/1
40 TABLET ORAL DAILY
Qty: 8 TABLET | Refills: 0 | Status: SHIPPED | OUTPATIENT
Start: 2021-10-10 | End: 2021-10-14

## 2021-10-10 RX ADMIN — PREDNISONE 40 MG: 20 TABLET ORAL at 16:32

## 2022-01-10 ENCOUNTER — HOSPITAL ENCOUNTER (EMERGENCY)
Facility: HOSPITAL | Age: 47
Discharge: HOME | End: 2022-01-10
Attending: EMERGENCY MEDICINE

## 2022-01-10 ENCOUNTER — APPOINTMENT (EMERGENCY)
Dept: CT IMAGING | Facility: HOSPITAL | Age: 47
End: 2022-01-10

## 2022-01-10 VITALS
HEART RATE: 92 BPM | SYSTOLIC BLOOD PRESSURE: 122 MMHG | HEIGHT: 65 IN | BODY MASS INDEX: 25.33 KG/M2 | RESPIRATION RATE: 18 BRPM | TEMPERATURE: 100.1 F | OXYGEN SATURATION: 98 % | WEIGHT: 152 LBS | DIASTOLIC BLOOD PRESSURE: 79 MMHG

## 2022-01-10 DIAGNOSIS — U07.1 COVID-19: Primary | ICD-10-CM

## 2022-01-10 LAB
ANION GAP SERPL CALCULATED.3IONS-SCNC: 10 MMOL/L (ref 4–13)
BASOPHILS # BLD AUTO: 0.06 THOUSANDS/ΜL (ref 0–0.1)
BASOPHILS NFR BLD AUTO: 1 % (ref 0–1)
BUN SERPL-MCNC: 7 MG/DL (ref 5–25)
CALCIUM SERPL-MCNC: 9.5 MG/DL (ref 8.4–10.2)
CHLORIDE SERPL-SCNC: 98 MMOL/L (ref 96–108)
CO2 SERPL-SCNC: 26 MMOL/L (ref 21–32)
CREAT SERPL-MCNC: 0.84 MG/DL (ref 0.6–1.3)
EOSINOPHIL # BLD AUTO: 0.02 THOUSAND/ΜL (ref 0–0.61)
EOSINOPHIL NFR BLD AUTO: 0 % (ref 0–6)
ERYTHROCYTE [DISTWIDTH] IN BLOOD BY AUTOMATED COUNT: 12.7 % (ref 11.6–15.1)
FLUAV RNA RESP QL NAA+PROBE: NEGATIVE
FLUBV RNA RESP QL NAA+PROBE: NEGATIVE
GFR SERPL CREATININE-BSD FRML MDRD: 83 ML/MIN/1.73SQ M
GLUCOSE SERPL-MCNC: 93 MG/DL (ref 65–140)
HCT VFR BLD AUTO: 39.5 % (ref 34.8–46.1)
HGB BLD-MCNC: 13.2 G/DL (ref 11.5–15.4)
IMM GRANULOCYTES # BLD AUTO: 0.02 THOUSAND/UL (ref 0–0.2)
IMM GRANULOCYTES NFR BLD AUTO: 0 % (ref 0–2)
LYMPHOCYTES # BLD AUTO: 0.63 THOUSANDS/ΜL (ref 0.6–4.47)
LYMPHOCYTES NFR BLD AUTO: 9 % (ref 14–44)
MCH RBC QN AUTO: 31.1 PG (ref 26.8–34.3)
MCHC RBC AUTO-ENTMCNC: 33.4 G/DL (ref 31.4–37.4)
MCV RBC AUTO: 93 FL (ref 82–98)
MONOCYTES # BLD AUTO: 0.5 THOUSAND/ΜL (ref 0.17–1.22)
MONOCYTES NFR BLD AUTO: 7 % (ref 4–12)
NEUTROPHILS # BLD AUTO: 5.66 THOUSANDS/ΜL (ref 1.85–7.62)
NEUTS SEG NFR BLD AUTO: 83 % (ref 43–75)
NRBC BLD AUTO-RTO: 0 /100 WBCS
PLATELET # BLD AUTO: 232 THOUSANDS/UL (ref 149–390)
PMV BLD AUTO: 10.1 FL (ref 8.9–12.7)
POTASSIUM SERPL-SCNC: 3.6 MMOL/L (ref 3.5–5.3)
RBC # BLD AUTO: 4.24 MILLION/UL (ref 3.81–5.12)
RSV RNA RESP QL NAA+PROBE: NEGATIVE
SARS-COV-2 RNA RESP QL NAA+PROBE: POSITIVE
SODIUM SERPL-SCNC: 134 MMOL/L (ref 135–147)
WBC # BLD AUTO: 6.89 THOUSAND/UL (ref 4.31–10.16)

## 2022-01-10 PROCEDURE — 96374 THER/PROPH/DIAG INJ IV PUSH: CPT

## 2022-01-10 PROCEDURE — 74177 CT ABD & PELVIS W/CONTRAST: CPT

## 2022-01-10 PROCEDURE — 99284 EMERGENCY DEPT VISIT MOD MDM: CPT

## 2022-01-10 PROCEDURE — 99284 EMERGENCY DEPT VISIT MOD MDM: CPT | Performed by: EMERGENCY MEDICINE

## 2022-01-10 PROCEDURE — 0241U HB NFCT DS VIR RESP RNA 4 TRGT: CPT | Performed by: EMERGENCY MEDICINE

## 2022-01-10 PROCEDURE — G1004 CDSM NDSC: HCPCS

## 2022-01-10 PROCEDURE — 96361 HYDRATE IV INFUSION ADD-ON: CPT

## 2022-01-10 PROCEDURE — 36415 COLL VENOUS BLD VENIPUNCTURE: CPT | Performed by: EMERGENCY MEDICINE

## 2022-01-10 PROCEDURE — 85025 COMPLETE CBC W/AUTO DIFF WBC: CPT | Performed by: EMERGENCY MEDICINE

## 2022-01-10 PROCEDURE — 80048 BASIC METABOLIC PNL TOTAL CA: CPT | Performed by: EMERGENCY MEDICINE

## 2022-01-10 RX ORDER — FLUVOXAMINE MALEATE 100 MG
100 TABLET ORAL
Qty: 14 TABLET | Refills: 0 | Status: SHIPPED | OUTPATIENT
Start: 2022-01-10

## 2022-01-10 RX ORDER — ONDANSETRON 4 MG/1
4 TABLET, FILM COATED ORAL EVERY 6 HOURS
Qty: 12 TABLET | Refills: 0 | Status: SHIPPED | OUTPATIENT
Start: 2022-01-10

## 2022-01-10 RX ORDER — ONDANSETRON 2 MG/ML
4 INJECTION INTRAMUSCULAR; INTRAVENOUS ONCE
Status: COMPLETED | OUTPATIENT
Start: 2022-01-10 | End: 2022-01-10

## 2022-01-10 RX ORDER — ACETAMINOPHEN 325 MG/1
650 TABLET ORAL ONCE
Status: DISCONTINUED | OUTPATIENT
Start: 2022-01-10 | End: 2022-01-10 | Stop reason: HOSPADM

## 2022-01-10 RX ORDER — SODIUM CHLORIDE 9 MG/ML
3 INJECTION INTRAVENOUS
Status: DISCONTINUED | OUTPATIENT
Start: 2022-01-10 | End: 2022-01-10 | Stop reason: HOSPADM

## 2022-01-10 RX ADMIN — IOHEXOL 100 ML: 350 INJECTION, SOLUTION INTRAVENOUS at 19:53

## 2022-01-10 RX ADMIN — SODIUM CHLORIDE 1000 ML: 0.9 INJECTION, SOLUTION INTRAVENOUS at 19:21

## 2022-01-10 RX ADMIN — ONDANSETRON 4 MG: 2 INJECTION INTRAMUSCULAR; INTRAVENOUS at 21:27

## 2022-01-11 NOTE — DISCHARGE INSTRUCTIONS
Take Vitamin D 2000 IU daily, Vitamin C 1000mg twice daily and a multivitamin with zinc in it as it has been shown to help COVID symptoms  These can all be found over the counter  You were considered safe to be discharged with COVID however if you get increasingly short of breath or have any chest pain, please return to the emergency department  It may be helpful for you to get a pulse oximeter from a pharmacy  If your oxygen level is 92% or less a while at rest, please return to the ER  Anybody that you have been around especially without a mask recently should also quarantine and get tested if they have symptoms  You should plan to quarantine for 14 days  Please follow-up with family doctor and if your symptoms are considered mild, you may only need to quarantine for 10 days and if you are vaccinated, you may only need to quarantine for 7 days

## 2022-01-11 NOTE — ED PROVIDER NOTES
History  Chief Complaint   Patient presents with    Fever - 9 weeks to 74 years    Vomiting    Diarrhea     This is a 55year-old who complains of nausea, vomiting, diarrhea, runny nose, fevers, chills myalgias arthralgias  She states this feels similar to when she previously was diagnosed with Hodgkin's lymphoma  She reports that she has vaccinated gets covid in has no sick exposures  Patient reports she has not taken anything for it  She reports her urination has been normal although slightly decreased frequency  Bowel movements have been not black or bloody  Vomiting nonbloody nonbilious  No chest pain, no cough, no shortness of breath  Prior to Admission Medications   Prescriptions Last Dose Informant Patient Reported? Taking?   ibuprofen (MOTRIN) 800 mg tablet   No No   Sig: Take 1 tablet (800 mg total) by mouth every 6 (six) hours as needed (Pain)   Patient not taking: Reported on 2020      Facility-Administered Medications: None       Past Medical History:   Diagnosis Date    Back pain     [facility] [address] [facility]     [facility]gkin's lymphoma ([facility] [address] [facility]     Migraine     Unspecified ovarian cysts        Past Surgical History:   Procedure Laterality Date    APPENDECTOMY      CHOLECYSTECTOMY      HYSTERECTOMY      LYMPH NODE BIOPSY      left neck    MEDIPORT INSERTION, SINGLE      MEDIPORT REMOVAL      TUBAL LIGATION         Family History   Problem Relation Age of Onset    Heart disease Mother     Hypertension Mother     Asthma Mother     COPD Mother     Heart disease Father     Hypertension Father     Cancer Father      I have reviewed and agree with the history as documented      E-Cigarette/Vaping    E-Cigarette Use Current Every Day User      E-Cigarette/Vaping Substances     Social History     Tobacco Use    Smoking status: Former Smoker     Packs/day: 0 50     Types: Cigarettes     Quit date: 3/24/2019     Years since quittin 8    Smokeless tobacco: Never Used   Vaping Use    Vaping Use: Every day   Substance Use Topics    Alcohol use: No    Drug use: No       Review of Systems   Constitutional: Positive for activity change, chills, fatigue and fever  HENT: Positive for congestion  Eyes: Negative for visual disturbance  Respiratory: Negative for cough, chest tightness and shortness of breath  Cardiovascular: Negative for chest pain and palpitations  Gastrointestinal: Positive for abdominal pain, diarrhea, nausea and vomiting  Endocrine: Negative for polyuria  Genitourinary: Negative for dysuria  Musculoskeletal: Positive for arthralgias and myalgias  Skin: Negative for rash  Neurological: Positive for weakness  Negative for numbness  Physical Exam  Physical Exam  Constitutional:       General: She is not in acute distress  Appearance: Normal appearance  She is not ill-appearing, toxic-appearing or diaphoretic  HENT:      Head: Normocephalic and atraumatic  Right Ear: External ear normal       Left Ear: External ear normal    Eyes:      Extraocular Movements: Extraocular movements intact  Conjunctiva/sclera: Conjunctivae normal       Pupils: Pupils are equal, round, and reactive to light  Cardiovascular:      Comments: Good peripheral perfusion, good coloration  Pulmonary:      Effort: Pulmonary effort is normal    Abdominal:      General: There is no distension  Tenderness: There is no guarding  Musculoskeletal:         General: No swelling, deformity or signs of injury  Normal range of motion  Cervical back: Normal range of motion and neck supple  Skin:     General: Skin is warm  Findings: No bruising, lesion or rash  Neurological:      General: No focal deficit present  Mental Status: She is alert and oriented to person, place, and time  Gait: Gait normal    Psychiatric:         Mood and Affect: Mood normal          Behavior: Behavior normal          Thought Content:  Thought content normal  Judgment: Judgment normal          Vital Signs  ED Triage Vitals [01/10/22 1834]   Temperature Pulse Respirations Blood Pressure SpO2   100 1 °F (37 8 °C) (!) 128 22 127/62 97 %      Temp Source Heart Rate Source Patient Position - Orthostatic VS BP Location FiO2 (%)   Temporal Monitor Sitting Left arm --      Pain Score       8           Vitals:    01/10/22 1834   BP: 127/62   Pulse: (!) 128   Patient Position - Orthostatic VS: Sitting         Visual Acuity      ED Medications  Medications   sodium chloride (PF) 0 9 % injection 3 mL (has no administration in time range)   sodium chloride 0 9 % bolus 1,000 mL (1,000 mL Intravenous New Bag 1/10/22 1921)   ondansetron (ZOFRAN) injection 4 mg (has no administration in time range)   acetaminophen (TYLENOL) tablet 650 mg (has no administration in time range)   iohexol (OMNIPAQUE) 350 MG/ML injection (SINGLE-DOSE) 100 mL (100 mL Intravenous Given 1/10/22 1953)       Diagnostic Studies  Results Reviewed     Procedure Component Value Units Date/Time    COVID/FLU/RSV [[id number]]  (Abnormal) Collected: 01/10/22 1917    Lab Status: Final result Specimen: Nares from Nose Updated: 01/10/22 2043     SARS-CoV-2 Positive     INFLUENZA A PCR Negative     INFLUENZA B PCR Negative     RSV PCR Negative    Narrative:      FOR PEDIATRIC PATIENTS - copy/paste COVID Guidelines URL to browser: [email or web address]    SARS-CoV-2 assay is a Nucleic Acid Amplification assay intended for the  qualitative detection of nucleic acid from SARS-CoV-2 in nasopharyngeal  swabs  Results are for the presumptive identification of SARS-CoV-2 RNA  Positive results are indicative of infection with SARS-CoV-2, the virus  causing COVID-19, but do not rule out bacterial infection or co-infection  with other viruses   Laboratories within the [place] and its  territories are required to report all positive results to the appropriate  public health authorities  Negative results do not preclude SARS-CoV-2  infection and should not be used as the sole basis for treatment or other  patient management decisions  Negative results must be combined with  clinical observations, patient history, and epidemiological information  This test has not been FDA cleared or approved  This test has been authorized by FDA under an Emergency Use Authorization  (EUA)  This test is only authorized for the duration of time the  declaration that circumstances exist justifying the authorization of the  emergency use of an in vitro diagnostic tests for detection of SARS-CoV-2  virus and/or diagnosis of COVID-19 infection under section 564(b)(1) of  the Act, 21 U  S C  201NKY-9(C)(4), unless the authorization is terminated  or revoked sooner  The test has been validated but independent review by FDA  and CLIA is pending  Test performed using [employer] GeneXpert: This RT-PCR assay targets N2,  a region unique to SARS-CoV-2  A conserved region in the E-gene was chosen  for pan-Sarbecovirus detection which includes SARS-CoV-2      Basic metabolic panel [[id number]]  (Abnormal) Collected: 01/10/22 1917    Lab Status: Final result Specimen: Blood from Arm, Left Updated: 01/10/22 1946     Sodium 134 mmol/L      Potassium 3 6 mmol/L      Chloride 98 mmol/L      CO2 26 mmol/L      ANION GAP 10 mmol/L      BUN 7 mg/dL      Creatinine 0 84 mg/dL      Glucose 93 mg/dL      Calcium 9 5 mg/dL      eGFR 83 ml/min/1 73sq m     Narrative:      [place] guidelines for Chronic Kidney Disease (CKD):     Stage 1 with normal or high GFR (GFR > 90 mL/min/1 73 square meters)    Stage 2 Mild CKD (GFR = 60-89 mL/min/1 73 square meters)    Stage 3A Moderate CKD (GFR = 45-59 mL/min/1 73 square meters)    Stage 3B Moderate CKD (GFR = 30-44 mL/min/1 73 square meters)    Stage 4 Severe CKD (GFR = 15-29 mL/min/1 73 square meters)    Stage 5 End Stage CKD (GFR <15 mL/min/1 73 square meters)  Note: GFR calculation is accurate only with a steady state creatinine    CBC and differential [[id number]]  (Abnormal) Collected: 01/10/22 1917    Lab Status: Final result Specimen: Blood from Arm, Left Updated: 01/10/22 1925     WBC 6 89 Thousand/uL      RBC 4 24 Million/uL      Hemoglobin 13 2 g/dL      Hematocrit 39 5 %      MCV 93 fL      MCH 31 1 pg      MCHC 33 4 g/dL      RDW 12 7 %      MPV 10 1 fL      Platelets 102 Thousands/uL      nRBC 0 /100 WBCs      Neutrophils Relative 83 %      Immat GRANS % 0 %      Lymphocytes Relative 9 %      Monocytes Relative 7 %      Eosinophils Relative 0 %      Basophils Relative 1 %      Neutrophils Absolute 5 66 Thousands/µL      Immature Grans Absolute 0 02 Thousand/uL      Lymphocytes Absolute 0 63 Thousands/µL      Monocytes Absolute 0 50 Thousand/µL      Eosinophils Absolute 0 02 Thousand/µL      Basophils Absolute 0 06 Thousands/µL     UA w Reflex to Microscopic w Reflex to Culture [[id number]]     Lab Status: No result Specimen: Urine                  CT abdomen pelvis with contrast   Final Result by Srini Salazar MD (01/10 2044)      No evidence of bowel obstruction, colitis or diverticulitis  Workstation performed: [id number]                    Procedures  Procedures         ED Course                                             MDM  Number of Diagnoses or Management Options  COVID-19: new and requires workup  Diagnosis management comments: This is a 80-year-old female COVID-19  She appears clinically well and is improved with saline   Will disposition patient per current [facility] Guidelines:  Updated 2/3/21    Low risk patient SpO2 = 90% (rest/ambulation)  For High Risk patient and SpO2 >/= 92% (rest/ambulation)  Dispo: DC and call PCP within 24 hours    DC Meds:   Vitamin D3 2000 IU PO daily  Vit C 1g PO Q12  Multivitamin Daily   Consider Budesonide^ x14 days for patients age =   72 or age > 48 with comorbidities  o 180 mcg Flexhaler 4 puffs BID   o Nebulized 750mcg BID    Consider fluvoxamine 100 mg BID x7, UNLESS   contraindicated (see below):  o any psychiatric comorbidities  o use of any SSRI, SNRI, TCA, or MAOI  o use of concomitant CY substrates¶  o QTc interval > 500 msec    Home Pulse Ox:  Recommend returning if O2 drops below above levels    Low risk patient SpO2 = 90% (at rest) but < 90% (w/ ambulation)  High Risk patient* SpO2 = 92% (at rest) but <92% (w/ ambulation)  Dispo: Consider admission - if severe sx or to f/u closely with pcp   Consider DC - if mild sx    DC Meds:  Vit D3 2000 IU PO Daily  Vit C 1g PO Q12  Multivitamin Daily  Consider Budesonide^ x14 days for patients age =   72 or age > 48 with comorbidities  o 180 mcg Flexhaler 4 puffs BID   o Nebulized 750mcg BID    Consider fluvoxamine 100 mg BID x7, UNLESS   contraindicated (see below):  o any psychiatric comorbidities  o use of any SSRI, SNRI, TCA, or MAOI  o use of concomitant CY substrates¶  o QTc interval > 500 msec    Home Pulse Ox:  Recommend returning if O2 drops below above levels    Low risk patient SpO2 < 90%  High Risk patient* SpO2 < 92%  Dispo: Admit      **always call PCP in 24 hours if D/C  **High Risk Definition: age >71, BMI>35, immunocompromised, CKD or chronic heart/lung disease, pregnancy   Discussed warning signs and symptoms with the patient as well as when to return to the emergency department versus follow up with PC P  Patient states understanding and agreement with the plan  Patient care delayed due to critical capacity in the emergency department  This note was completed using dictation software            Amount and/or Complexity of Data Reviewed  Clinical lab tests: ordered and reviewed  Tests in the radiology section of CPT®: ordered and reviewed        Disposition  Final diagnoses:   COVID-19     Time reflects when diagnosis was documented in both MDM as applicable and the Disposition within this note     Time User Action Codes Description Comment    1/10/2022  8:56 PM Alton Cornell Add [U07 1] COVID-19       ED Disposition     ED Disposition Condition Date/Time Comment    Discharge Stable Mon Brad 10, 2022  8:56 PM Zechariah Nunez discharge to home/self care  Follow-up Information     Follow up With Specialties Details Why Contact Info    pcp    1-2 days          Patient's Medications   Discharge Prescriptions    FLUVOXAMINE (LUVOX) 100 MG TABLET    Take 1 tablet (100 mg total) by mouth daily at bedtime       Start Date: 1/10/2022 End Date: --       Order Dose: 100 mg       Quantity: 14 tablet    Refills: 0    ONDANSETRON (ZOFRAN) 4 MG TABLET    Take 1 tablet (4 mg total) by mouth every 6 (six) hours       Start Date: 1/10/2022 End Date: --       Order Dose: 4 mg       Quantity: 12 tablet    Refills: 0       No discharge procedures on file      PDMP Review     None          ED Provider  Electronically Signed by           Ann Juarez MD  01/10/22 8220

## 2023-10-30 ENCOUNTER — APPOINTMENT (EMERGENCY)
Dept: RADIOLOGY | Facility: HOSPITAL | Age: 48
End: 2023-10-30
Payer: COMMERCIAL

## 2023-10-30 ENCOUNTER — HOSPITAL ENCOUNTER (OUTPATIENT)
Facility: HOSPITAL | Age: 48
Setting detail: OBSERVATION
Discharge: HOME | End: 2023-10-31
Attending: EMERGENCY MEDICINE | Admitting: FAMILY MEDICINE
Payer: COMMERCIAL

## 2023-10-30 ENCOUNTER — APPOINTMENT (EMERGENCY)
Dept: CT IMAGING | Facility: HOSPITAL | Age: 48
End: 2023-10-30
Payer: COMMERCIAL

## 2023-10-30 DIAGNOSIS — S92.321A: ICD-10-CM

## 2023-10-30 DIAGNOSIS — S92.351A FRACTURE OF FIFTH METATARSAL BONE OF RIGHT FOOT: ICD-10-CM

## 2023-10-30 DIAGNOSIS — S92.331A: ICD-10-CM

## 2023-10-30 DIAGNOSIS — S92.313A FRACTURE OF 1ST METATARSAL: Primary | ICD-10-CM

## 2023-10-30 DIAGNOSIS — S92.341A: ICD-10-CM
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
## 2023-10-30 DIAGNOSIS — S93.324A LISFRANC DISLOCATION, RIGHT, INITIAL ENCOUNTER: ICD-10-CM

## 2023-10-30 PROBLEM — S92.901D CLOSED FRACTURE OF RIGHT FOOT WITH ROUTINE HEALING: Status: ACTIVE | Noted: 2023-10-30

## 2023-10-30 LAB
ALBUMIN SERPL BCP-MCNC: 4.3 G/DL (ref 3.5–5)
ALP SERPL-CCNC: 51 U/L (ref 34–104)
ALT SERPL W P-5'-P-CCNC: 14 U/L (ref 7–52)
AMPHETAMINES SERPL QL SCN: NEGATIVE
ANION GAP SERPL CALCULATED.3IONS-SCNC: 13 MMOL/L
APAP SERPL-MCNC: <10 UG/ML (ref 10–20)
APTT PPP: 19 SECONDS (ref 23–37)
AST SERPL W P-5'-P-CCNC: 15 U/L (ref 13–39)
ATRIAL RATE: 97 BPM
BARBITURATES UR QL: NEGATIVE
BASOPHILS # BLD AUTO: 0.06 THOUSANDS/ÂΜL (ref 0–0.1)
BASOPHILS NFR BLD AUTO: 1 % (ref 0–1)
BENZODIAZ UR QL: NEGATIVE
BILIRUB SERPL-MCNC: 0.59 MG/DL (ref 0.2–1)
BILIRUB UR QL STRIP: NEGATIVE
BUN SERPL-MCNC: 8 MG/DL (ref 5–25)
CALCIUM SERPL-MCNC: 9.4 MG/DL (ref 8.4–10.2)
CHLORIDE SERPL-SCNC: 106 MMOL/L (ref 96–108)
CLARITY UR: CLEAR
CO2 SERPL-SCNC: 23 MMOL/L (ref 21–32)
COCAINE UR QL: NEGATIVE
COLOR UR: YELLOW
CREAT SERPL-MCNC: 0.81 MG/DL (ref 0.6–1.3)
EOSINOPHIL # BLD AUTO: 0.05 THOUSAND/ÂΜL (ref 0–0.61)
EOSINOPHIL NFR BLD AUTO: 1 % (ref 0–6)
ERYTHROCYTE [DISTWIDTH] IN BLOOD BY AUTOMATED COUNT: 13.3 % (ref 11.6–15.1)
ETHANOL SERPL-MCNC: <10 MG/DL
GFR SERPL CREATININE-BSD FRML MDRD: 86 ML/MIN/1.73SQ M
GLUCOSE SERPL-MCNC: 118 MG/DL (ref 65–140)
GLUCOSE UR STRIP-MCNC: NEGATIVE MG/DL
HCT VFR BLD AUTO: 41.7 % (ref 34.8–46.1)
HGB BLD-MCNC: 14.1 G/DL (ref 11.5–15.4)
HGB UR QL STRIP.AUTO: NEGATIVE
IMM GRANULOCYTES # BLD AUTO: 0.02 THOUSAND/UL (ref 0–0.2)
IMM GRANULOCYTES NFR BLD AUTO: 0 % (ref 0–2)
INR PPP: 1.1 (ref 0.84–1.19)
KETONES UR STRIP-MCNC: NEGATIVE MG/DL
LEUKOCYTE ESTERASE UR QL STRIP: NEGATIVE
LYMPHOCYTES # BLD AUTO: 1.9 THOUSANDS/ÂΜL (ref 0.6–4.47)
LYMPHOCYTES NFR BLD AUTO: 22 % (ref 14–44)
MCH RBC QN AUTO: 31.3 PG (ref 26.8–34.3)
MCHC RBC AUTO-ENTMCNC: 33.8 G/DL (ref 31.4–37.4)
MCV RBC AUTO: 93 FL (ref 82–98)
METHADONE UR QL: NEGATIVE
MONOCYTES # BLD AUTO: 0.33 THOUSAND/ÂΜL (ref 0.17–1.22)
MONOCYTES NFR BLD AUTO: 4 % (ref 4–12)
NEUTROPHILS # BLD AUTO: 6.4 THOUSANDS/ÂΜL (ref 1.85–7.62)
NEUTS SEG NFR BLD AUTO: 72 % (ref 43–75)
NITRITE UR QL STRIP: NEGATIVE
NRBC BLD AUTO-RTO: 0 /100 WBCS
OPIATES UR QL SCN: NEGATIVE
OXYCODONE+OXYMORPHONE UR QL SCN: NEGATIVE
P AXIS: 27 DEGREES
PCP UR QL: NEGATIVE
PH UR STRIP.AUTO: 7 [PH]
PLATELET # BLD AUTO: 267 THOUSANDS/UL (ref 149–390)
PMV BLD AUTO: 11 FL (ref 8.9–12.7)
POTASSIUM SERPL-SCNC: 3.3 MMOL/L (ref 3.5–5.3)
PR INTERVAL: 134 MS
PROT SERPL-MCNC: 6.4 G/DL (ref 6.4–8.4)
PROT UR STRIP-MCNC: NEGATIVE MG/DL
PROTHROMBIN TIME: 14.1 SECONDS (ref 11.6–14.5)
QRS AXIS: 41 DEGREES
QRSD INTERVAL: 92 MS
QT INTERVAL: 402 MS
QTC INTERVAL: 510 MS
RBC # BLD AUTO: 4.5 MILLION/UL (ref 3.81–5.12)
SALICYLATES SERPL-MCNC: <5 MG/DL (ref 3–20)
SODIUM SERPL-SCNC: 142 MMOL/L (ref 135–147)
SP GR UR STRIP.AUTO: 1.01 (ref 1–1.03)
T WAVE AXIS: 41 DEGREES
THC UR QL: POSITIVE
UROBILINOGEN UR QL STRIP.AUTO: 0.2 E.U./DL
VENTRICULAR RATE: 97 BPM
WBC # BLD AUTO: 8.76 THOUSAND/UL (ref 4.31–10.16)

## 2023-10-30 PROCEDURE — 82077 ASSAY SPEC XCP UR&BREATH IA: CPT | Performed by: EMERGENCY MEDICINE

## 2023-10-30 PROCEDURE — 99285 EMERGENCY DEPT VISIT HI MDM: CPT | Performed by: EMERGENCY MEDICINE

## 2023-10-30 PROCEDURE — 80143 DRUG ASSAY ACETAMINOPHEN: CPT | Performed by: EMERGENCY MEDICINE

## 2023-10-30 PROCEDURE — 96376 TX/PRO/DX INJ SAME DRUG ADON: CPT

## 2023-10-30 PROCEDURE — 80053 COMPREHEN METABOLIC PANEL: CPT | Performed by: EMERGENCY MEDICINE

## 2023-10-30 PROCEDURE — 73630 X-RAY EXAM OF FOOT: CPT

## 2023-10-30 PROCEDURE — 80179 DRUG ASSAY SALICYLATE: CPT | Performed by: EMERGENCY MEDICINE

## 2023-10-30 PROCEDURE — 99152 MOD SED SAME PHYS/QHP 5/>YRS: CPT | Performed by: EMERGENCY MEDICINE

## 2023-10-30 PROCEDURE — 80307 DRUG TEST PRSMV CHEM ANLYZR: CPT | Performed by: FAMILY MEDICINE

## 2023-10-30 PROCEDURE — 28475 CLTX METATARSAL FX W/MNPJ EA: CPT | Performed by: EMERGENCY MEDICINE

## 2023-10-30 PROCEDURE — G1004 CDSM NDSC: HCPCS

## 2023-10-30 PROCEDURE — 99221 1ST HOSP IP/OBS SF/LOW 40: CPT | Performed by: FAMILY MEDICINE

## 2023-10-30 PROCEDURE — 73610 X-RAY EXAM OF ANKLE: CPT

## 2023-10-30 PROCEDURE — 85025 COMPLETE CBC W/AUTO DIFF WBC: CPT | Performed by: EMERGENCY MEDICINE

## 2023-10-30 PROCEDURE — 85730 THROMBOPLASTIN TIME PARTIAL: CPT | Performed by: EMERGENCY MEDICINE

## 2023-10-30 PROCEDURE — 73700 CT LOWER EXTREMITY W/O DYE: CPT

## 2023-10-30 PROCEDURE — 99284 EMERGENCY DEPT VISIT MOD MDM: CPT

## 2023-10-30 PROCEDURE — 81003 URINALYSIS AUTO W/O SCOPE: CPT | Performed by: FAMILY MEDICINE

## 2023-10-30 PROCEDURE — 73620 X-RAY EXAM OF FOOT: CPT

## 2023-10-30 PROCEDURE — 85610 PROTHROMBIN TIME: CPT | Performed by: EMERGENCY MEDICINE

## 2023-10-30 PROCEDURE — 36415 COLL VENOUS BLD VENIPUNCTURE: CPT | Performed by: EMERGENCY MEDICINE

## 2023-10-30 PROCEDURE — 82306 VITAMIN D 25 HYDROXY: CPT | Performed by: EMERGENCY MEDICINE

## 2023-10-30 PROCEDURE — 93010 ELECTROCARDIOGRAM REPORT: CPT | Performed by: INTERNAL MEDICINE

## 2023-10-30 PROCEDURE — 96374 THER/PROPH/DIAG INJ IV PUSH: CPT

## 2023-10-30 PROCEDURE — 93005 ELECTROCARDIOGRAM TRACING: CPT

## 2023-10-30 RX ORDER — FENTANYL CITRATE 50 UG/ML
50 INJECTION, SOLUTION INTRAMUSCULAR; INTRAVENOUS ONCE
Status: COMPLETED | OUTPATIENT
Start: 2023-10-30 | End: 2023-10-30

## 2023-10-30 RX ORDER — FENTANYL CITRATE 50 UG/ML
50 INJECTION, SOLUTION INTRAMUSCULAR; INTRAVENOUS ONCE
Status: DISCONTINUED | OUTPATIENT
Start: 2023-10-30 | End: 2023-10-30

## 2023-10-30 RX ORDER — ENOXAPARIN SODIUM 100 MG/ML
40 INJECTION SUBCUTANEOUS DAILY
Status: DISCONTINUED | OUTPATIENT
Start: 2023-10-31 | End: 2023-10-31 | Stop reason: HOSPADM

## 2023-10-30 RX ORDER — ACETAMINOPHEN 325 MG/1
975 TABLET ORAL EVERY 8 HOURS SCHEDULED
Status: DISCONTINUED | OUTPATIENT
Start: 2023-10-30 | End: 2023-10-31 | Stop reason: HOSPADM

## 2023-10-30 RX ORDER — FENTANYL CITRATE 50 UG/ML
INJECTION, SOLUTION INTRAMUSCULAR; INTRAVENOUS
Status: COMPLETED
Start: 2023-10-30 | End: 2023-10-30

## 2023-10-30 RX ORDER — FENTANYL CITRATE 50 UG/ML
50 INJECTION, SOLUTION INTRAMUSCULAR; INTRAVENOUS EVERY 4 HOURS PRN
Status: DISCONTINUED | OUTPATIENT
Start: 2023-10-30 | End: 2023-10-31 | Stop reason: HOSPADM

## 2023-10-30 RX ORDER — PROPOFOL 10 MG/ML
100 INJECTION, EMULSION INTRAVENOUS ONCE
Status: COMPLETED | OUTPATIENT
Start: 2023-10-30 | End: 2023-10-30

## 2023-10-30 RX ORDER — OXYCODONE HYDROCHLORIDE 5 MG/1
5 TABLET ORAL EVERY 4 HOURS PRN
Status: DISCONTINUED | OUTPATIENT
Start: 2023-10-30 | End: 2023-10-31 | Stop reason: HOSPADM

## 2023-10-30 RX ORDER — OXYCODONE HYDROCHLORIDE AND ACETAMINOPHEN 5; 325 MG/1; MG/1
1 TABLET ORAL EVERY 4 HOURS PRN
Qty: 30 TABLET | Refills: 0 | Status: CANCELLED | OUTPATIENT
Start: 2023-10-30

## 2023-10-30 RX ADMIN — FENTANYL CITRATE 50 MCG: 50 INJECTION INTRAMUSCULAR; INTRAVENOUS at 14:04

## 2023-10-30 RX ADMIN — FENTANYL CITRATE 50 MCG: 50 INJECTION INTRAMUSCULAR; INTRAVENOUS at 15:13

## 2023-10-30 RX ADMIN — PROPOFOL 100 MG: 10 INJECTION, EMULSION INTRAVENOUS at 15:39

## 2023-10-30 RX ADMIN — FENTANYL CITRATE 50 MCG: 50 INJECTION INTRAMUSCULAR; INTRAVENOUS at 23:10

## 2023-10-30 RX ADMIN — ACETAMINOPHEN 975 MG: 325 TABLET, FILM COATED ORAL at 21:15

## 2023-10-30 RX ADMIN — FENTANYL CITRATE 50 MCG: 50 INJECTION, SOLUTION INTRAMUSCULAR; INTRAVENOUS at 14:04

## 2023-10-30 RX ADMIN — FENTANYL CITRATE 50 MCG: 50 INJECTION INTRAMUSCULAR; INTRAVENOUS at 19:12

## 2023-10-30 RX ADMIN — PROPOFOL 100 MG: 10 INJECTION, EMULSION INTRAVENOUS at 15:31

## 2023-10-30 RX ADMIN — FENTANYL CITRATE 50 MCG: 0.05 INJECTION, SOLUTION INTRAMUSCULAR; INTRAVENOUS at 16:20

## 2023-10-30 NOTE — RESPIRATORY THERAPY NOTE
Conscience sedation performed. Patient on 3 lpm end tidal nasal cannula. Suction set up and not needed. Patient did start to have snoring and her oxygen saturations started to drop, I used 100% ambu bag mask the patient for a little while. Patient now on 1 lpm, she is able to cough and clear her airways. Nurse okay with me leaving at this time.  Patients

## 2023-10-30 NOTE — ASSESSMENT & PLAN NOTE
Patient fell at night when attempting to go to bathroom, case discussed with Podiatry who recommended discharge home with outpatient follow up however pain is not controlled  Admit to medicine  PT / OT eval  Podiatry consult  Scheduled tylenol q8 hours  PRN oxycodone 5mg Q4  (tolerated in past)  PRN Fentanyl 50mcg q4 hours

## 2023-10-30 NOTE — TRAUMA DOCUMENTATION
Late note d/t pt care. Pt found to be hypotensive with SBP of 60's and 70's. IVF bolus initiated. Bed placed in trendelenburg. Provider made aware.

## 2023-10-30 NOTE — PLAN OF CARE
Problem: PAIN - ADULT  Goal: Verbalizes/displays adequate comfort level or baseline comfort level  Description: Interventions:  - Encourage patient to monitor pain and request assistance  - Assess pain using appropriate pain scale  - Administer analgesics based on type and severity of pain and evaluate response  - Implement non-pharmacological measures as appropriate and evaluate response  - Consider cultural and social influences on pain and pain management  - Notify physician/advanced practitioner if interventions unsuccessful or patient reports new pain  Outcome: Progressing     Problem: INFECTION - ADULT  Goal: Absence or prevention of progression during hospitalization  Description: INTERVENTIONS:  - Assess and monitor for signs and symptoms of infection  - Monitor lab/diagnostic results  - Monitor all insertion sites, i.e. indwelling lines, tubes, and drains  - Monitor endotracheal if appropriate and nasal secretions for changes in amount and color  - [place] appropriate cooling/warming therapies per order  - Administer medications as ordered  - Instruct and encourage patient and family to use good hand hygiene technique  - Identify and instruct in appropriate isolation precautions for identified infection/condition  Outcome: Progressing     Problem: SAFETY ADULT  Goal: Patient will remain free of falls  Description: INTERVENTIONS:  - Educate patient/family on patient safety including physical limitations  - Instruct patient to call for assistance with activity   - Consult OT/PT to assist with strengthening/mobility   - Keep Call bell within reach  - Keep bed low and locked with side rails adjusted as appropriate  - Keep care items and personal belongings within reach  - Initiate and maintain comfort rounds  - Make Fall Risk Sign visible to staff  - Offer Toileting every 2 Hours, in advance of need  - Initiate/Maintain bed/chair alarm  - Obtain necessary fall risk management equipment: call bell within reach, comfort rounds  - Apply yellow socks and bracelet for high fall risk patients  - Consider moving patient to room near nurses station  Outcome: Progressing  Goal: Maintain or return to baseline ADL function  Description: INTERVENTIONS:  -  Assess patient's ability to carry out ADLs; assess patient's baseline for ADL function and identify physical deficits which impact ability to perform ADLs (bathing, care of mouth/teeth, toileting, grooming, dressing, etc.)  - Assess/evaluate cause of self-care deficits   - Assess range of motion  - Assess patient's mobility; develop plan if impaired  - Assess patient's need for assistive devices and provide as appropriate  - Encourage maximum independence but intervene and supervise when necessary  - Involve family in performance of ADLs  - Assess for home care needs following discharge   - Consider OT consult to assist with ADL evaluation and planning for discharge  - Provide patient education as appropriate  Outcome: Progressing  Goal: Maintains/Returns to pre admission functional level  Description: INTERVENTIONS:  - Perform BMAT or MOVE assessment daily.   - Set and communicate daily mobility goal to care team and patient/family/caregiver. - Collaborate with rehabilitation services on mobility goals if consulted  - Perform Range of Motion 2 times a day. - Reposition patient every 2 hours.   - Dangle patient 2 times a day  - Stand patient 2 times a day  - Ambulate patient 2 times a day  - Out of bed to chair 2 times a day   - Out of bed for meals 2 times a day  - Out of bed for toileting  - Record patient progress and toleration of activity level   Outcome: Progressing     Problem: DISCHARGE PLANNING  Goal: Discharge to home or other facility with appropriate resources  Description: INTERVENTIONS:  - Identify barriers to discharge w/patient and caregiver  - Arrange for needed discharge resources and transportation as appropriate  - Identify discharge learning needs (meds, wound care, etc.)  - Arrange for interpretive services to assist at discharge as needed  - Refer to Case Management Department for coordinating discharge planning if the patient needs post-hospital services based on physician/advanced practitioner order or complex needs related to functional status, cognitive ability, or social support system  Outcome: Progressing     Problem: Knowledge Deficit  Goal: Patient/family/caregiver demonstrates understanding of disease process, treatment plan, medications, and discharge instructions  Description: Complete learning assessment and assess knowledge base.   Interventions:  - Provide teaching at level of understanding  - Provide teaching via preferred learning methods  Outcome: Progressing

## 2023-10-30 NOTE — H&P
H&P Exam - Norma Antonio 50 y.o. female MRN: [id number]    Unit/Bed#: TR15 Encounter: [id number]    Assessment:    * Closed fracture of right foot with routine healing  Assessment & Plan  Patient fell at night when attempting to go to bathroom, case discussed with Podiatry who recommended discharge home with outpatient follow up however pain is not controlled  Admit to medicine  PT / OT eval  Podiatry consult  Scheduled tylenol q8 hours  PRN oxycodone 5mg Q4  (tolerated in past)  PRN Fentanyl 50mcg q4 hours            History of Present Illness     50year-old otherwise healthy female presents the emergency room after sustaining a fall and developing right ankle pain. Patient states she got up at approximately 1 AM to use the bathroom, and thinks she fell and heard a pop. She was unable to get up due to pain and able to get to the hallway to [facility] and her neighbors. The case was reviewed with on-call podiatry who recommended discharge home, however patient continues to have intractable pain and was recommended for observation in order to gain better control of pain. Review of Systems   Musculoskeletal:  Positive for arthralgias, gait problem and joint swelling. All other systems reviewed and are negative.       Historical Information   Past Medical History:   Diagnosis Date    Back pain     Hodgkin's disease ([address])     Hodgkin's lymphoma ([address])     Migraine     Unspecified ovarian cysts      Past Surgical History:   Procedure Laterality Date    APPENDECTOMY      CHOLECYSTECTOMY      HYSTERECTOMY      LYMPH NODE BIOPSY      left neck    MEDIPORT INSERTION, SINGLE      MEDIPORT REMOVAL      TUBAL LIGATION       Social History   Social History     Substance and Sexual Activity   Alcohol Use No     Social History     Substance and Sexual Activity   Drug Use No     Social History     Tobacco Use   Smoking Status Former    Packs/day: 0.50    Types: Cigarettes    Quit date: 3/24/2019    Years since quitting: 4.6   Smokeless Tobacco Never     E-Cigarette Use: Current Every Day User     E-Cigarette/Vaping Substances       Family History: non-contributory    Meds/Allergies   all medications and allergies reviewed  Allergies   Allergen Reactions    Toradol [Ketorolac Tromethamine] Shortness Of Breath    Cephalosporins      RASH AND SOB    Codeine     Dilaudid  [Hydromorphone Hcl]      Other reaction(s): Hallucinations    Dilaudid [Hydromorphone Hcl]      Hallucinations    Fentanyl      DURAGESIS PATCH. RASH  AND EDEMA  DURAGESIS PATCH. RASH  AND EDEMA    Morphine      Other reaction(s): Hallucinations    Morphine And Related      Hallucinations    Phenergan [Promethazine Hcl]      Other reaction(s): Hallucinations  hallucinations    Reglan [Metoclopramide]      Other reaction(s): Hallucinations  Hallucinations    Codeine Sulfate Rash    Penicillins Other (See Comments)     Happened when infant doesn't know reaction       Objective   First Vitals:   Blood Pressure: 107/68 (10/30/23 1400)  Pulse: 96 (Simultaneous filing. User may not have seen previous data.) (10/30/23 1400)  Temperature: 98 °F (36.7 °C) (10/30/23 1400)  Respirations: 16 (10/30/23 1400)  Weight - Scale: 75.4 kg (166 lb 3.6 oz) (10/30/23 1359)  SpO2: 99 % (10/30/23 1400)    Current Vitals:   Blood Pressure: 133/77 (10/30/23 1700)  Pulse: 92 (10/30/23 1700)  Temperature: 98 °F (36.7 °C) (10/30/23 1400)  Respirations: 18 (10/30/23 1700)  Weight - Scale: 75.4 kg (166 lb 3.6 oz) (10/30/23 1359)  SpO2: 99 % (10/30/23 1700)      Intake/Output Summary (Last 24 hours) at 10/30/2023 1732  Last data filed at 10/30/2023 1530  Gross per 24 hour   Intake 0 ml   Output --   Net 0 ml       Invasive Devices       Peripheral Intravenous Line  Duration             Peripheral IV 10/30/23 Left Antecubital <1 day                    Physical Exam  Constitutional:       General: She is not in acute distress. Appearance: Normal appearance. She is not toxic-appearing.    HENT: Head: Normocephalic and atraumatic. Right Ear: External ear normal.      Left Ear: External ear normal.      Nose: Nose normal. No congestion. Mouth/Throat:      Mouth: Mucous membranes are dry. Cardiovascular:      Rate and Rhythm: Normal rate and regular rhythm. Pulses: Normal pulses. Comments: DP and PT pulses intact    Musculoskeletal:         General: Tenderness present. Right lower leg: Edema present. Skin:     General: Skin is warm and dry. Capillary Refill: Capillary refill takes 2 to 3 seconds. Neurological:      Mental Status: She is alert and oriented to person, place, and time. Comments: Sensation intact in lower extremities         Lab Results:   Lab Results   Component Value Date    WBC 8.76 10/30/2023    HGB 14.1 10/30/2023    HCT 41.7 10/30/2023    MCV 93 10/30/2023     10/30/2023     Lab Results   Component Value Date     01/05/2016    SODIUM 142 10/30/2023    K 3.3 (L) 10/30/2023     10/30/2023    CO2 23 10/30/2023    ANIONGAP 10 01/05/2016    AGAP 13 10/30/2023    BUN 8 10/30/2023    CREATININE 0.81 10/30/2023    GLUC 118 10/30/2023    CALCIUM 9.4 10/30/2023    AST 15 10/30/2023    ALT 14 10/30/2023    ALKPHOS 51 10/30/2023    PROT 6.7 01/05/2016    TP 6.4 10/30/2023    BILITOT 0.27 01/05/2016    TBILI 0.59 10/30/2023    EGFR 86 10/30/2023       Imaging:   XR foot 3+ views RIGHT   Final Result      Divergent Lisfranc injury as detailed above. Other complex foot fractures as described above. I have personally reviewed this study including all images.  / R.J.F. Fractures of the midfoot were noted on the initial emergency medicine note. Resident: Yaquelin Mckenzie, the attending radiologist, have reviewed the images and agree with the final report above. Workstation performed: [id number]         XR ankle 3+ views RIGHT   Final Result      Divergent Lisfranc injury as detailed above.       Other complex foot fractures as described above. I have personally reviewed this study including all images.  / R.J.F. Fractures of the midfoot were noted on the initial emergency medicine note. Resident: Junior Giordano, the attending radiologist, have reviewed the images and agree with the final report above.       Workstation performed: [id number]         XR foot 2 views RIGHT    (Results Pending)   CT lower extremity wo contrast right    (Results Pending)       EKG, Pathology, and Other Studies: NSR    Code Status: No Order  Advance Directive and Living Will:      Power of :    POLST:      Counseling / Coordination of Care:

## 2023-10-30 NOTE — ED PROVIDER NOTES
Emergency Department Trauma Note  Lorence Dancer 50 y.o. female MRN: [id number]  Unit/Bed#: /421-01 Encounter: [id number]      Trauma Alert: Trauma Acuity: Trauma Evaluation  Model of Arrival: Mode of Arrival: ALS via Trauma Squad Name and Number: ERMELINDA  Trauma Team: Current Providers  Attending Provider: Jocelin Queen DO  Attending Provider: Kaitlin Staley MD  Registered Nurse: Attila Munoz, RN  Patient Care Assistant: Leandro Lanier  Consulting Physician: Tamy Pringle DPM  Registered Nurse: Joy Hilliard RN  Registered Nurse: Nabil Hartman, CISCO  Nurse Orientee: Keo Ramirez RN  Respiratory Therapist: Uzair Salgado, RT  Patient Care Assistant: [address]  Patient Care Assistant: Franky Ramey  Registered Nurse: Desire Frost RN  : TOBIAS Blum  Medical Student: Steph Weems  Unit Clerk: Chel Chacon RN Care Manager: Romy Espinoza RN  Consultants:     None      History of Present Illness     Chief Complaint:   Chief Complaint   Patient presents with    Trauma     Fall, R foot deformity. Unknown LOC, -thinners, denies any additional trauma. HPI:  Lorence Dancer is a 50 y.o. female who presents with right foot pain after fall. Mechanism:Details of Incident: Fall Injury Date: 10/30/23 Injury Time: 1316 Injury Occurence Location - [address] [place] [address]: [place]    Patient is a 42-year-old female who states she was walking to the bathroom at her residence when she states she stepped wrong and felt a pop in her right foot. She states she then crawled up to the hallway and attempt to get the attention of her neighbors to call EMS. Neighbor contacted patient and activated EMS. EMS states patient had normal blood pressure with systolic in the 487N however as they went to set her up, her blood pressure dropped to 90 systolic. This was remedied after the liter back flat. No further episodes of hypotension. Patient denies any other injuries.   No previous injury to this foot. Denies any ankle pain. Pain located to the midfoot. Denies alcohol or drug abuse, other than occasional marijuana. Smokes cigarettes. Patient is here with her fiancé. Review of Systems   Constitutional:  Negative for appetite change, chills, fatigue, fever and unexpected weight change. HENT:  Negative for congestion, ear pain, rhinorrhea and sore throat. Eyes:  Negative for pain and visual disturbance. Respiratory:  Negative for cough, chest tightness, shortness of breath and wheezing. Cardiovascular:  Negative for chest pain, palpitations and leg swelling. Gastrointestinal:  Negative for abdominal pain, constipation, diarrhea, nausea and vomiting. Genitourinary:  Negative for difficulty urinating, dysuria, frequency, hematuria, menstrual problem, pelvic pain, vaginal bleeding and vaginal discharge. Musculoskeletal:  Negative for arthralgias, back pain and neck pain. Skin:  Negative for color change and rash. Neurological:  Negative for dizziness, seizures, syncope, light-headedness and headaches. Psychiatric/Behavioral:  Negative for sleep disturbance. All other systems reviewed and are negative. Historical Information     Immunizations: There is no immunization history for the selected administration types on file for this patient.     Past Medical History:   Diagnosis Date    Back pain     Hodgkin's disease ([address])     Hodgkin's lymphoma ([address])     Migraine     Unspecified ovarian cysts        Family History   Problem Relation Age of Onset    Heart disease Mother     Hypertension Mother     Asthma Mother     COPD Mother     Heart disease Father     Hypertension Father     Cancer Father      Past Surgical History:   Procedure Laterality Date    APPENDECTOMY      CHOLECYSTECTOMY      HYSTERECTOMY      LYMPH NODE BIOPSY      left neck    MEDIPORT INSERTION, SINGLE      MEDIPORT REMOVAL      TUBAL LIGATION       Social History     Tobacco Use Smoking status: Former     Packs/day: 0.50     Types: Cigarettes     Quit date: 3/24/2019     Years since quittin.6    Smokeless tobacco: Never   Vaping Use    Vaping Use: Former    Substances: THC   Substance Use Topics    Alcohol use: Not Currently    Drug use: Yes     Types: Marijuana     E-Cigarette/Vaping    E-Cigarette Use Former User      E-Cigarette/Vaping Substances    Nicotine No     THC Yes     CBD No     Flavoring No     Other No     Unknown No        Family History: non-contributory    Meds/Allergies   Prior to Admission Medications   Prescriptions Last Dose Informant Patient Reported? Taking? fluvoxaMINE (LUVOX) 100 mg tablet Not Taking  No No   Sig: Take 1 tablet (100 mg total) by mouth daily at bedtime   Patient not taking: Reported on 10/30/2023   ibuprofen (MOTRIN) 800 mg tablet Not Taking  No No   Sig: Take 1 tablet (800 mg total) by mouth every 6 (six) hours as needed (Pain)   Patient not taking: Reported on 2020   ondansetron (ZOFRAN) 4 mg tablet Not Taking  No No   Sig: Take 1 tablet (4 mg total) by mouth every 6 (six) hours   Patient not taking: Reported on 10/30/2023      Facility-Administered Medications: None       Allergies   Allergen Reactions    Toradol [Ketorolac Tromethamine] Shortness Of Breath    Cephalosporins      RASH AND SOB    Codeine     Dilaudid  [Hydromorphone Hcl]      Other reaction(s): Hallucinations    Dilaudid [Hydromorphone Hcl]      Hallucinations    Fentanyl      DURAGESIS PATCH. RASH  AND EDEMA  DURAGESIS PATCH. RASH  AND EDEMA    Morphine      Other reaction(s): Hallucinations    Morphine And Related      Hallucinations    Phenergan [Promethazine Hcl]      Other reaction(s): Hallucinations  hallucinations    Reglan [Metoclopramide]      Other reaction(s): Hallucinations  Hallucinations    Codeine Sulfate Rash    Penicillins Other (See Comments)     Happened when infant doesn't know reaction       PHYSICAL EXAM    PE limited by: None  none  Objective Vitals:   First set: Temperature: 98 °F (36.7 °C) (10/30/23 1400)  Pulse: 96 (Simultaneous filing. User may not have seen previous data.) (10/30/23 1400)  Respirations: 16 (10/30/23 1400)  Blood Pressure: 107/68 (10/30/23 1400)  SpO2: 99 % (10/30/23 1400)    Primary Survey:   (A) Airway: inmtact  (B) Breathing: spontaneous and non labored  (C) Circulation: Pulses:   normal  (D) Disabliity:  GCS Total:  15  (E) Expose:  Completed    Secondary Survey: (Click on Physical Exam tab above)  Physical Exam  Vitals and nursing note reviewed. Constitutional:       General: She is not in acute distress. Appearance: Normal appearance. She is well-developed and normal weight. She is not ill-appearing, toxic-appearing or diaphoretic. HENT:      Head: Normocephalic and atraumatic. Nose: Nose normal.      Mouth/Throat:      Mouth: Mucous membranes are moist.      Pharynx: Oropharynx is clear. Eyes:      General: No scleral icterus. Extraocular Movements: Extraocular movements intact. Conjunctiva/sclera: Conjunctivae normal.   Cardiovascular:      Rate and Rhythm: Normal rate and regular rhythm. Pulses: Normal pulses. Heart sounds: Normal heart sounds. No murmur heard. No friction rub. No gallop. Pulmonary:      Effort: Pulmonary effort is normal. No respiratory distress. Breath sounds: Normal breath sounds. No wheezing or rales. Abdominal:      Palpations: Abdomen is soft. There is no mass. Tenderness: There is no abdominal tenderness. There is no right CVA tenderness, left CVA tenderness, guarding or rebound. Hernia: No hernia is present. Musculoskeletal:         General: Swelling, tenderness, deformity and signs of injury present. Normal range of motion. Cervical back: Normal range of motion and neck supple. No rigidity or tenderness. Right lower leg: No edema. Left lower leg: No edema. Right foot: Normal capillary refill.  Swelling, deformity, tenderness and bony tenderness present. No laceration. Normal pulse. Left foot: Normal.        Legs:    Lymphadenopathy:      Cervical: No cervical adenopathy. Skin:     General: Skin is warm and dry. Capillary Refill: Capillary refill takes less than 2 seconds. Coloration: Skin is not jaundiced or pale. Findings: No lesion or rash. Neurological:      General: No focal deficit present. Mental Status: She is alert and oriented to person, place, and time. Psychiatric:         Mood and Affect: Mood normal.         Behavior: Behavior normal.         Cervical spine cleared by clinical criteria? Yes     Invasive Devices       Peripheral Intravenous Line  Duration             Peripheral IV 10/30/23 Left Antecubital 1 day                    Lab Results:   Results Reviewed       Procedure Component Value Units Date/Time    Rapid drug screen, urine [[id number]]  (Abnormal) Collected: 10/30/23 2234    Lab Status: Final result Specimen: Urine, Clean Catch Updated: 10/30/23 2255     Amph/Meth UR Negative     Barbiturate Ur Negative     Benzodiazepine Urine Negative     Cocaine Urine Negative     Methadone Urine Negative     Opiate Urine Negative     PCP Ur Negative     THC Urine Positive     Oxycodone Urine Negative    Narrative:      Presumptive report. If requested, specimen will be sent to reference lab for confirmation. FOR MEDICAL PURPOSES ONLY. IF CONFIRMATION NEEDED PLEASE CONTACT THE LAB WITHIN 5 DAYS.     Drug Screen Cutoff Levels:  AMPHETAMINE/METHAMPHETAMINES  1000 ng/mL  BARBITURATES     200 ng/mL  BENZODIAZEPINES     200 ng/mL  COCAINE      300 ng/mL  METHADONE      300 ng/mL  OPIATES      300 ng/mL  PHENCYCLIDINE     25 ng/mL  THC       50 ng/mL  OXYCODONE      100 ng/mL    UA w Reflex to Microscopic w Reflex to Culture [[id number]] Collected: 10/30/23 2233    Lab Status: Final result Specimen: Urine, Clean Catch Updated: 10/30/23 2245     Color, UA Yellow     Clarity, UA Clear Specific Gravity, UA 1.015     pH, UA 7.0     Leukocytes, UA Negative     Nitrite, UA Negative     Protein, UA Negative mg/dl      Glucose, UA Negative mg/dl      Ketones, UA Negative mg/dl      Urobilinogen, UA 0.2 E.U./dl      Bilirubin, UA Negative     Occult Blood, UA Negative    Protime-INR [[id number]]  (Normal) Collected: 10/30/23 1457    Lab Status: Final result Specimen: Blood from Arm, Left Updated: 10/30/23 1523     Protime 14.1 seconds      INR 1.10    APTT [[id number]]  (Abnormal) Collected: 10/30/23 1457    Lab Status: Final result Specimen: Blood from Arm, Left Updated: 10/30/23 1523     PTT 19 seconds     Vitamin D Panel [[id number]] Collected: 10/30/23 1457    Lab Status: In process Specimen: Arm, Left Updated: 10/30/23 1507    Acetaminophen level-If concentration is detectable, please discuss with medical  on call.  [[id number]]  (Abnormal) Collected: 10/30/23 1408    Lab Status: Final result Specimen: Blood from Arm, Left Updated: 10/30/23 1446     Acetaminophen Level <51 ug/mL     Salicylate level [[id number]]  (Normal) Collected: 10/30/23 1408    Lab Status: Final result Specimen: Blood from Arm, Left Updated: 12/05/53 5542     Salicylate Lvl <5 mg/dL     Ethanol [[id number]]  (Normal) Collected: 10/30/23 1408    Lab Status: Final result Specimen: Blood from Arm, Left Updated: 10/30/23 1445     Ethanol Lvl <10 mg/dL     Comprehensive metabolic panel [[id number]]  (Abnormal) Collected: 10/30/23 1408    Lab Status: Final result Specimen: Blood from Arm, Left Updated: 10/30/23 1445     Sodium 142 mmol/L      Potassium 3.3 mmol/L      Chloride 106 mmol/L      CO2 23 mmol/L      ANION GAP 13 mmol/L      BUN 8 mg/dL      Creatinine 0.81 mg/dL      Glucose 118 mg/dL      Calcium 9.4 mg/dL      AST 15 U/L      ALT 14 U/L      Alkaline Phosphatase 51 U/L      Total Protein 6.4 g/dL      Albumin 4.3 g/dL      Total Bilirubin 0.59 mg/dL      eGFR 86 ml/min/1.73sq m     Narrative:      National Kidney Disease Foundation guidelines for Chronic Kidney Disease (CKD):     Stage 1 with normal or high GFR (GFR > 90 mL/min/1.73 square meters)    Stage 2 Mild CKD (GFR = 60-89 mL/min/1.73 square meters)    Stage 3A Moderate CKD (GFR = 45-59 mL/min/1.73 square meters)    Stage 3B Moderate CKD (GFR = 30-44 mL/min/1.73 square meters)    Stage 4 Severe CKD (GFR = 15-29 mL/min/1.73 square meters)    Stage 5 End Stage CKD (GFR <15 mL/min/1.73 square meters)  Note: GFR calculation is accurate only with a steady state creatinine    CBC and differential [[id number]] Collected: 10/30/23 1408    Lab Status: Final result Specimen: Blood from Arm, Left Updated: 10/30/23 1416     WBC 8.76 Thousand/uL      RBC 4.50 Million/uL      Hemoglobin 14.1 g/dL      Hematocrit 41.7 %      MCV 93 fL      MCH 31.3 pg      MCHC 33.8 g/dL      RDW 13.3 %      MPV 11.0 fL      Platelets 596 Thousands/uL      nRBC 0 /100 WBCs      Neutrophils Relative 72 %      Immat GRANS % 0 %      Lymphocytes Relative 22 %      Monocytes Relative 4 %      Eosinophils Relative 1 %      Basophils Relative 1 %      Neutrophils Absolute 6.40 Thousands/µL      Immature Grans Absolute 0.02 Thousand/uL      Lymphocytes Absolute 1.90 Thousands/µL      Monocytes Absolute 0.33 Thousand/µL      Eosinophils Absolute 0.05 Thousand/µL      Basophils Absolute 0.06 Thousands/µL                    Imaging Studies:   Direct to CT: No  CT lower extremity wo contrast right   Final Result by Kaylyn Pete MD (10/30 6070)      Medial cuneiform and first through fifth metatarsal fractures. Workstation performed: [id number]         XR foot 2 views RIGHT   Final Result by Meg Sprague MD (53/41 6680)      Fractures of all of the metatarsals as described. Workstation performed: [id number]         XR foot 3+ views RIGHT   Final Result by Rafael Luz MD (10/30 4908)      Divergent Lisfranc injury as detailed above.       Other complex foot fractures as described above. I have personally reviewed this study including all images.  / R.J.F. Fractures of the midfoot were noted on the initial emergency medicine note. Resident: Ayaka Godwin, the attending radiologist, have reviewed the images and agree with the final report above. Workstation performed: [id number]         XR ankle 3+ views RIGHT   Final Result by Cole Turner MD (10/30 9324)      Divergent Lisfranc injury as detailed above. Other complex foot fractures as described above. I have personally reviewed this study including all images.  / R.J.F. Fractures of the midfoot were noted on the initial emergency medicine note. Resident: Ayaka Godwin, the attending radiologist, have reviewed the images and agree with the final report above. Workstation performed: [id number]               Procedures  ECG 12 Lead Documentation Only    Date/Time: 10/30/2023 2:25 PM    Performed by: Joellen Sellers DO  Authorized by: Joellen Sellers DO    Indications / Diagnosis:  Trauma  ECG reviewed by me, the ED Provider: yes    Patient location:  ED  Previous ECG:     Comparison to cardiac monitor: Yes    Rate:     ECG rate:  96    ECG rate assessment: normal    Rhythm:     Rhythm: sinus rhythm    Ectopy:     Ectopy: none    QRS:     QRS axis:  Normal    QRS intervals:  Normal  Conduction:     Conduction: normal    ST segments:     ST segments:  Normal  T waves:     T waves: non-specific    Other findings:     Other findings: prolonged qTc interval    Orthopedic injury treatment    Date/Time: 10/30/2023 3:26 PM    Performed by: Joellen Sellers DO  Authorized by: Joellen Sellers DO    Patient Location:  ED  [place] Protocol:  Consent: Verbal consent obtained.   Risks and benefits: risks, benefits and alternatives were discussed  Consent given by: patient  Time out: Immediately prior to procedure a "time out" was called to verify the correct patient, procedure, equipment, support staff and site/side marked as required. Patient understanding: patient states understanding of the procedure being performed  Patient identity confirmed: verbally with patient and arm band    Injury location:  Foot  Location details:  Right foot  Injury type:  Fracture-dislocation  Fracture type: first metatarsal, second metatarsal, third metatarsal, fourth metatarsal and fifth metatarsal    Neurovascular status: Neurovascularly intact    Distal perfusion: normal    Neurological function: normal    Range of motion: reduced    Local anesthesia used?: No    Sedation type:   Moderate (conscious) sedation (See separate Procedural Sedation form)  Manipulation performed?: Yes    Reduction successful?: Yes    Confirmation: Reduction confirmed by x-ray    Immobilization:  Other (comment) (CAM walker)  Neurovascular status: Neurovascularly intact    Distal perfusion: normal    Neurological function: normal    Range of motion: improved    Patient tolerance:  Patient tolerated the procedure well with no immediate complications  Procedural Sedation    Date/Time: 10/30/2023 4:28 PM    Performed by: Taryn Albright DO  Authorized by: Taryn Albright DO    Immediate pre-procedure details:     Reassessment: Patient reassessed immediately prior to procedure      Reviewed: vital signs, relevant labs/tests and NPO status      Verified: bag valve mask available, emergency equipment available, intubation equipment available, IV patency confirmed, oxygen available and suction available    Procedure details (see MAR for exact dosages):     Sedation start time:  10/30/2023 3:28 PM    Preoxygenation:  Nasal cannula    Sedation:  Propofol    Analgesia:  Fentanyl    Intra-procedure monitoring:  Blood pressure monitoring, continuous capnometry, frequent LOC assessments, frequent vital sign checks, continuous pulse oximetry and cardiac monitor    Intra-procedure events: none Intra-procedure management:  Airway repositioning    Sedation end time:  10/30/2023 4:13 PM    Total sedation time (minutes):  45  Post-procedure details:     Post-sedation assessment completed:  10/30/2023 4:29 PM    Attendance: Constant attendance by certified staff until patient recovered      Recovery: Patient returned to pre-procedure baseline      Post-sedation assessments completed and reviewed: airway patency, cardiovascular function, hydration status, mental status, nausea/vomiting, pain level, respiratory function and temperature      Patient is stable for discharge or admission: yes      Patient tolerance: Tolerated well, no immediate complications  Splint application    Date/Time: 10/30/2023 4:30 PM    Performed by: Rajani Kraft DO  Authorized by: Rajani Kraft DO  Universal Protocol:  Consent: Verbal consent obtained. Risks and benefits: risks, benefits and alternatives were discussed  Timeout called at: 10/30/2023 4:30 PM.  Patient identity confirmed: verbally with patient and arm band    Pre-procedure details:     Sensation:  Normal  Procedure details:     Laterality:  Right    Location:  Foot    Foot:  R foot    Supplies used: CAM Walker. Post-procedure details:     Pain:  Improved    Sensation:  Normal    Patient tolerance of procedure: Tolerated well, no immediate complications    Conscious Sedation Assessment      Flowsheet Row Classification Score   ASA Scale Assessment 1-Healthy patient, no disease outside surgical process filed at 10/30/2023 5351               ED Course           Medical Decision Making  70-year-old female presenting after injuring her right foot at home. Amount and/or Complexity of Data Reviewed  Labs: ordered. Decision-making details documented in ED Course. Radiology: ordered and independent interpretation performed. Decision-making details documented in ED Course.      Details: My independent interpretation of the right foot x-ray reveals a fracture dislocation of the right first metatarsal at the base and displaced fractures of the second third and fifth metatarsals distally. ECG/medicine tests: ordered and independent interpretation performed. Decision-making details documented in ED Course. Discussion of management or test interpretation with external provider(s): Discussed with podiatry, Cherelle Kaplan, reviewed pre and post reduction images. Requested CT scan. Discussed with hospitalist for admission for pain control. Risk  OTC drugs. Prescription drug management. Decision regarding hospitalization.                 Disposition  [facility] Transfer: No  Final diagnoses:   Fracture of 1st metatarsal   Fracture of second metatarsal bone of right foot   Fracture of third metatarsal bone of right foot   Fracture of fourth metatarsal bone of right foot   Fracture of fifth metatarsal bone of right foot   Lisfranc dislocation, right, initial encounter   Fall, initial encounter     Time reflects when diagnosis was documented in both MDM as applicable and the Disposition within this note       Time User Action Codes Description Comment    10/30/2023  4:52 PM Peter Le T Add [S92.313A] Fracture of 1st metatarsal     10/30/2023  4:52 PM Peter Le T Add [V27.029E] Fracture of second metatarsal bone of right foot     10/30/2023  4:52 PM Peter Le T Add [B64.698L] Fracture of third metatarsal bone of right foot     10/30/2023  4:53 PM Peter Le T Add [P43.395K] Fracture of fourth metatarsal bone of right foot     10/30/2023  4:53 PM Peter Le T Add [S92.351A] Fracture of base of fifth metatarsal bone of right foot     10/30/2023  4:53 PM Peter Le T Remove [S92.351A] Fracture of base of fifth metatarsal bone of right foot     10/30/2023  4:53 PM Peter Le T Add [S92.351A] Fracture of fifth metatarsal bone of right foot     10/30/2023  4:53 PM Peter Le T Add [S93.324A] Lisfranc dislocation, right, initial encounter     10/30/2023  5:21 PM Cholo BERNARD Add [E07. Victor Manuel Lea, initial encounter           ED Disposition       ED Disposition   Admit    Condition   Stable    Date/Time   Mon Oct 30, 2023 1721    Comment   Case was discussed with RONY and the patient's admission status was agreed to be Admission Status: observation status to the service of Dr. Vane Rosas. Follow-up Information       Follow up With Specialties Details Why [address], [place] Podiatry, Wound Care [facility]  [address] [facility] [address],   Follow up  [address] [facility] [place] [address]  [phone]      Shantelle Maguire DPM Podiatry, Wound Care Follow up in 8 day(s) You have appt with Dr Gary Lujan on Wed 11/8/23 at 11:00 am in the [facility].  Please arrive at 2766. [address]  [phone]            Discharge Medication List as of 10/31/2023  9:11 AM        START taking these medications    Details   acetaminophen (TYLENOL) 325 mg tablet Take 3 tablets (975 mg total) by mouth every 8 (eight) hours, Starting Tue 10/31/2023, No Print      oxyCODONE (ROXICODONE) 10 MG TABS Take 1 tablet (10 mg total) by mouth every 6 (six) hours as needed for moderate pain for up to 4 days Max Daily Amount: 40 mg, Starting Tue 10/31/2023, Until Sat 11/4/2023 at 2359, Normal           STOP taking these medications       fluvoxaMINE (LUVOX) 100 mg tablet Comments:   Reason for Stopping:         ibuprofen (MOTRIN) 800 mg tablet Comments:   Reason for Stopping:         ondansetron (ZOFRAN) 4 mg tablet Comments:   Reason for Stopping:                 PDMP Review       None            ED Provider  Electronically Signed by           Bandar Oscar DO  10/30/23 DO Edward  11/01/23 5672 these medications       fluvoxaMINE (LUVOX) 100 mg tablet Comments:   Reason for Stopping:         ibuprofen (MOTRIN) 800 mg tablet Comments:   Reason for Stopping:         ondansetron (ZOFRAN) 4 mg tablet Comments:   Reason for Stopping:                 PDMP Review       None            ED Provider  Electronically Signed by           Mukul Peng DO  10/30/23 DO Edward  11/01/23 [address] [place], DO  11/07/23 0059

## 2023-10-31 VITALS
HEIGHT: 65 IN | WEIGHT: 166.23 LBS | SYSTOLIC BLOOD PRESSURE: 122 MMHG | RESPIRATION RATE: 18 BRPM | TEMPERATURE: 99.6 F | HEART RATE: 92 BPM | DIASTOLIC BLOOD PRESSURE: 79 MMHG | OXYGEN SATURATION: 98 % | BODY MASS INDEX: 27.7 KG/M2

## 2023-10-31 LAB
ANION GAP SERPL CALCULATED.3IONS-SCNC: 9 MMOL/L
BASOPHILS # BLD AUTO: 0.08 THOUSANDS/ÂΜL (ref 0–0.1)
BASOPHILS NFR BLD AUTO: 1 % (ref 0–1)
BUN SERPL-MCNC: 10 MG/DL (ref 5–25)
CALCIUM SERPL-MCNC: 8.9 MG/DL (ref 8.4–10.2)
CHLORIDE SERPL-SCNC: 107 MMOL/L (ref 96–108)
CO2 SERPL-SCNC: 26 MMOL/L (ref 21–32)
CREAT SERPL-MCNC: 0.65 MG/DL (ref 0.6–1.3)
DME PARACHUTE DELIVERY DATE REQUESTED: NORMAL
DME PARACHUTE DELIVERY DATE REQUESTED: NORMAL
DME PARACHUTE ITEM DESCRIPTION: NORMAL
DME PARACHUTE ITEM DESCRIPTION: NORMAL
DME PARACHUTE ORDER STATUS: NORMAL
DME PARACHUTE ORDER STATUS: NORMAL
DME PARACHUTE SUPPLIER NAME: NORMAL
DME PARACHUTE SUPPLIER NAME: NORMAL
DME PARACHUTE SUPPLIER PHONE: NORMAL
DME PARACHUTE SUPPLIER PHONE: NORMAL
EOSINOPHIL # BLD AUTO: 0.07 THOUSAND/ÂΜL (ref 0–0.61)
EOSINOPHIL NFR BLD AUTO: 1 % (ref 0–6)
ERYTHROCYTE [DISTWIDTH] IN BLOOD BY AUTOMATED COUNT: 13.5 % (ref 11.6–15.1)
GFR SERPL CREATININE-BSD FRML MDRD: 105 ML/MIN/1.73SQ M
GLUCOSE SERPL-MCNC: 115 MG/DL (ref 65–140)
HCT VFR BLD AUTO: 37.7 % (ref 34.8–46.1)
HGB BLD-MCNC: 13.1 G/DL (ref 11.5–15.4)
IMM GRANULOCYTES # BLD AUTO: 0.05 THOUSAND/UL (ref 0–0.2)
IMM GRANULOCYTES NFR BLD AUTO: 0 % (ref 0–2)
LYMPHOCYTES # BLD AUTO: 3.46 THOUSANDS/ÂΜL (ref 0.6–4.47)
LYMPHOCYTES NFR BLD AUTO: 26 % (ref 14–44)
MCH RBC QN AUTO: 32 PG (ref 26.8–34.3)
MCHC RBC AUTO-ENTMCNC: 34.7 G/DL (ref 31.4–37.4)
MCV RBC AUTO: 92 FL (ref 82–98)
MONOCYTES # BLD AUTO: 0.79 THOUSAND/ÂΜL (ref 0.17–1.22)
MONOCYTES NFR BLD AUTO: 6 % (ref 4–12)
NEUTROPHILS # BLD AUTO: 9.1 THOUSANDS/ÂΜL (ref 1.85–7.62)
NEUTS SEG NFR BLD AUTO: 66 % (ref 43–75)
NRBC BLD AUTO-RTO: 0 /100 WBCS
PLATELET # BLD AUTO: 273 THOUSANDS/UL (ref 149–390)
PMV BLD AUTO: 11.5 FL (ref 8.9–12.7)
POTASSIUM SERPL-SCNC: 2.9 MMOL/L (ref 3.5–5.3)
RBC # BLD AUTO: 4.1 MILLION/UL (ref 3.81–5.12)
SODIUM SERPL-SCNC: 142 MMOL/L (ref 135–147)
WBC # BLD AUTO: 13.55 THOUSAND/UL (ref 4.31–10.16)

## 2023-10-31 PROCEDURE — 97163 PT EVAL HIGH COMPLEX 45 MIN: CPT

## 2023-10-31 PROCEDURE — 99244 OFF/OP CNSLTJ NEW/EST MOD 40: CPT | Performed by: PODIATRIST

## 2023-10-31 PROCEDURE — 99239 HOSP IP/OBS DSCHRG MGMT >30: CPT | Performed by: FAMILY MEDICINE

## 2023-10-31 PROCEDURE — 97167 OT EVAL HIGH COMPLEX 60 MIN: CPT

## 2023-10-31 PROCEDURE — 85025 COMPLETE CBC W/AUTO DIFF WBC: CPT | Performed by: FAMILY MEDICINE

## 2023-10-31 PROCEDURE — 80048 BASIC METABOLIC PNL TOTAL CA: CPT | Performed by: FAMILY MEDICINE

## 2023-10-31 RX ORDER — OXYCODONE HYDROCHLORIDE 10 MG/1
10 TABLET ORAL EVERY 6 HOURS PRN
Qty: 16 TABLET | Refills: 0 | Status: SHIPPED | OUTPATIENT
Start: 2023-10-31 | End: 2023-11-04

## 2023-10-31 RX ORDER — ACETAMINOPHEN 325 MG/1
975 TABLET ORAL EVERY 8 HOURS SCHEDULED
Refills: 0
Start: 2023-10-31

## 2023-10-31 RX ORDER — ONDANSETRON 2 MG/ML
4 INJECTION INTRAMUSCULAR; INTRAVENOUS EVERY 6 HOURS PRN
Status: DISCONTINUED | OUTPATIENT
Start: 2023-10-31 | End: 2023-10-31 | Stop reason: HOSPADM

## 2023-10-31 RX ADMIN — ACETAMINOPHEN 975 MG: 325 TABLET, FILM COATED ORAL at 13:17

## 2023-10-31 RX ADMIN — ONDANSETRON 4 MG: 2 INJECTION INTRAMUSCULAR; INTRAVENOUS at 04:35

## 2023-10-31 RX ADMIN — OXYCODONE HYDROCHLORIDE 5 MG: 5 TABLET ORAL at 08:59

## 2023-10-31 RX ADMIN — FENTANYL CITRATE 50 MCG: 50 INJECTION INTRAMUSCULAR; INTRAVENOUS at 11:37

## 2023-10-31 RX ADMIN — FENTANYL CITRATE 50 MCG: 50 INJECTION INTRAMUSCULAR; INTRAVENOUS at 03:14

## 2023-10-31 RX ADMIN — OXYCODONE HYDROCHLORIDE 5 MG: 5 TABLET ORAL at 13:17

## 2023-10-31 RX ADMIN — ACETAMINOPHEN 975 MG: 325 TABLET, FILM COATED ORAL at 05:00

## 2023-10-31 RX ADMIN — OXYCODONE HYDROCHLORIDE 5 MG: 5 TABLET ORAL at 04:34

## 2023-10-31 RX ADMIN — FENTANYL CITRATE 50 MCG: 50 INJECTION INTRAMUSCULAR; INTRAVENOUS at 07:29

## 2023-10-31 NOTE — CASE MANAGEMENT
Case Management Discharge Planning Note    Patient name Kathy Quiñones  Location [address] [facility] [id number] MRN [id number]  : 1975 Date 10/31/2023       Current Admission Date: 10/30/2023  Current Admission Diagnosis:Closed fracture of right foot with routine healing   Patient Active Problem List    Diagnosis Date Noted    Closed fracture of right foot with routine healing 10/30/2023      LOS (days): 0  Geometric Mean LOS (GMLOS) (days):   Days to GMLOS:     OBJECTIVE:            Current admission status: Observation   Preferred Pharmacy:   576 [facility], [address]  [address]  Phone: [phone] Fax: 2326 [facility], [address] [address]  Phone: [phone] Fax: 2303 [facility], [address] [facility] [address] [facility] [address]  Phone: [phone] Fax: [phone]    Primary Care Provider: No primary care provider on file. Primary Insurance: [employer] FOR YOU  Secondary Insurance:     DISCHARGE DETAILS:     Patient stable for discharge home today. Appt made with Dr Dennis Sagluero for 23 at [facility]. Meds to bed sent to [facility] for Oxycodone to be delivered to [facility] today. Called home star anticipated delivery with 1600 drop off time    [place] of choice was provided in regards to needing a home medical equipment company, pt does not have preference. Pt is aware that we frequently utilized [employer] as we have a working relationship with this company. Patients choice is to use [employer]. .    Order for walker placed in [facility]. Physical therapy to provide walker at bedside.    Gabbi Harrison covered by insurance        CM spoke with patient she stated she will need a ride home due to boyfriend goes to work at 56 am.      Request for transport via Round trip confirmed  by medical sedan transport home for 1700 today. Patient aware of  time. Meds to bed to bed delivered to [facility] approx 1600 , follow up appt with dr Tammy Christopher 11/8/23 at 1100am.    Nursing aware of [facility]  at 1700 today after patient gets her meds from [facility] meds to bed. Patient asking for shower chair. Order placed in [place] for [employer] to deliver to patient home.

## 2023-10-31 NOTE — DISCHARGE SUMMARY
Discharge Summary - Ofelia Chambers 50 y.o. female MRN: [id number]    Unit/Bed#: 729-99 Encounter: [id number]    Admission Date:   Admission Orders (From admission, onward)       Ordered        10/30/23 1721  Place in Observation  Once            10/30/23 1730  Place in Observation  Once                            Admitting Diagnosis: Ankle pain [M25.579]  Fracture of 1st metatarsal [S92.313A]  Syncope and collapse [R55]  Fracture of third metatarsal bone of right foot [S92.331A]  Fracture of second metatarsal bone of right foot [S92.321A]  Fracture of fifth metatarsal bone of right foot [S92.351A]  Fracture of fourth metatarsal bone of right foot [S92.341A]  Lisfranc dislocation, right, initial encounter [S93.324A]    HPI: 42-year-old otherwise healthy female presents the emergency room after sustaining a fall and developing right ankle pain. Patient states she got up at approximately 1 AM to use the bathroom, and thinks she fell and heard a pop. She was unable to get up due to pain and able to get to the hallway to [facility] and her neighbors. The case was reviewed with on-call podiatry who recommended discharge home, however patient continues to have intractable pain and was recommended for observation in order to gain better control of pain. Procedures Performed:   Orders Placed This Encounter   Procedures    ED ECG Documentation Only    Orthopedic injury treatment    Procedural Sedation    Splint application       Summary of Hospital Course:     Right Foot Fracture    Patient is a 50year old female who presented to the ED after sustaining a ground level fall at home that resulted in a lisfrank and multiple metatarsal fractures.  Case was discussed by ED provider with on call Podiatry Carmita Presley) who recommended discharge home with close outpatient follow up and pain control    Pain control was problematic and the patient was recommended for observation to start a mutltimodal pain control regimen  Patient was started on scheduled tylenol and PRN oxycodone which was effective. She stated she had some insurance issues due to a change in last name, therefore I sent narcotic prescription to [address] [place] [address] so meds to beds could provide and on follow up with her pcp or podiatrist they could update the prescription. Significant Findings, Care, Treatment and Services Provided:     XR    Fractures of all of the metatarsals    Divergent Lisfranc injury with:   -Comminuted intra-articular fracture of the base of the first metatarsal accompanied by medial dislocation,   -Transverse fracture of the neck of the second metatarsal with lateral translation and shortening of the distal fracture fragment.   -Transverse fracture of the neck of the third metatarsal with lateral translation and shortening of the distal fracture fragment. There is also a transverse extra-articular fracture at the base of the third metatarsal.   -Comminuted extra-articular fracture of the base of the fourth metatarsal.   -Intra-articular fracture of the fifth metatarsal head/neck with mild angulation, apex dorsal.     Complications: none    Discharge Diagnosis: see above    Medical Problems       Resolved Problems  Date Reviewed: 10/30/2023   None         Condition at Discharge: fair         Discharge instructions/Information to patient and family:   See after visit summary for information provided to patient and family. Provisions for Follow-Up Care:  See after visit summary for information related to follow-up care and any pertinent home health orders. PCP: No primary care provider on file. Disposition: Home    Planned Readmission: No      Discharge Statement   I spent 40 minutes discharging the patient. This time was spent on the day of discharge. I had direct contact with the patient on the day of discharge. Additional documentation is required if more than 30 minutes were spent on discharge.      Discharge Medications:  See after visit summary for reconciled discharge medications provided to patient and family.

## 2023-10-31 NOTE — PLAN OF CARE
Problem: PHYSICAL THERAPY ADULT  Goal: Performs mobility at highest level of function for planned discharge setting. See evaluation for individualized goals. Description: Treatment/Interventions: Functional transfer training, LE strengthening/ROM, Therapeutic exercise, Endurance training, Bed mobility, Gait training  Equipment Recommended: Karolina Auguste       See flowsheet documentation for full assessment, interventions and recommendations. Note: Prognosis: Good  Problem List: Decreased strength, Decreased range of motion, Decreased endurance, Impaired balance, Decreased mobility, Pain, Orthopedic restrictions  Assessment: Patient is a 50 y.o. female evaluated by Physical Therapy s/p admit to [address] [place] [address] on 10/30/2023 with admitting diagnosis of: Ankle pain, Fracture of 1st metatarsal, Syncope and collapse, Fracture of third metatarsal bone of right foot, Fracture of second metatarsal bone of right foot, Fracture of fifth metatarsal bone of right foot, Fracture of fourth metatarsal bone of right foot, Lisfranc dislocation, right, initial encounter, and principal problem of: Closed fracture of right foot with routine healing. PT was consulted to assess patient's functional mobility and discharge needs. Ordered are PT Evaluation and treatment with activity level of: NWB Right LE. Comorbidities affecting patient's physical performance at time of assessment include:  hx of hodgkin's lymphoma, unspecified ovarian cysts . Personal factors affecting the patient at time of IE include: ambulating with assistive device, inability to navigate community distances, history of fall(s), inability/difficulty performing IADLs, and inability/difficulty performing ADLs. Please locate objective findings from PT assessment regarding body systems outlined above. Upon evaluation, pt able to perform all functional mobility with SUP, RW, and increased time. Occasional verbal cuing provided for safety awareness and sequencing. Education provided on proper RW use and how to maintain NWB status of R LE. Pt demonstrating good understanding and seated rest break taken following 20' of ambulation. HR and SpO2 remained WFL on RA throughout. Mild postural sway demonstrated but no true LOB experienced. The patient's AM-PAC Basic Mobility Inpatient Short Form Raw Score is 19. A Raw score of greater than 16 suggests the patient may benefit from discharge to home. Please also refer to the recommendation of the Physical Therapist for safe discharge planning. Co treatment with OT secondary to complex medical condition of pt, possible A of 2 required to achieve and maintain transitional movements, requiring the need of skilled therapeutic intervention of 2 therapists to achieve delivery of services. Pt will benefit from continued PT intervention during LOS to address current deficits, increase LOF, and facilitate safe d/c to next level of care when medically appropriate. D/c recommendation at this time is rehab resource intensity level III. Rehab Resource Intensity Level, PT: III (Minimum Resource Intensity)    See flowsheet documentation for full assessment.

## 2023-10-31 NOTE — PHYSICAL THERAPY NOTE
Physical Therapy Evaluation    Patient Name: Lorence Dancer    KTVOS'T Date: 10/31/2023     Problem List  Principal Problem:    Closed fracture of right foot with routine healing       Past Medical History  Past Medical History:   Diagnosis Date    Back pain     Hodgkin's disease ([address])     Hodgkin's lymphoma ([address])     Migraine     Unspecified ovarian cysts         Past Surgical History  Past Surgical History:   Procedure Laterality Date    APPENDECTOMY      CHOLECYSTECTOMY      HYSTERECTOMY      LYMPH NODE BIOPSY      left neck    MEDIPORT INSERTION, SINGLE      MEDIPORT REMOVAL      TUBAL LIGATION             10/31/23 0909   PT Last Visit   PT Visit Date 10/31/23   Note Type   Note type Evaluation   Pain Assessment   Pain Assessment Tool 0-10   Pain Score 8   Pain Location/Orientation Orientation: Right;Location: Foot   Restrictions/Precautions   Weight Bearing Precautions Per Order Yes   RLE Weight Bearing Per Order NWB   Other Precautions Pain; Fall Risk; Chair Alarm; Bed Alarm   Home Living   Type of [address] [place] [address] One level;Stairs to enter with rails  (3 REUBEN vs no stairs from rear entrance)   Bathroom Shower/Tub Tub/shower unit   [employer] Raised   Bathroom Equipment Grab bars in shower;Grab bars around toilet   Bathroom Accessibility Accessible   Additional Comments pt denies use of DME at baseline   Prior Function   Level of [place] Independent with functional mobility; Independent with ADLs; Independent with IADLS   Lives With Significant other   Receives Help From Family   IADLs Family/Friend/Other provides transportation   Falls in the last 6 months 1 to 4   Vocational Unemployed   General   Family/Caregiver Present No   Cognition   Overall Cognitive Status WFL   Arousal/Participation Alert   Orientation Level Oriented X4   Following Commands Follows all commands and directions without difficulty   Subjective   Subjective pt nervous about possible pain with mobility but motivated to participate   RLE Assessment   RLE Assessment X  (WFL at knee and hip; NT at ankle d/t NWB status)   LLE Assessment   LLE Assessment WFL   Bed Mobility   Supine to Sit 5  Supervision   Additional items Increased time required;Verbal cues   Sit to Supine   (pt OOB at end of session)   Transfers   Sit to Stand 5  Supervision   Additional items Increased time required;Verbal cues   Stand to Sit 5  Supervision   Additional items Increased time required;Verbal cues   Toilet transfer 5  Supervision   Additional items Increased time required;Verbal cues;Standard toilet   Additional Comments RW used   Ambulation/Elevation   Gait pattern Short stride; Foward flexed;Decreased foot clearance   Gait Assistance 5  Supervision   Additional items Verbal cues   Assistive Device Rolling walker   Distance 20'   Balance   Static Sitting Good   Dynamic Sitting Good   Static Standing Fair   Dynamic Standing [address] -  (with RW)   Endurance Deficit   Endurance Deficit Yes   Activity Tolerance   Activity Tolerance Patient limited by fatigue;Patient limited by pain   Assessment   Prognosis Good   Problem List Decreased strength;Decreased range of motion;Decreased endurance; Impaired balance;Decreased mobility;Pain;Orthopedic restrictions   Assessment Patient is a 50 y.o. female evaluated by Physical Therapy s/p admit to [address] [place] [address] on 10/30/2023 with admitting diagnosis of: Ankle pain, Fracture of 1st metatarsal, Syncope and collapse, Fracture of third metatarsal bone of right foot, Fracture of second metatarsal bone of right foot, Fracture of fifth metatarsal bone of right foot, Fracture of fourth metatarsal bone of right foot, Lisfranc dislocation, right, initial encounter, and principal problem of: Closed fracture of right foot with routine healing. PT was consulted to assess patient's functional mobility and discharge needs.  Ordered are PT Evaluation and treatment with activity level of: NWB Right LE. Comorbidities affecting patient's physical performance at time of assessment include:  hx of hodgkin's lymphoma, unspecified ovarian cysts . Personal factors affecting the patient at time of IE include: ambulating with assistive device, inability to navigate community distances, history of fall(s), inability/difficulty performing IADLs, and inability/difficulty performing ADLs. Please locate objective findings from PT assessment regarding body systems outlined above. Upon evaluation, pt able to perform all functional mobility with SUP, RW, and increased time. Occasional verbal cuing provided for safety awareness and sequencing. Education provided on proper RW use and how to maintain NWB status of R LE. Pt demonstrating good understanding and seated rest break taken following 20' of ambulation. HR and SpO2 remained WFL on RA throughout. Mild postural sway demonstrated but no true LOB experienced. The patient's AM-PAC Basic Mobility Inpatient Short Form Raw Score is 19. A Raw score of greater than 16 suggests the patient may benefit from discharge to home. Please also refer to the recommendation of the Physical Therapist for safe discharge planning. Co treatment with OT secondary to complex medical condition of pt, possible A of 2 required to achieve and maintain transitional movements, requiring the need of skilled therapeutic intervention of 2 therapists to achieve delivery of services. Pt will benefit from continued PT intervention during LOS to address current deficits, increase LOF, and facilitate safe d/c to next level of care when medically appropriate. D/c recommendation at this time is rehab resource intensity level III. Goals   Patient Goals to go home   LTG Expiration Date 11/14/23   Long Term Goal #1 Pt will participate in B LE strengthening exercises to facilitate improved functional activity tolerance.  Pt will perform all functional transfers and bed mobility mod(I) with good safety awareness. Pt will ambulate 250' mod(I) with LRAD while maintaining good functional dynamic balance. Plan   Treatment/Interventions Functional transfer training;LE strengthening/ROM; Therapeutic exercise; Endurance training;Bed mobility;Gait training   PT Frequency 3-5x/wk   Discharge Recommendation   Rehab Resource Intensity Level, PT III (Minimum Resource Intensity)   Equipment Recommended [address] [facility] Recommended Wheeled walker   Change/add to [place]? No   Additional Comments walker dispensed same day of evaluation 10/31   AM-PAC Basic Mobility Inpatient   Turning in Flat Bed Without Bedrails 4   Lying on Back to Sitting on Edge of Flat Bed Without Bedrails 4   Moving Bed to Chair 3   Standing Up From Chair Using Arms 3   Walk in Room 3   Climb 3-5 Stairs With Railing 2   Basic Mobility Inpatient Raw Score 19   Basic Mobility Standardized Score 42.48   Highest Level Of Mobility   JH-HLM Goal 6: Walk 10 steps or more   JH-HLM Achieved 6: Walk 10 steps or more   End of Consult   Patient Position at End of Consult Bedside chair;Bed/Chair alarm activated; All needs within reach

## 2023-10-31 NOTE — NURSING NOTE
AVS reviewed, pt verbalized understanding.  Pt d/c'd in stable condition accompanied off the floor by medical sedan attendant

## 2023-10-31 NOTE — CONSULTS
[facility] Podiatry - Consultation    Patient Information:   Lela Reynoso 50 y.o. female MRN: [id number]  Unit/Bed#: 586-32 Encounter: [id number]  PCP: No primary care provider on file. Date of Admission:  10/30/2023  Date of Consultation: 10/31/23  Requesting Physician: Chelsy Jonas MD      ASSESSMENT:    Lela Reynoso is a 50 y.o. female with:    Lis franc medial dislocation and nutcracker fracture  Metatarsal fractures 2 3 heads and bases for, 5 and head of left fifth metatarsal fracture    PLAN:    Will need staged ORIF on outpateint basis, she stable for discharge from podiatric standpoint in cam boot, strict nonweightbearing   Ice, anti-inflammatories for the pain for now  - She will need extensive pain management postoperatively, she would like to need a popliteal block preoperatively  - We will plan for surgical intervention on 11/9. She will need staged procedures she will need minimal application medial column and likely percutaneous reduction if possible to the metatarsal head fractures within waiting for consolidation between 6 and 8 weeks postoperatively and then we will return to OR for first, intercuneiform, second fusion  - We did discuss further worsening her pain eventually with posttraumatic arthritis and we did discuss that this is rather severe injury and will take multiple months of strict nonweightbearing to facilitate healing  Rest of care per primary team.  X-rays and CT were both reviewed and she has medial cuneiform fractures fractures of the bases 1, 3, 4, 5 metatarsals, necks of 2, 3 head of 5. SUBJECTIVE    History of Present Illness:    Lela Reynoso is a 50 y.o. female with past medical history of fall who presents with multiple metatarsal fractures right foot. Review of Systems:    Constitutional: Negative. HENT: Negative. Eyes: Negative. Respiratory: Negative. Cardiovascular: Negative. Gastrointestinal: Negative.     Musculoskeletal: neg   Skin:as above   Neurological: neg   Psych: Negative. Past Medical and Surgical History:     Past Medical History:   Diagnosis Date    Back pain     Hodgkin's disease ([address])     Hodgkin's lymphoma ([address])     Migraine     Unspecified ovarian cysts        Past Surgical History:   Procedure Laterality Date    APPENDECTOMY      CHOLECYSTECTOMY      HYSTERECTOMY      LYMPH NODE BIOPSY      left neck    MEDIPORT INSERTION, SINGLE      MEDIPORT REMOVAL      TUBAL LIGATION         Meds/Allergies:    Medications Prior to Admission   Medication    fluvoxaMINE (LUVOX) 100 mg tablet    ibuprofen (MOTRIN) 800 mg tablet    ondansetron (ZOFRAN) 4 mg tablet       Allergies   Allergen Reactions    Toradol [Ketorolac Tromethamine] Shortness Of Breath    Cephalosporins      RASH AND SOB    Codeine     Dilaudid  [Hydromorphone Hcl]      Other reaction(s): Hallucinations    Dilaudid [Hydromorphone Hcl]      Hallucinations    Fentanyl      DURAGESIS PATCH. RASH  AND EDEMA  DURAGESIS PATCH. RASH  AND EDEMA    Morphine      Other reaction(s): Hallucinations    Morphine And Related      Hallucinations    Phenergan [Promethazine Hcl]      Other reaction(s): Hallucinations  hallucinations    Reglan [Metoclopramide]      Other reaction(s): Hallucinations  Hallucinations    Codeine Sulfate Rash    Penicillins Other (See Comments)     Happened when infant doesn't know reaction       Social History:     Marital Status: /Civil Union    Substance Use History:   Social History     Substance and Sexual Activity   Alcohol Use Not Currently     Social History     Tobacco Use   Smoking Status Former    Packs/day: 0.50    Types: Cigarettes    Quit date: 3/24/2019    Years since quittin.6   Smokeless Tobacco Never     Social History     Substance and Sexual Activity   Drug Use Yes    Types: Marijuana       Family History:    Family History   Problem Relation Age of Onset    Heart disease Mother     Hypertension Mother     Asthma Mother     COPD Mother Heart disease Father     Hypertension Father     Cancer Father          OBJECTIVE:    Vitals:   Blood Pressure: 120/78 (10/31/23 0719)  Pulse: 96 (10/31/23 0719)  Temperature: 99.4 °F (37.4 °C) (10/31/23 0719)  Temp Source: Oral (10/31/23 0719)  Respirations: 19 (10/31/23 0719)  Height: 5' 5" (165.1 cm) (10/30/23 1820)  Weight - Scale: 75.4 kg (166 lb 3.6 oz) (10/30/23 1359)  SpO2: 97 % (10/31/23 0719)    Physical Exam:     General Appearance: Alert, cooperative, no distress. HEENT: Head normocephalic, atraumatic, without obvious abnormality. Heart: Normal rate and rhythm. Lungs: Non-labored breathing. No respiratory distress. Abdomen: Without distension. Psychiatric: AAOx3  Lower Extremity:  Vascular:   DP/PT pedal pulses on the left are present. DP/PT pedal pulses on the right are present. CRT brisk at the digits. Pedal hair is absent. 2+ edema noted right LE    Musculoskeletal:  Substantial guarding of the right foot, digital range of motion is very limited    Dermatological:  With palpation of the entire right foot      Additional Data:     Lab Results: I have personally reviewed pertinent labs including:    Results from last 7 days   Lab Units 10/31/23  0422   WBC Thousand/uL 13.55*   HEMOGLOBIN g/dL 13.1   HEMATOCRIT % 37.7   PLATELETS Thousands/uL 273   NEUTROS PCT % 66   LYMPHS PCT % 26   MONOS PCT % 6   EOS PCT % 1     Results from last 7 days   Lab Units 10/31/23  0422 10/30/23  1408   POTASSIUM mmol/L 2.9* 3.3*   CHLORIDE mmol/L 107 106   CO2 mmol/L 26 23   BUN mg/dL 10 8   CREATININE mg/dL 0.65 0.81   CALCIUM mg/dL 8.9 9.4   ALK PHOS U/L  --  51   ALT U/L  --  14   AST U/L  --  15     Results from last 7 days   Lab Units 10/30/23  1457   INR  1.10       Cultures: I have personally reviewed pertinent cultures including:              Imaging: I have personally reviewed pertinent films in PACS. EKG, Pathology, and Other Studies: I have personally reviewed pertinent reports.         ** Please Note: Portions of the record may have been created with voice recognition software. Occasional wrong word or "sound a like" substitutions may have occurred due to the inherent limitations of voice recognition software. Read the chart carefully and recognize, using context, where substitutions have occurred.  **

## 2023-10-31 NOTE — OCCUPATIONAL THERAPY NOTE
Occupational Therapy Evaluation     Patient Name: Cecilia George  [id number] Date: 10/31/2023  Problem List  Principal Problem:    Closed fracture of right foot with routine healing    Past Medical History  Past Medical History:   Diagnosis Date    Back pain     Hodgkin's disease ([address])     Hodgkin's lymphoma ([address])     Migraine     Unspecified ovarian cysts      Past Surgical History  Past Surgical History:   Procedure Laterality Date    APPENDECTOMY      CHOLECYSTECTOMY      HYSTERECTOMY      LYMPH NODE BIOPSY      left neck    MEDIPORT INSERTION, SINGLE      MEDIPORT REMOVAL      TUBAL LIGATION               10/31/23 0908   OT Last Visit   OT Visit Date 10/31/23   Note Type   Note type Evaluation   Pain Assessment   Pain Assessment Tool 0-10   Pain Score 8   Pain Location/Orientation Orientation: Right;Location: Foot   Restrictions/Precautions   Weight Bearing Precautions Per Order Yes   RLE Weight Bearing Per Order NWB   Braces or Orthoses CAM Boot  (R LE)   Other Precautions Fall Risk;Fluid restriction; Impulsive; Chair Alarm   Home Living   Type of [address] [place] [address] One level;Performs ADLs on one level; Able to live on main level with bedroom/bathroom; Elevator   Bathroom Shower/Tub Tub/shower unit   Bathroom Toilet Raised   Bathroom Equipment Grab bars in shower;Grab bars around toilet   [address] bars   Additional Comments pt reports use of no device during functional mobility at baseline   Prior Function   Level of [place] Independent with ADLs; Independent with functional mobility; Independent with IADLS   Lives With Significant other   IADLs Independent with driving   Falls in the last 6 months 1 to 4   Vocational Unemployed   Subjective   Subjective "so I can't put my foot on the floor?"   ADL   Where Assessed Other (Comment)  (bathroom)   Grooming Assistance 5  Supervision/Setup   Grooming Deficit Wash/dry hands;Standing with assistive device; Increased time to complete   LB Dressing Assistance 5  Supervision/Setup   Toileting Assistance  5  Supervision/Setup   Additional Comments pt performs functional ADLs with (S) level; no significant LOB in bathroom   Bed Mobility   Supine to Sit 6  Modified independent   Additional items Bedrails; Increased time required;Verbal cues   Sit to Supine   (pt seated in chair at end of session)   Additional Comments pt on RA during session; SpO2 WFL with no complaints of SOB, however HR elevated into 130s at one point during session; no complaints from the patient   Transfers   Sit to Stand 5  Supervision   Additional items Increased time required;Verbal cues  (RW)   Stand to Sit 5  Supervision   Additional items Increased time required;Verbal cues  (RW)   Toilet transfer 5  Supervision   Additional items Increased time required;Standard toilet;Verbal cues  (RW)   Additional Comments pt instructed in use of RW prior to performing functional transfers; demonstrates understanding of the same and no significant LOB or instability; mild cues for WBS to R LE   Functional Mobility   Functional Mobility 5  Supervision   Additional Comments pt performs functional mobility in room ~30ft; no significant LOB and minor cues for safety and use of RW with WBS   Additional items Rolling walker   Balance   Static Sitting Good   Dynamic Sitting Good   Static Standing Good   Dynamic Standing Fair +   Ambulatory Fair   Activity Tolerance   Activity Tolerance Patient limited by pain; Patient limited by fatigue   RUE Assessment   RUE Assessment WFL   LUE Assessment   LUE Assessment WFL   Hand Function   Gross Motor Coordination Functional   Fine Motor Coordination Functional   Sensation   Light Touch No apparent deficits   Sharp/Dull No apparent deficits   Psychosocial   Psychosocial (WDL) WDL   Perception   Inattention/Neglect Appears intact   Cognition   Overall Cognitive Status WFL   Arousal/Participation Alert   Attention Within functional limits   Orientation Level Oriented X4   Memory Within functional limits   Following Commands Follows all commands and directions without difficulty   Assessment   Limitation Decreased ADL status; Decreased Safe judgement during ADL;Decreased endurance;Decreased self-care trans;Decreased high-level ADLs   Assessment Pt is a 50 y.o. female seen for OT evaluation s/p admit to [facility] on 10/30/2023 w/ Closed fracture of right foot with routine healing. Comorbidities affecting pt's functional performance at time of assessment include:  fracture of 1st, 2nd, 3rd, 4th, and 5th metatarsal bone of R LE, lisfranc dislocation of R LE . Personal factors affecting pt at time of IE include:difficulty performing ADLS, difficulty performing IADLS , and health management . Prior to admission, pt was (I) with ADLs and IADLs with use of no device during mobility. Upon evaluation: Pt requires (S)-mod (I) level with RW during functional mobility 2* the following deficits impacting occupational performance: weakness, decreased strength, decreased balance, decreased tolerance, impaired initiation, decreased safety awareness, and increased pain. Pt to benefit from continued skilled OT tx while in the hospital to address deficits as defined above and maximize level of functional independence w ADL's and functional mobility. Occupational Performance areas to address include: grooming, bathing/shower, toilet hygiene, dressing, functional mobility, community mobility, and clothing management. The patient's raw score on the AM-PAC Daily Activity Inpatient Short Form is 22. A raw score of greater than or equal to 19 suggests the patient may benefit from discharge to home. Please refer to the recommendation of the Occupational Therapist for safe discharge planning.   Pt benefited from co-evaluation of skilled OT and PT therapists in order to most appropriately address functional deficits d/t extensive assistance required for safe functional mobility, decreased activity tolerance, and regression from functioning level prior to admission and/or onset of present illness. OT/PT objectives were addressed separately; please see PT note for specific goal areas targeted. Goals   Patient Goals to go home   Short Term Goal  pt will perform toilet transfers and hygiene at mod (I) level   Long Term Goal #1 pt will demonstrate functional mobility with RW at mod (I) level and NWB status to R LE   Long Term Goal #2 pt will perform UB/LB bathing and grooming tasks at (I) level   Plan   Treatment Interventions ADL retraining;Functional transfer training;UE strengthening/ROM; Endurance training;Patient/family training;Equipment evaluation/education; Activityengagement   Goal Expiration Date 11/14/23   OT Frequency 3-5x/wk   Discharge Recommendation   Rehab Resource Intensity Level, OT III (Minimum Resource Intensity)   Equipment Recommended Shower transfer bench ($$)   Additional Comments  pt to attend OP PT when appropriate   Additional Comments 2 pt also requires RW for discharge   AM-PAC Daily Activity Inpatient   Lower Body Dressing 3   Bathing 3   Toileting 4   Upper Body Dressing 4   Grooming 4   Eating 4   Daily Activity Raw Score 22   Daily Activity Standardized Score (Calc for Raw Score >=11) 47. 1   AM-PAC Applied Cognition Inpatient   Following a Speech/Presentation 4   Understanding Ordinary Conversation 4   Taking Medications 4   Remembering Where Things Are Placed or Put Away 4   Remembering List of 4-5 Errands 4   Taking Care of Complicated Tasks 4   Applied Cognition Raw Score 24   Applied Cognition Standardized Score 62.21

## 2023-10-31 NOTE — PLAN OF CARE
Problem: OCCUPATIONAL THERAPY ADULT  Goal: Performs self-care activities at highest level of function for planned discharge setting. See evaluation for individualized goals. Description: Treatment Interventions: ADL retraining, Functional transfer training, UE strengthening/ROM, Endurance training, Patient/family training, Equipment evaluation/education, Activityengagement  Equipment Recommended: Shower transfer bench ($$)       See flowsheet documentation for full assessment, interventions and recommendations. Note: Limitation: Decreased ADL status, Decreased Safe judgement during ADL, Decreased endurance, Decreased self-care trans, Decreased high-level ADLs     Assessment: Pt is a 50 y.o. female seen for OT evaluation s/p admit to [facility] on 10/30/2023 w/ Closed fracture of right foot with routine healing. Comorbidities affecting pt's functional performance at time of assessment include:  fracture of 1st, 2nd, 3rd, 4th, and 5th metatarsal bone of R LE, lisfranc dislocation of R LE . Personal factors affecting pt at time of IE include:difficulty performing ADLS, difficulty performing IADLS , and health management . Prior to admission, pt was (I) with ADLs and IADLs with use of no device during mobility. Upon evaluation: Pt requires (S)-mod (I) level with RW during functional mobility 2* the following deficits impacting occupational performance: weakness, decreased strength, decreased balance, decreased tolerance, impaired initiation, decreased safety awareness, and increased pain. Pt to benefit from continued skilled OT tx while in the hospital to address deficits as defined above and maximize level of functional independence w ADL's and functional mobility. Occupational Performance areas to address include: grooming, bathing/shower, toilet hygiene, dressing, functional mobility, community mobility, and clothing management. The patient's raw score on the AM-PAC Daily Activity Inpatient Short Form is 22.  A raw score of greater than or equal to 19 suggests the patient may benefit from discharge to home. Please refer to the recommendation of the Occupational Therapist for safe discharge planning. Pt benefited from co-evaluation of skilled OT and PT therapists in order to most appropriately address functional deficits d/t extensive assistance required for safe functional mobility, decreased activity tolerance, and regression from functioning level prior to admission and/or onset of present illness. OT/PT objectives were addressed separately; please see PT note for specific goal areas targeted.      Rehab Resource Intensity Level, OT: III (Minimum Resource Intensity)

## 2023-10-31 NOTE — PLAN OF CARE
Problem: PAIN - ADULT  Goal: Verbalizes/displays adequate comfort level or baseline comfort level  Description: Interventions:  - Encourage patient to monitor pain and request assistance  - Assess pain using appropriate pain scale  - Administer analgesics based on type and severity of pain and evaluate response  - Implement non-pharmacological measures as appropriate and evaluate response  - Consider cultural and social influences on pain and pain management  - Notify physician/advanced practitioner if interventions unsuccessful or patient reports new pain  Outcome: Progressing     Problem: INFECTION - ADULT  Goal: Absence or prevention of progression during hospitalization  Description: INTERVENTIONS:  - Assess and monitor for signs and symptoms of infection  - Monitor lab/diagnostic results  - Monitor all insertion sites, i.e. indwelling lines, tubes, and drains  - Monitor endotracheal if appropriate and nasal secretions for changes in amount and color  - [place] appropriate cooling/warming therapies per order  - Administer medications as ordered  - Instruct and encourage patient and family to use good hand hygiene technique  - Identify and instruct in appropriate isolation precautions for identified infection/condition  Outcome: Progressing     Problem: SAFETY ADULT  Goal: Patient will remain free of falls  Description: INTERVENTIONS:  - Educate patient/family on patient safety including physical limitations  - Instruct patient to call for assistance with activity   - Consult OT/PT to assist with strengthening/mobility   - Keep Call bell within reach  - Keep bed low and locked with side rails adjusted as appropriate  - Keep care items and personal belongings within reach  - Initiate and maintain comfort rounds  - Make Fall Risk Sign visible to staff  - Offer Toileting every 2 Hours, in advance of need  - Initiate/Maintain bed/chair alarm  - Obtain necessary fall risk management equipment: bed/chair alarm, nonskid socks, call bell within reach  - Apply yellow socks and bracelet for high fall risk patients  - Consider moving patient to room near nurses station  Outcome: Progressing  Goal: Maintain or return to baseline ADL function  Description: INTERVENTIONS:  -  Assess patient's ability to carry out ADLs; assess patient's baseline for ADL function and identify physical deficits which impact ability to perform ADLs (bathing, care of mouth/teeth, toileting, grooming, dressing, etc.)  - Assess/evaluate cause of self-care deficits   - Assess range of motion  - Assess patient's mobility; develop plan if impaired  - Assess patient's need for assistive devices and provide as appropriate  - Encourage maximum independence but intervene and supervise when necessary  - Involve family in performance of ADLs  - Assess for home care needs following discharge   - Consider OT consult to assist with ADL evaluation and planning for discharge  - Provide patient education as appropriate  Outcome: Progressing  Goal: Maintains/Returns to pre admission functional level  Description: INTERVENTIONS:  - Perform BMAT or MOVE assessment daily.   - Set and communicate daily mobility goal to care team and patient/family/caregiver. - Collaborate with rehabilitation services on mobility goals if consulted  - Perform Range of Motion 3 times a day. - Reposition patient every 2 hours.   - Dangle patient 3 times a day  - Stand patient 3 times a day  - Ambulate patient 3 times a day  - Out of bed to chair 3 times a day   - Out of bed for meals 3 times a day  - Out of bed for toileting  - Record patient progress and toleration of activity level   Outcome: Progressing     Problem: DISCHARGE PLANNING  Goal: Discharge to home or other facility with appropriate resources  Description: INTERVENTIONS:  - Identify barriers to discharge w/patient and caregiver  - Arrange for needed discharge resources and transportation as appropriate  - Identify discharge learning needs (meds, wound care, etc.)  - Arrange for interpretive services to assist at discharge as needed  - Refer to Case Management Department for coordinating discharge planning if the patient needs post-hospital services based on physician/advanced practitioner order or complex needs related to functional status, cognitive ability, or social support system  Outcome: Progressing     Problem: Knowledge Deficit  Goal: Patient/family/caregiver demonstrates understanding of disease process, treatment plan, medications, and discharge instructions  Description: Complete learning assessment and assess knowledge base. Interventions:  - Provide teaching at level of understanding  - Provide teaching via preferred learning methods  Outcome: Progressing     Problem: MOBILITY - ADULT  Goal: Maintain or return to baseline ADL function  Description: INTERVENTIONS:  -  Assess patient's ability to carry out ADLs; assess patient's baseline for ADL function and identify physical deficits which impact ability to perform ADLs (bathing, care of mouth/teeth, toileting, grooming, dressing, etc.)  - Assess/evaluate cause of self-care deficits   - Assess range of motion  - Assess patient's mobility; develop plan if impaired  - Assess patient's need for assistive devices and provide as appropriate  - Encourage maximum independence but intervene and supervise when necessary  - Involve family in performance of ADLs  - Assess for home care needs following discharge   - Consider OT consult to assist with ADL evaluation and planning for discharge  - Provide patient education as appropriate  Outcome: Progressing  Goal: Maintains/Returns to pre admission functional level  Description: INTERVENTIONS:  - Perform BMAT or MOVE assessment daily.   - Set and communicate daily mobility goal to care team and patient/family/caregiver.    - Collaborate with rehabilitation services on mobility goals if consulted  - Perform Range of Motion 3 times a day.  - Reposition patient every 2 hours.   - Dangle patient 3 times a day  - Stand patient 3 times a day  - Ambulate patient 3 times a day  - Out of bed to chair 3 times a day   - Out of bed for meals 3 times a day  - Out of bed for toileting  - Record patient progress and toleration of activity level   Outcome: Progressing

## 2023-11-01 ENCOUNTER — TELEPHONE (OUTPATIENT)
Dept: PODIATRY | Facility: CLINIC | Age: 48
End: 2023-11-01

## 2023-11-01 NOTE — TELEPHONE ENCOUNTER
Caller: Ian Palma    Doctor: Margot Jay, DPM    Reason for call: per Dr. Mireya Hamm, he wants [facility] seen as soon as possible as he wants to do surgery for her. [facility] can only do Thursday anytime after 12:00 or Monday, she can do anytime. She has transportation issues. The phone number is not hers. She does not have a phone right now but it is her neighbors. Please just make an appointment for her and leave a message for her neighbor.     Call back#: [phone]

## 2023-11-02 ENCOUNTER — OFFICE VISIT (OUTPATIENT)
Dept: PODIATRY | Facility: CLINIC | Age: 48
End: 2023-11-02
Payer: COMMERCIAL

## 2023-11-02 ENCOUNTER — TELEPHONE (OUTPATIENT)
Age: 48
End: 2023-11-02

## 2023-11-02 VITALS
SYSTOLIC BLOOD PRESSURE: 109 MMHG | BODY MASS INDEX: 27.66 KG/M2 | HEIGHT: 65 IN | HEART RATE: 123 BPM | DIASTOLIC BLOOD PRESSURE: 79 MMHG

## 2023-11-02 DIAGNOSIS — S92.344A CLOSED NONDISPLACED FRACTURE OF FOURTH METATARSAL BONE OF RIGHT FOOT, INITIAL ENCOUNTER: ICD-10-CM

## 2023-11-02 DIAGNOSIS — S92.321A CLOSED DISPLACED FRACTURE OF SECOND METATARSAL BONE OF RIGHT FOOT, INITIAL ENCOUNTER: ICD-10-CM

## 2023-11-02 DIAGNOSIS — S93.324A LISFRANC DISLOCATION, RIGHT, INITIAL ENCOUNTER: ICD-10-CM

## 2023-11-02 DIAGNOSIS — S92.331A CLOSED DISPLACED FRACTURE OF THIRD METATARSAL BONE OF RIGHT FOOT, INITIAL ENCOUNTER: ICD-10-CM

## 2023-11-02 DIAGNOSIS — S92.244A CLOSED NONDISPLACED FRACTURE OF MEDIAL CUNEIFORM OF RIGHT FOOT, INITIAL ENCOUNTER: ICD-10-CM

## 2023-11-02 DIAGNOSIS — S92.311A CLOSED DISPLACED FRACTURE OF FIRST METATARSAL BONE OF RIGHT FOOT, INITIAL ENCOUNTER: Primary | ICD-10-CM

## 2023-11-02 DIAGNOSIS — S92.351A CLOSED DISPLACED FRACTURE OF FIFTH METATARSAL BONE OF RIGHT FOOT, INITIAL ENCOUNTER: ICD-10-CM

## 2023-11-02 PROCEDURE — 99214 OFFICE O/P EST MOD 30 MIN: CPT | Performed by: PODIATRIST

## 2023-11-02 RX ORDER — CLINDAMYCIN PHOSPHATE 900 MG/50ML
900 INJECTION INTRAVENOUS ONCE
OUTPATIENT
Start: 2023-11-09 | End: 2023-11-02

## 2023-11-02 RX ORDER — CHLORHEXIDINE GLUCONATE 4 G/100ML
SOLUTION TOPICAL DAILY PRN
OUTPATIENT
Start: 2023-11-09

## 2023-11-02 RX ORDER — CHLORHEXIDINE GLUCONATE ORAL RINSE 1.2 MG/ML
15 SOLUTION DENTAL ONCE
OUTPATIENT
Start: 2023-11-09 | End: 2023-11-02

## 2023-11-02 NOTE — PROGRESS NOTES
Assessment/Plan:    No problem-specific Assessment & Plan notes found for this encounter. Diagnoses and all orders for this visit:    Closed displaced fracture of first metatarsal bone of right foot, initial encounter  -     Case request operating room: APPLICATION EXTERNAL FIXATION DEVICE LOWER EXTREMITY, OPEN REDUCTION W/ INTERNAL FIXATION (ORIF) FOOT; Standing  -     Basic metabolic panel; Future  -     CBC and Platelet; Future  -     Case request operating room: APPLICATION EXTERNAL FIXATION DEVICE LOWER EXTREMITY, OPEN REDUCTION W/ INTERNAL FIXATION (ORIF) FOOT    Lisfranc dislocation, right, initial encounter  -     Ambulatory Referral to Podiatry  -     Case request operating room: APPLICATION EXTERNAL FIXATION DEVICE LOWER EXTREMITY, OPEN REDUCTION W/ INTERNAL FIXATION (ORIF) FOOT; Standing  -     Basic metabolic panel; Future  -     CBC and Platelet; Future  -     Case request operating room: APPLICATION EXTERNAL FIXATION DEVICE LOWER EXTREMITY, OPEN REDUCTION W/ INTERNAL FIXATION (ORIF) FOOT    Closed displaced fracture of second metatarsal bone of right foot, initial encounter  -     Case request operating room: APPLICATION EXTERNAL FIXATION DEVICE LOWER EXTREMITY, OPEN REDUCTION W/ INTERNAL FIXATION (ORIF) FOOT; Standing  -     Basic metabolic panel; Future  -     CBC and Platelet; Future  -     Case request operating room: APPLICATION EXTERNAL FIXATION DEVICE LOWER EXTREMITY, OPEN REDUCTION W/ INTERNAL FIXATION (ORIF) FOOT    Closed displaced fracture of third metatarsal bone of right foot, initial encounter  -     Case request operating room: APPLICATION EXTERNAL FIXATION DEVICE LOWER EXTREMITY, OPEN REDUCTION W/ INTERNAL FIXATION (ORIF) FOOT; Standing  -     Basic metabolic panel; Future  -     CBC and Platelet;  Future  -     Case request operating room: APPLICATION EXTERNAL FIXATION DEVICE LOWER EXTREMITY, OPEN REDUCTION W/ INTERNAL FIXATION (ORIF) FOOT    Closed nondisplaced fracture of medial cuneiform of right foot, initial encounter  -     Case request operating room: APPLICATION EXTERNAL FIXATION DEVICE LOWER EXTREMITY, OPEN REDUCTION W/ INTERNAL FIXATION (ORIF) FOOT; Standing  -     Basic metabolic panel; Future  -     CBC and Platelet; Future  -     Case request operating room: APPLICATION EXTERNAL FIXATION DEVICE LOWER EXTREMITY, OPEN REDUCTION W/ INTERNAL FIXATION (ORIF) FOOT    Closed nondisplaced fracture of fourth metatarsal bone of right foot, initial encounter  -     Case request operating room: APPLICATION EXTERNAL FIXATION DEVICE LOWER EXTREMITY, OPEN REDUCTION W/ INTERNAL FIXATION (ORIF) FOOT; Standing  -     Basic metabolic panel; Future  -     CBC and Platelet; Future  -     Case request operating room: APPLICATION EXTERNAL FIXATION DEVICE LOWER EXTREMITY, OPEN REDUCTION W/ INTERNAL FIXATION (ORIF) FOOT    Closed displaced fracture of fifth metatarsal bone of right foot, initial encounter  -     Case request operating room: APPLICATION EXTERNAL FIXATION DEVICE LOWER EXTREMITY, OPEN REDUCTION W/ INTERNAL FIXATION (ORIF) FOOT; Standing  -     Basic metabolic panel; Future  -     CBC and Platelet; Future  -     Case request operating room: APPLICATION EXTERNAL FIXATION DEVICE LOWER EXTREMITY, OPEN REDUCTION W/ INTERNAL FIXATION (ORIF) FOOT    Other orders  -     Nursing Communication [address] [place] [address] Interventions Implemented; Standing  -     Nursing Communication [place] [facility] bath, have staff wash entire body (neck down) per pre-op bathing protocol. Routine, evening prior to, and day of surgery.; Standing  -     Nursing Communication Swab both nares with Povidone-Iodine solution, EXCLUDE if patient has shellfish/Iodine allergy, and replace with nasal alcohol swabstick. Routine, day of surgery, on call to OR; Standing  -     chlorhexidine (PERIDEX) 0.12 % oral rinse 15 mL  -     Void on call to OR; Standing  -     Insert peripheral IV;  Standing  -     Diet NPO; Sips with meds; Standing  -     clindamycin (CLEOCIN) 900 mg in sodium chloride 0.9 % 50 mL IVPB  -     chlorhexidine (HIBICLENS) 4 % topical liquid      -X-rays 3 views were reviewed and CT was as well, there are 6 total fractured bones and 7 total fractures in the foot  - At this point we will stay procedures  - We will plan for medial application on the right foot with strict nonweightbearing for 6 days postsurgically, we will also plan for relocation of the severely dislocated metatarsal heads 2 and 3, hopefully with the self  This will help with reduction and we will be able to percutaneously pin the lesser metatarsal fractures if not we will open and percutaneously pinned versus plate  - We did initially discuss nicotine use and we continue to advise cessation  - We will plan for operative intervention on the neck, we did discuss that she will have a popliteal block  - She will be strict nonweightbearing for least 6 weeks postsurgically  - We will then plan for removal and trial of weightbearing, if this area does begin to gap especially in the intercuneiform area of the Lisfranc ligament and have continued severe pain we will likely need to perform delayed secondary stage. Primary fusion    Patient was counseled and educated on the condition and the diagnosis. The diagnosis, treatment options and prognosis were discussed with the patient. The patient failed conservative care at this time and wished to proceed with surgical treatment. Explained surgical details and post-op course. Discussed all risks and complications related to the patient's condition and surgery. The benefits of surgery were also discussed. The patient understood that the surgery would not guarantee desirable outcome. All questions and concerns were addressed and the consent was signed. Subjective:      Patient ID: Cecilia George is a 50 y.o. female.     Patient presents for evaluation management of her right foot, she is being very strict nonweightbearing with use of the cam boot elevating icing and doing all of the care at home as directed. She suffered a severe fall  at home when she was walking and she passed out. She woke up on the floor try to get up and felt a crunch        The following portions of the patient's history were reviewed and updated as appropriate: allergies, current medications, past family history, past medical history, past social history, past surgical history, and problem list.    Review of Systems   Constitutional:  Negative for chills and fever. HENT:  Negative for ear pain and sore throat. Eyes:  Negative for pain and visual disturbance. Respiratory:  Negative for cough and shortness of breath. Cardiovascular:  Negative for chest pain and palpitations. Gastrointestinal:  Negative for abdominal pain and vomiting. Genitourinary:  Negative for dysuria and hematuria. Musculoskeletal:  Negative for arthralgias and back pain. Skin:  Negative for color change and rash. Neurological:  Negative for seizures and syncope. All other systems reviewed and are negative.         Objective:      /79 (BP Location: Right arm, Patient Position: Sitting, Cuff Size: Standard)   Pulse (!) 123   Ht 5' 5" (1.651 m) Comment: verbal  LMP  (LMP Unknown)   BMI 27.66 kg/m²          Physical Exam  Musculoskeletal:      Comments: Digital range of motion of the ankle intact, there is severe ecchymosis on the medial aspect also plantar foot, there is significant soft tissue swelling, and with palpation on the affected areas of the foot, neurovascular status is intact

## 2023-11-02 NOTE — TELEPHONE ENCOUNTER
Caller: Paige Dick    Doctor: Dr. Cailin Hernandez    Reason for call: sent message yesterday about being seen ASAP/Per Dr Long/I got her in today and CX the one for next week.     Call back#: [phone]

## 2023-11-03 ENCOUNTER — PREP FOR PROCEDURE (OUTPATIENT)
Dept: PAIN MEDICINE | Facility: CLINIC | Age: 48
End: 2023-11-03

## 2023-11-06 ENCOUNTER — OFFICE VISIT (OUTPATIENT)
Dept: FAMILY MEDICINE CLINIC | Facility: CLINIC | Age: 48
End: 2023-11-06
Payer: COMMERCIAL

## 2023-11-06 VITALS
WEIGHT: 171 LBS | OXYGEN SATURATION: 96 % | HEIGHT: 65 IN | SYSTOLIC BLOOD PRESSURE: 100 MMHG | BODY MASS INDEX: 28.49 KG/M2 | TEMPERATURE: 99.6 F | HEART RATE: 101 BPM | DIASTOLIC BLOOD PRESSURE: 64 MMHG

## 2023-11-06 DIAGNOSIS — C81.92 HODGKIN LYMPHOMA OF INTRATHORACIC LYMPH NODES, UNSPECIFIED HODGKIN LYMPHOMA TYPE (HCC): ICD-10-CM

## 2023-11-06 DIAGNOSIS — Z11.59 NEED FOR HEPATITIS C SCREENING TEST: ICD-10-CM

## 2023-11-06 DIAGNOSIS — Z12.4 SCREENING FOR CERVICAL CANCER: ICD-10-CM

## 2023-11-06 DIAGNOSIS — Z23 ENCOUNTER FOR IMMUNIZATION: ICD-10-CM

## 2023-11-06 DIAGNOSIS — Z11.4 SCREENING FOR HIV (HUMAN IMMUNODEFICIENCY VIRUS): ICD-10-CM

## 2023-11-06 DIAGNOSIS — Z76.89 ENCOUNTER TO ESTABLISH CARE WITH NEW DOCTOR: Primary | ICD-10-CM

## 2023-11-06 DIAGNOSIS — Z72.0 TOBACCO ABUSE DISORDER: ICD-10-CM

## 2023-11-06 DIAGNOSIS — Z12.31 ENCOUNTER FOR SCREENING MAMMOGRAM FOR BREAST CANCER: ICD-10-CM

## 2023-11-06 DIAGNOSIS — S92.901D CLOSED FRACTURE OF RIGHT FOOT WITH ROUTINE HEALING: ICD-10-CM

## 2023-11-06 LAB
25(OH)D2 SERPL-MCNC: <1 NG/ML
25(OH)D3 SERPL-MCNC: 15 NG/ML
25(OH)D3+25(OH)D2 SERPL-MCNC: 15 NG/ML

## 2023-11-06 PROCEDURE — 99204 OFFICE O/P NEW MOD 45 MIN: CPT

## 2023-11-06 RX ORDER — OXYCODONE HCL 10 MG/1
10 TABLET, FILM COATED, EXTENDED RELEASE ORAL EVERY 6 HOURS
COMMUNITY
End: 2023-11-09 | Stop reason: HOSPADM

## 2023-11-06 NOTE — PROGRESS NOTES
Name: Shaheed Leonardo      : 1975      MRN: [id number]  Encounter Provider: ANNE Elena  Encounter Date: 2023   Encounter department: [address] [place] [address] 60     1. Encounter to establish care with new doctor    2. Hodgkin lymphoma of intrathoracic lymph nodes, unspecified Hodgkin lymphoma type [facility])  Comments:  Patient reports remission as of . 3. Closed fracture of right foot with routine healing    4. Need for hepatitis C screening test  -     Hepatitis C Antibody; Future    5. Encounter for immunization  -     Pneumococcal Conjugate Vaccine 20-valent (PCV20)  -     TDAP VACCINE GREATER THAN OR EQUAL TO 8YO IM  -     influenza vaccine, quadrivalent, 0.5 mL, preservative-free, for adult and pediatric patients 6 mos+ (AFLURIA, FLUARIX, FLULAVAL, FLUZONE)    6. Screening for HIV (human immunodeficiency virus)  -     HIV 1/2 AG/AB w Reflex [facility] for 2 yr old and above; Future    7. Screening for cervical cancer    8. Encounter for screening mammogram for breast cancer  -     Mammo screening bilateral w 3d & cad; Future; Expected date: 11/10/2023    9. Tobacco abuse disorder    Repeat labs to check for resolution of white count & hypokalemia. Schedule pre-op appointment: DOS 2023    BMI Counseling: Body mass index is 28.46 kg/m². The BMI is above normal. Nutrition recommendations include decreasing portion sizes, encouraging healthy choices of fruits and vegetables, decreasing fast food intake, consuming healthier snacks, limiting drinks that contain sugar, moderation in carbohydrate intake, increasing intake of lean protein, reducing intake of saturated and trans fat and reducing intake of cholesterol. Exercise recommendations include moderate physical activity 150 minutes/week and exercising 3-5 times per week. No pharmacotherapy was ordered. Rationale for BMI follow-up plan is due to patient being overweight or obese.      Depression Screening and Follow-up Plan: Patient was screened for depression during today's encounter. They screened negative with a PHQ-2 score of 0. Tobacco Cessation Counseling: Tobacco cessation counseling was provided. The patient is sincerely urged to quit consumption of tobacco. She is not ready to quit tobacco. Medication options and side effects of medication discussed. Patient refused medication. Subjective     Edwin Howell presents in office today to establish care. Her PMH includes Hodgkin lymphoma with remission as of 2009 and tobacco abuse. Review of Systems   Constitutional:  Negative for chills, fatigue and fever. HENT:  Negative for congestion, ear pain, facial swelling, hearing loss, rhinorrhea, sinus pressure, sinus pain, sneezing, sore throat and trouble swallowing. Eyes:  Negative for photophobia, pain, redness and visual disturbance. Respiratory:  Negative for cough, chest tightness, shortness of breath and wheezing. Cardiovascular:  Negative for chest pain, palpitations and leg swelling. Gastrointestinal:  Negative for abdominal pain, diarrhea, nausea and vomiting. Genitourinary:  Negative for dysuria, flank pain, hematuria and pelvic pain. Musculoskeletal:  Negative for arthralgias, back pain and myalgias. Skin:  Negative for color change, pallor and rash. Neurological:  Negative for dizziness, seizures, syncope, weakness, light-headedness and headaches. Psychiatric/Behavioral:  Negative for confusion, hallucinations and sleep disturbance. The patient is not nervous/anxious. All other systems reviewed and are negative.       Current Outpatient Medications on File Prior to Visit   Medication Sig    acetaminophen (TYLENOL) 325 mg tablet Take 3 tablets (975 mg total) by mouth every 8 (eight) hours    oxyCODONE (OxyCONTIN) 10 mg 12 hr tablet Take 10 mg by mouth every 6 (six) hours       Objective     /64 (BP Location: Left arm, Patient Position: Sitting)   Pulse 101   Temp 99.6 °F (37.6 °C) (Tympanic)   Ht 5' 5" (1.651 m)   Wt 77.6 kg (171 lb)   LMP  (LMP Unknown)   SpO2 96%   BMI 28.46 kg/m²     Physical Exam  Vitals reviewed. Constitutional:       General: She is awake. She is not in acute distress. Appearance: Normal appearance. She is well-developed. She is not toxic-appearing. HENT:      Head: Normocephalic. Jaw: There is normal jaw occlusion. Right Ear: Tympanic membrane normal. Tympanic membrane is not erythematous or bulging. Left Ear: Tympanic membrane normal. Tympanic membrane is not erythematous or bulging. Nose: No congestion or rhinorrhea. Right Sinus: No maxillary sinus tenderness or frontal sinus tenderness. Left Sinus: No maxillary sinus tenderness or frontal sinus tenderness. Mouth/Throat:      Pharynx: Uvula midline. No oropharyngeal exudate, posterior oropharyngeal erythema or uvula swelling. Tonsils: No tonsillar exudate or tonsillar abscesses. Eyes:      Extraocular Movements: Extraocular movements intact. Conjunctiva/sclera: Conjunctivae normal.      Pupils: Pupils are equal, round, and reactive to light. Neck:      Thyroid: No thyroid mass. Vascular: No JVD. Trachea: Trachea and phonation normal.   Cardiovascular:      Rate and Rhythm: Normal rate and regular rhythm. Pulses: Normal pulses. Heart sounds: Normal heart sounds. Pulmonary:      Effort: Pulmonary effort is normal. No tachypnea or respiratory distress. Breath sounds: Normal breath sounds and air entry. No decreased breath sounds, wheezing, rhonchi or rales. Chest:      Chest wall: No tenderness. Abdominal:      General: Bowel sounds are normal. There is no distension. Palpations: Abdomen is soft. Tenderness: There is no abdominal tenderness. There is no right CVA tenderness, left CVA tenderness, guarding or rebound. Musculoskeletal:         General: Normal range of motion.       Cervical back: Normal range of motion. Right lower leg: No edema. Left lower leg: No edema. Comments:   CAM boot in place  +walker   Lymphadenopathy:      Cervical: No cervical adenopathy. Skin:     General: Skin is warm and dry. Findings: No rash. Neurological:      General: No focal deficit present. Mental Status: She is alert and oriented to person, place, and time. Sensory: Sensation is intact. Motor: Motor function is intact. Coordination: Coordination is intact. Gait: Gait is intact. Deep Tendon Reflexes: Reflexes are normal and symmetric. Psychiatric:         Attention and Perception: Attention normal.         Mood and Affect: Mood normal.         Speech: Speech normal.         Behavior: Behavior normal. Behavior is cooperative.          Cognition and Memory: Cognition normal.       ANNE Blanco

## 2023-11-07 ENCOUNTER — CONSULT (OUTPATIENT)
Dept: FAMILY MEDICINE CLINIC | Facility: CLINIC | Age: 48
End: 2023-11-07
Payer: COMMERCIAL

## 2023-11-07 ENCOUNTER — LAB (OUTPATIENT)
Dept: LAB | Facility: HOSPITAL | Age: 48
End: 2023-11-07
Attending: PODIATRIST
Payer: COMMERCIAL

## 2023-11-07 VITALS
WEIGHT: 171.4 LBS | OXYGEN SATURATION: 97 % | TEMPERATURE: 99.8 F | BODY MASS INDEX: 28.56 KG/M2 | DIASTOLIC BLOOD PRESSURE: 64 MMHG | HEIGHT: 65 IN | SYSTOLIC BLOOD PRESSURE: 108 MMHG | HEART RATE: 106 BPM

## 2023-11-07 DIAGNOSIS — S92.344A CLOSED NONDISPLACED FRACTURE OF FOURTH METATARSAL BONE OF RIGHT FOOT, INITIAL ENCOUNTER: ICD-10-CM

## 2023-11-07 DIAGNOSIS — S93.324A LISFRANC DISLOCATION, RIGHT, INITIAL ENCOUNTER: ICD-10-CM

## 2023-11-07 DIAGNOSIS — Z11.4 SCREENING FOR HIV (HUMAN IMMUNODEFICIENCY VIRUS): ICD-10-CM

## 2023-11-07 DIAGNOSIS — S92.331A CLOSED DISPLACED FRACTURE OF THIRD METATARSAL BONE OF RIGHT FOOT, INITIAL ENCOUNTER: ICD-10-CM

## 2023-11-07 DIAGNOSIS — S92.321A CLOSED DISPLACED FRACTURE OF SECOND METATARSAL BONE OF RIGHT FOOT, INITIAL ENCOUNTER: ICD-10-CM

## 2023-11-07 DIAGNOSIS — Z12.39 ENCOUNTER FOR SCREENING FOR MALIGNANT NEOPLASM OF BREAST, UNSPECIFIED SCREENING MODALITY: Primary | ICD-10-CM

## 2023-11-07 DIAGNOSIS — S92.244A CLOSED NONDISPLACED FRACTURE OF MEDIAL CUNEIFORM OF RIGHT FOOT, INITIAL ENCOUNTER: ICD-10-CM

## 2023-11-07 DIAGNOSIS — E55.9 VITAMIN D DEFICIENCY: ICD-10-CM

## 2023-11-07 DIAGNOSIS — S92.311A CLOSED DISPLACED FRACTURE OF FIRST METATARSAL BONE OF RIGHT FOOT, INITIAL ENCOUNTER: ICD-10-CM

## 2023-11-07 DIAGNOSIS — E87.6 HYPOKALEMIA: ICD-10-CM

## 2023-11-07 DIAGNOSIS — S92.351A CLOSED DISPLACED FRACTURE OF FIFTH METATARSAL BONE OF RIGHT FOOT, INITIAL ENCOUNTER: ICD-10-CM

## 2023-11-07 DIAGNOSIS — Z28.21 IMMUNIZATION DECLINED: ICD-10-CM

## 2023-11-07 DIAGNOSIS — S92.901D CLOSED FRACTURE OF RIGHT FOOT WITH ROUTINE HEALING: ICD-10-CM

## 2023-11-07 LAB
ALBUMIN SERPL BCP-MCNC: 4.1 G/DL (ref 3.5–5)
ALP SERPL-CCNC: 51 U/L (ref 34–104)
ALT SERPL W P-5'-P-CCNC: 14 U/L (ref 7–52)
ANION GAP SERPL CALCULATED.3IONS-SCNC: 9 MMOL/L
AST SERPL W P-5'-P-CCNC: 13 U/L (ref 13–39)
BILIRUB SERPL-MCNC: 0.41 MG/DL (ref 0.2–1)
BUN SERPL-MCNC: 9 MG/DL (ref 5–25)
CALCIUM SERPL-MCNC: 9.3 MG/DL (ref 8.4–10.2)
CHLORIDE SERPL-SCNC: 104 MMOL/L (ref 96–108)
CO2 SERPL-SCNC: 27 MMOL/L (ref 21–32)
CREAT SERPL-MCNC: 0.55 MG/DL (ref 0.6–1.3)
ERYTHROCYTE [DISTWIDTH] IN BLOOD BY AUTOMATED COUNT: 13.7 % (ref 11.6–15.1)
GFR SERPL CREATININE-BSD FRML MDRD: 111 ML/MIN/1.73SQ M
GLUCOSE SERPL-MCNC: 89 MG/DL (ref 65–140)
HCT VFR BLD AUTO: 37.6 % (ref 34.8–46.1)
HGB BLD-MCNC: 12.5 G/DL (ref 11.5–15.4)
IMM EOSINOPHIL NFR BLD MANUAL: 6 % (ref 0–6)
LG PLATELETS BLD QL SMEAR: PRESENT
LYMPHOCYTES NFR BLD: 39 % (ref 14–44)
MCH RBC QN AUTO: 31.9 PG (ref 26.8–34.3)
MCHC RBC AUTO-ENTMCNC: 33.2 G/DL (ref 31.4–37.4)
MCV RBC AUTO: 96 FL (ref 82–98)
MONOCYTES NFR BLD AUTO: 7 % (ref 4–12)
NEUTS SEG NFR BLD AUTO: 40 % (ref 45–77)
PLATELET # BLD AUTO: 272 THOUSANDS/UL (ref 149–390)
PMV BLD AUTO: 10.7 FL (ref 8.9–12.7)
POTASSIUM SERPL-SCNC: 3.1 MMOL/L (ref 3.5–5.3)
PROT SERPL-MCNC: 6.5 G/DL (ref 6.4–8.4)
RBC # BLD AUTO: 3.92 MILLION/UL (ref 3.81–5.12)
SODIUM SERPL-SCNC: 140 MMOL/L (ref 135–147)
STOMATOCYTES BLD QL SMEAR: PRESENT
TOTAL CELLS COUNTED SPEC: 100
TSH SERPL DL<=0.05 MIU/L-ACNC: 2.45 UIU/ML (ref 0.45–4.5)
VARIANT LYMPHS BLD QL SMEAR: 8 % (ref 0–0)
WBC # BLD AUTO: 7.13 THOUSAND/UL (ref 4.31–10.16)

## 2023-11-07 PROCEDURE — 84443 ASSAY THYROID STIM HORMONE: CPT

## 2023-11-07 PROCEDURE — 85007 BL SMEAR W/DIFF WBC COUNT: CPT

## 2023-11-07 PROCEDURE — 87389 HIV-1 AG W/HIV-1&-2 AB AG IA: CPT

## 2023-11-07 PROCEDURE — 85027 COMPLETE CBC AUTOMATED: CPT

## 2023-11-07 PROCEDURE — 99214 OFFICE O/P EST MOD 30 MIN: CPT | Performed by: FAMILY MEDICINE

## 2023-11-07 PROCEDURE — 80053 COMPREHEN METABOLIC PANEL: CPT

## 2023-11-07 PROCEDURE — 36415 COLL VENOUS BLD VENIPUNCTURE: CPT

## 2023-11-07 RX ORDER — B-COMPLEX WITH VITAMIN C
1 TABLET ORAL
Qty: 90 TABLET | Refills: 1 | Status: SHIPPED | OUTPATIENT
Start: 2023-11-07 | End: 2024-02-05

## 2023-11-07 NOTE — ASSESSMENT & PLAN NOTE
CT scan10/30/23: Medial cuneiform and first through fifth metatarsal fractures. Following up with podiatry; last visit 11/2/23   With plan for the following procedure:   APPLICATION EXTERNAL FIXATION DEVICE LOWER EXTREMITY, OPEN REDUCTION W/ INTERNAL FIXATION (ORIF) FOOT     Labs reviewed today; CBC stable. BMP: mild hypokalemia  EKG per ED on 10-23 unable to review images however per documentation from ED provider, NSR, no acute changes were noted  Medications reviewed: no further recommendations    Plan:   Given the acuity of the condition and the need for surgical intervention we agree with the performing the mentioned surgery by the specialist as listed above  Patient is at low intermediate risk for the procedure given her PMH, Smoking history and the type of procedure. Pending lab work to be completed in the next 24 hrs however patient needs the surgery. Should MERARI or anemia show up on updated blood work we will notify the performing surgeon Dr. Antoinette Fierro for further precautions and possible admission.

## 2023-11-07 NOTE — PROGRESS NOTES
Assessment/Plan:    Closed fracture of right foot with routine healing  CT scan10/30/23: Medial cuneiform and first through fifth metatarsal fractures. Following up with podiatry; last visit 11/2/23   With plan for the following procedure:   APPLICATION EXTERNAL FIXATION DEVICE LOWER EXTREMITY, OPEN REDUCTION W/ INTERNAL FIXATION (ORIF) FOOT     Labs reviewed today; CBC stable. BMP: mild hypokalemia  EKG per ED on 10-23 unable to review images however per documentation from ED provider, NSR, no acute changes were noted  Medications reviewed: no further recommendations    Plan:   Given the acuity of the condition and the need for surgical intervention we agree with the performing the mentioned surgery by the specialist as listed above  Patient is at low intermediate risk for the procedure given her PMH, Smoking history and the type of procedure. Pending lab work to be completed in the next 24 hrs however patient needs the surgery. Should MERARI or anemia show up on updated blood work we will notify the performing surgeon Dr. Gary Sneed for further precautions and possible admission. Diagnoses and all orders for this visit:    Encounter for screening for malignant neoplasm of breast, unspecified screening modality  -     Mammo screening bilateral w 3d & cad; Future    Screening for HIV (human immunodeficiency virus)  -     HIV 1/2 AG/AB w Reflex [facility] for 2 yr old and above; Future  -     Mammo screening bilateral w 3d & cad; Future    Vitamin D deficiency  Comments:  untreated. start 50,000 weekly, cholecalciferol x 8 wks  take ca-vitamin D for 6 months  Orders:  -     calcium carbonate-vitamin D 500 mg-5 mcg per tablet; Take 1 tablet by mouth daily with breakfast  -     Cholecalciferol 1.25 MG (77742 UT) capsule;  Take 1 capsule (50,000 Units total) by mouth once a week for 8 doses    Closed fracture of right foot with routine healing    Hypokalemia  Comments:  mild per labs from 10-31-23  repeat labs pending  Orders:  -     Comprehensive metabolic panel; Future  -     Peripheral Smear; Future  -     TSH, 3rd generation with Free T4 reflex; Future    Immunization declined  Comments:  health risks explained    Other orders  -     Cancel: Pneumococcal Conjugate Vaccine 20-valent (PCV20)  -     Cancel: TDAP VACCINE GREATER THAN OR EQUAL TO 8YO IM  -     Cancel: influenza vaccine, quadrivalent, 0.5 mL, preservative-free, for adult and pediatric patients 6 mos+ (AFLURIA, FLUARIX, FLULAVAL, FLUZONE)          PHQ-2/9 Depression Screening                Subjective:      Patient ID: Jes Mcdermott is a 50 y.o. female. 50years old female with a PMH remarkable for Chronic nicotine dependance ( current smoker) presents to the office for a follow up visit and in preparation for her upcoming RT ankle/foot surgery on 11/9/23 for ORIF due to metatarsal fx . Patient continues to complain of RT foot pain, sharp in character, involves the foot diffusely, doesn't radiate, no associated tingling/ numbness or weakness. She is using a cam boot all the time. Compliant  She continues to actively smoke ciggaretes   She denies alcohol intake    Otherwise ROS negative for chest pain or tightness, SOB or cough, dizziness or light headedness, N/V, Diarrhea or constipation. No active urinary symptoms  Tolerating oral diet. The following portions of the patient's history were reviewed and updated as appropriate: allergies, current medications, past family history, past medical history, past social history, past surgical history, and problem list.    Review of Systems   Constitutional:  Negative for chills, fatigue and fever. HENT: Negative. Negative for hearing loss. Eyes: Negative. Negative for visual disturbance. Respiratory:  Negative for shortness of breath and wheezing. Cardiovascular:  Negative for chest pain and palpitations.    Gastrointestinal:  Negative for abdominal pain, blood in stool, constipation, diarrhea, nausea and vomiting. Endocrine: Negative. Genitourinary:  Negative for difficulty urinating and dysuria. Musculoskeletal:  Positive for arthralgias and gait problem. Negative for myalgias. Skin: Negative. Allergic/Immunologic: Negative. Neurological:  Negative for seizures and syncope. Hematological:  Negative for adenopathy. Psychiatric/Behavioral: Negative. Objective:    /64 (BP Location: Left arm, Patient Position: Sitting)   Pulse (!) 106   Temp 99.8 °F (37.7 °C) (Tympanic)   Ht 5' 5" (1.651 m)   Wt 77.7 kg (171 lb 6.4 oz)   LMP  (LMP Unknown)   SpO2 97%   BMI 28.52 kg/m²      Physical Exam  Vitals and nursing note reviewed. Constitutional:       General: She is not in acute distress. Appearance: Normal appearance. She is not ill-appearing or diaphoretic. HENT:      Head: Normocephalic and atraumatic. Eyes:      Conjunctiva/sclera: Conjunctivae normal.   Cardiovascular:      Rate and Rhythm: Normal rate and regular rhythm. Heart sounds: Normal heart sounds. No murmur heard. Pulmonary:      Effort: Pulmonary effort is normal. No respiratory distress. Breath sounds: Normal breath sounds. No wheezing, rhonchi or rales. Abdominal:      General: There is no distension. Palpations: Abdomen is soft. There is no mass. Tenderness: There is no abdominal tenderness. There is no guarding or rebound. Musculoskeletal:      Cervical back: Normal range of motion and neck supple. No rigidity. Left lower leg: No edema. Feet:      Comments: Cam boot noted in place, RT LE. Otherwise no abnormalities noted  Lymphadenopathy:      Cervical: No cervical adenopathy. Neurological:      Mental Status: She is alert and oriented to person, place, and time. Psychiatric:         Mood and Affect: Mood normal.         Behavior: Behavior normal.         Thought Content:  Thought content normal.         Judgment: Judgment normal.

## 2023-11-08 ENCOUNTER — ANESTHESIA EVENT (OUTPATIENT)
Dept: PERIOP | Facility: HOSPITAL | Age: 48
End: 2023-11-08
Payer: COMMERCIAL

## 2023-11-08 ENCOUNTER — PATIENT OUTREACH (OUTPATIENT)
Dept: OTHER | Facility: CLINIC | Age: 48
End: 2023-11-08

## 2023-11-08 ENCOUNTER — TELEPHONE (OUTPATIENT)
Dept: FAMILY MEDICINE CLINIC | Facility: CLINIC | Age: 48
End: 2023-11-08

## 2023-11-08 LAB
HIV 1+2 AB+HIV1 P24 AG SERPL QL IA: NORMAL
HIV 2 AB SERPL QL IA: NORMAL
HIV1 AB SERPL QL IA: NORMAL
HIV1 P24 AG SERPL QL IA: NORMAL

## 2023-11-08 NOTE — PRE-PROCEDURE INSTRUCTIONS
Pre-Surgery Instructions:   Medication Instructions    acetaminophen (TYLENOL) 325 mg tablet Uses PRN- OK to take day of surgery    oxyCODONE (OxyCONTIN) 10 mg 12 hr tablet Uses PRN- OK to take day of surgery   Medication instructions for day surgery reviewed. Please use only a sip of water to take your instructed medications. Avoid all over the counter vitamins, supplements and NSAIDS for one week prior to surgery per anesthesia guidelines. Tylenol is ok to take as needed. You will receive a call one business day prior to surgery with an arrival time and hospital directions. If your surgery is scheduled on a Monday, the hospital will be calling you on the Friday prior to your surgery. If you have not heard from anyone by 8pm, please call the hospital supervisor through the hospital  at [phone]. Abimael Powers [phone]). Do not eat or drink anything after midnight the night before your surgery, including candy, mints, lifesavers, or chewing gum. Do not drink alcohol 24hrs before your surgery. Try not to smoke at least 24hrs before your surgery. Follow the pre surgery showering instructions as listed in the [facility] Surgical Experience Booklet” or otherwise provided by your surgeon's office. Do not use a blade to shave the surgical area 1 week before surgery. It is okay to use a clean electric clippers up to 24 hours before surgery. Do not apply any lotions, creams, including makeup, cologne, deodorant, or perfumes after showering on the day of your surgery. Do not use dry shampoo, hair spray, hair gel, or any type of hair products. No contact lenses, eye make-up, or artificial eyelashes. Remove nail polish, including gel polish, and any artificial, gel, or acrylic nails if possible. Remove all jewelry including rings and body piercing jewelry. Wear causal clothing that is easy to take on and off. Consider your type of surgery. Keep any valuables, jewelry, piercings at home.  Please bring any specially ordered equipment (sling, braces) if indicated. Arrange for a responsible person to drive you to and from the hospital on the day of your surgery. Visitor Guidelines discussed. Call the surgeon's office with any new illnesses, exposures, or additional questions prior to surgery. Please reference your [facility] Surgical Experience Booklet” for additional information to prepare for your upcoming surgery.

## 2023-11-08 NOTE — TELEPHONE ENCOUNTER
Labs resulted  Patient is ok to proceed with the mentioned surgery per podiatry recommendations.    Low intermediate risk for the procedure   We will continue to follow up from a PCP standpoint

## 2023-11-09 ENCOUNTER — APPOINTMENT (OUTPATIENT)
Dept: RADIOLOGY | Facility: HOSPITAL | Age: 48
End: 2023-11-09
Payer: COMMERCIAL

## 2023-11-09 ENCOUNTER — ANESTHESIA (OUTPATIENT)
Dept: PERIOP | Facility: HOSPITAL | Age: 48
End: 2023-11-09
Payer: COMMERCIAL

## 2023-11-09 ENCOUNTER — HOSPITAL ENCOUNTER (OUTPATIENT)
Facility: HOSPITAL | Age: 48
Setting detail: OUTPATIENT SURGERY
Discharge: HOME | End: 2023-11-09
Attending: PODIATRIST | Admitting: PODIATRIST
Payer: COMMERCIAL

## 2023-11-09 VITALS
WEIGHT: 171 LBS | DIASTOLIC BLOOD PRESSURE: 79 MMHG | BODY MASS INDEX: 28.49 KG/M2 | TEMPERATURE: 97.8 F | RESPIRATION RATE: 18 BRPM | HEIGHT: 65 IN | SYSTOLIC BLOOD PRESSURE: 139 MMHG | OXYGEN SATURATION: 94 % | HEART RATE: 106 BPM

## 2023-11-09 DIAGNOSIS — T65.291A TOXIC EFFECT OF TOBACCO AND NICOTINE, ACCIDENTAL OR UNINTENTIONAL, INITIAL ENCOUNTER: Primary | ICD-10-CM

## 2023-11-09 DIAGNOSIS — Z98.890 POST-OPERATIVE STATE: ICD-10-CM

## 2023-11-09 PROBLEM — F17.200 CURRENT EVERY DAY SMOKER: Status: ACTIVE | Noted: 2023-11-09

## 2023-11-09 PROBLEM — C81.90 HODGKIN'S LYMPHOMA (HCC): Status: ACTIVE | Noted: 2023-11-09

## 2023-11-09 PROCEDURE — 20690 APPL UNIPLN UNI EXT FIXJ SYS: CPT | Performed by: PODIATRIST

## 2023-11-09 PROCEDURE — C1713 ANCHOR/SCREW BN/BN,TIS/BN: HCPCS | Performed by: PODIATRIST

## 2023-11-09 PROCEDURE — NC001 PR NO CHARGE: Performed by: STUDENT IN AN ORGANIZED HEALTH CARE EDUCATION/TRAINING PROGRAM

## 2023-11-09 PROCEDURE — 73620 X-RAY EXAM OF FOOT: CPT

## 2023-11-09 PROCEDURE — 99024 POSTOP FOLLOW-UP VISIT: CPT | Performed by: PODIATRIST

## 2023-11-09 PROCEDURE — 73630 X-RAY EXAM OF FOOT: CPT

## 2023-11-09 PROCEDURE — C9290 INJ, BUPIVACAINE LIPOSOME: HCPCS | Performed by: STUDENT IN AN ORGANIZED HEALTH CARE EDUCATION/TRAINING PROGRAM

## 2023-11-09 PROCEDURE — 28485 OPTX METATARSAL FX EACH: CPT | Performed by: PODIATRIST

## 2023-11-09 PROCEDURE — 28476 PRQ SKEL FIX METAR FX W/MNPJ: CPT | Performed by: PODIATRIST

## 2023-11-09 DEVICE — HALF PIN
Type: IMPLANTABLE DEVICE | Site: FOOT | Status: FUNCTIONAL
Brand: CORETRAK

## 2023-11-09 DEVICE — KIRSCHNER WIRE: Type: IMPLANTABLE DEVICE | Site: FOOT | Status: FUNCTIONAL

## 2023-11-09 DEVICE — TYPE D
Type: IMPLANTABLE DEVICE | Site: FOOT | Status: FUNCTIONAL
Brand: SIDEKICK CORETRAK

## 2023-11-09 RX ORDER — PROPOFOL 10 MG/ML
INJECTION, EMULSION INTRAVENOUS AS NEEDED
Status: DISCONTINUED | OUTPATIENT
Start: 2023-11-09 | End: 2023-11-09

## 2023-11-09 RX ORDER — OXYCODONE HYDROCHLORIDE AND ACETAMINOPHEN 5; 325 MG/1; MG/1
1 TABLET ORAL ONCE AS NEEDED
Status: COMPLETED | OUTPATIENT
Start: 2023-11-09 | End: 2023-11-09

## 2023-11-09 RX ORDER — ONDANSETRON 2 MG/ML
INJECTION INTRAMUSCULAR; INTRAVENOUS AS NEEDED
Status: DISCONTINUED | OUTPATIENT
Start: 2023-11-09 | End: 2023-11-09

## 2023-11-09 RX ORDER — SODIUM CHLORIDE, SODIUM LACTATE, POTASSIUM CHLORIDE, CALCIUM CHLORIDE 600; 310; 30; 20 MG/100ML; MG/100ML; MG/100ML; MG/100ML
125 INJECTION, SOLUTION INTRAVENOUS CONTINUOUS
Status: DISCONTINUED | OUTPATIENT
Start: 2023-11-09 | End: 2023-11-09 | Stop reason: HOSPADM

## 2023-11-09 RX ORDER — SODIUM CHLORIDE, SODIUM LACTATE, POTASSIUM CHLORIDE, CALCIUM CHLORIDE 600; 310; 30; 20 MG/100ML; MG/100ML; MG/100ML; MG/100ML
INJECTION, SOLUTION INTRAVENOUS CONTINUOUS PRN
Status: DISCONTINUED | OUTPATIENT
Start: 2023-11-09 | End: 2023-11-09

## 2023-11-09 RX ORDER — ASPIRIN 325 MG
325 TABLET ORAL DAILY
Qty: 28 TABLET | Refills: 0 | Status: SHIPPED | OUTPATIENT
Start: 2023-11-09 | End: 2023-12-07

## 2023-11-09 RX ORDER — OXYCODONE HYDROCHLORIDE AND ACETAMINOPHEN 5; 325 MG/1; MG/1
1 TABLET ORAL EVERY 4 HOURS PRN
Qty: 25 TABLET | Refills: 0 | Status: SHIPPED | OUTPATIENT
Start: 2023-11-09 | End: 2023-11-19

## 2023-11-09 RX ORDER — FENTANYL CITRATE 50 UG/ML
INJECTION, SOLUTION INTRAMUSCULAR; INTRAVENOUS AS NEEDED
Status: DISCONTINUED | OUTPATIENT
Start: 2023-11-09 | End: 2023-11-09

## 2023-11-09 RX ORDER — ROPIVACAINE HYDROCHLORIDE 5 MG/ML
INJECTION, SOLUTION EPIDURAL; INFILTRATION; PERINEURAL
Status: COMPLETED | OUTPATIENT
Start: 2023-11-09 | End: 2023-11-09

## 2023-11-09 RX ORDER — DEXAMETHASONE SODIUM PHOSPHATE 10 MG/ML
INJECTION, SOLUTION INTRAMUSCULAR; INTRAVENOUS AS NEEDED
Status: DISCONTINUED | OUTPATIENT
Start: 2023-11-09 | End: 2023-11-09

## 2023-11-09 RX ORDER — ONDANSETRON 2 MG/ML
4 INJECTION INTRAMUSCULAR; INTRAVENOUS ONCE AS NEEDED
Status: DISCONTINUED | OUTPATIENT
Start: 2023-11-09 | End: 2023-11-09 | Stop reason: HOSPADM

## 2023-11-09 RX ORDER — MIDAZOLAM HYDROCHLORIDE 2 MG/2ML
INJECTION, SOLUTION INTRAMUSCULAR; INTRAVENOUS AS NEEDED
Status: DISCONTINUED | OUTPATIENT
Start: 2023-11-09 | End: 2023-11-09

## 2023-11-09 RX ORDER — FENTANYL CITRATE/PF 50 MCG/ML
25 SYRINGE (ML) INJECTION
Status: COMPLETED | OUTPATIENT
Start: 2023-11-09 | End: 2023-11-09

## 2023-11-09 RX ORDER — CLINDAMYCIN PHOSPHATE 900 MG/50ML
900 INJECTION INTRAVENOUS ONCE
Status: COMPLETED | OUTPATIENT
Start: 2023-11-09 | End: 2023-11-09

## 2023-11-09 RX ORDER — CHLORHEXIDINE GLUCONATE ORAL RINSE 1.2 MG/ML
15 SOLUTION DENTAL ONCE
Status: COMPLETED | OUTPATIENT
Start: 2023-11-09 | End: 2023-11-09

## 2023-11-09 RX ORDER — CHLORHEXIDINE GLUCONATE 4 G/100ML
SOLUTION TOPICAL DAILY PRN
Status: DISCONTINUED | OUTPATIENT
Start: 2023-11-09 | End: 2023-11-09 | Stop reason: HOSPADM

## 2023-11-09 RX ORDER — MAGNESIUM HYDROXIDE 1200 MG/15ML
LIQUID ORAL AS NEEDED
Status: DISCONTINUED | OUTPATIENT
Start: 2023-11-09 | End: 2023-11-09 | Stop reason: HOSPADM

## 2023-11-09 RX ADMIN — SODIUM CHLORIDE, SODIUM LACTATE, POTASSIUM CHLORIDE, AND CALCIUM CHLORIDE: .6; .31; .03; .02 INJECTION, SOLUTION INTRAVENOUS at 13:35

## 2023-11-09 RX ADMIN — FENTANYL CITRATE 25 MCG: 50 INJECTION, SOLUTION INTRAMUSCULAR; INTRAVENOUS at 16:21

## 2023-11-09 RX ADMIN — OXYCODONE HYDROCHLORIDE AND ACETAMINOPHEN 1 TABLET: 5; 325 TABLET ORAL at 17:11

## 2023-11-09 RX ADMIN — FENTANYL CITRATE 50 MCG: 50 INJECTION, SOLUTION INTRAMUSCULAR; INTRAVENOUS at 13:20

## 2023-11-09 RX ADMIN — FENTANYL CITRATE 50 MCG: 50 INJECTION, SOLUTION INTRAMUSCULAR; INTRAVENOUS at 13:25

## 2023-11-09 RX ADMIN — FENTANYL CITRATE 25 MCG: 50 INJECTION, SOLUTION INTRAMUSCULAR; INTRAVENOUS at 16:25

## 2023-11-09 RX ADMIN — FENTANYL CITRATE 50 MCG: 50 INJECTION, SOLUTION INTRAMUSCULAR; INTRAVENOUS at 15:08

## 2023-11-09 RX ADMIN — MIDAZOLAM 2 MG: 1 INJECTION INTRAMUSCULAR; INTRAVENOUS at 13:20

## 2023-11-09 RX ADMIN — FENTANYL CITRATE 25 MCG: 50 INJECTION, SOLUTION INTRAMUSCULAR; INTRAVENOUS at 16:29

## 2023-11-09 RX ADMIN — PROPOFOL 200 MG: 10 INJECTION, EMULSION INTRAVENOUS at 13:39

## 2023-11-09 RX ADMIN — PROPOFOL 100 MG: 10 INJECTION, EMULSION INTRAVENOUS at 15:36

## 2023-11-09 RX ADMIN — CLINDAMYCIN PHOSPHATE 900 MG: 900 INJECTION, SOLUTION INTRAVENOUS at 13:35

## 2023-11-09 RX ADMIN — FENTANYL CITRATE 50 MCG: 50 INJECTION, SOLUTION INTRAMUSCULAR; INTRAVENOUS at 14:58

## 2023-11-09 RX ADMIN — DEXAMETHASONE SODIUM PHOSPHATE 8 MG: 10 INJECTION, SOLUTION INTRAMUSCULAR; INTRAVENOUS at 13:45

## 2023-11-09 RX ADMIN — FENTANYL CITRATE 25 MCG: 50 INJECTION, SOLUTION INTRAMUSCULAR; INTRAVENOUS at 16:36

## 2023-11-09 RX ADMIN — ONDANSETRON 4 MG: 2 INJECTION INTRAMUSCULAR; INTRAVENOUS at 15:15

## 2023-11-09 RX ADMIN — BUPIVACAINE 10 ML: 13.3 INJECTION, SUSPENSION, LIPOSOMAL INFILTRATION at 13:25

## 2023-11-09 RX ADMIN — BUPIVACAINE 10 ML: 13.3 INJECTION, SUSPENSION, LIPOSOMAL INFILTRATION at 13:30

## 2023-11-09 RX ADMIN — ROPIVACAINE HYDROCHLORIDE 10 ML: 5 INJECTION, SOLUTION EPIDURAL; INFILTRATION; PERINEURAL at 13:36

## 2023-11-09 RX ADMIN — CHLORHEXIDINE GLUCONATE 0.12% ORAL RINSE 15 ML: 1.2 LIQUID ORAL at 12:43

## 2023-11-09 NOTE — ANESTHESIA POSTPROCEDURE EVALUATION
Post-Op Assessment Note    CV Status:  Stable    Pain management: adequate     Mental Status:  Alert and awake   Hydration Status:  Euvolemic   PONV Controlled:  Controlled   Airway Patency:  Patent      Post Op Vitals Reviewed: Yes      Staff: CRNA         No notable events documented.     BP   135/75   Temp   97.2   Pulse 68   Resp   18   SpO2   98%

## 2023-11-09 NOTE — OP NOTE
OPERATIVE REPORT - Podiatry  PATIENT NAME: Cecilia George    :  1975  MRN: [id number]  Pt Location: [facility] OR ROOM 02    SURGERY DATE: 2023    Surgeon(s) and Role:     * Shaila Oliveira DPM - Primary     * Maddison Ho DPM - Assisting    Pre-op Diagnosis:  Lisfranc dislocation, right, initial encounter [S93.324A]  Closed displaced fracture of first metatarsal bone of right foot, initial encounter [S92.311A]  Closed displaced fracture of second metatarsal bone of right foot, initial encounter [S92.321A]  Closed displaced fracture of third metatarsal bone of right foot, initial encounter [S92.331A]  Closed nondisplaced fracture of medial cuneiform of right foot, initial encounter [S92.244A]  Closed nondisplaced fracture of fourth metatarsal bone of right foot, initial encounter [S92.344A]  Closed displaced fracture of fifth metatarsal bone of right foot, initial encounter [S92.351A]    Post-Op Diagnosis Codes:     * Lisfranc dislocation, right, initial encounter [W93.056K]     * Closed displaced fracture of first metatarsal bone of right foot, initial encounter [S92.311A]     * Closed displaced fracture of second metatarsal bone of right foot, initial encounter [Z05.512S]     * Closed displaced fracture of third metatarsal bone of right foot, initial encounter [S92.331A]     * Closed nondisplaced fracture of medial cuneiform of right foot, initial encounter [S92.244A]     * Closed nondisplaced fracture of fourth metatarsal bone of right foot, initial encounter [S92.344A]     * Closed displaced fracture of fifth metatarsal bone of right foot, initial encounter [S92.351A]    Procedure(s) (LRB):  OPEN REDUCTION W/ INTERNAL FIXATION METATARSAL FRACTURES 2 AND 3.(ORIF) FOOT (Right)  APPLICATION EXTERNAL FIXATION DEVICE LOWER EXTREMITY (Right)    Specimen(s):  * No specimens in log *    Estimated Blood Loss:   Minimal    Drains:  * No LDAs found *    Anesthesia Type:   General w/ Popliteal Block    Hemostasis:  Ankle tourniquet 250mmHg  Electrocautery    Materials:  Implant Name Type Inv. Item Serial No.  Lot No. LRB No. Used Action   [employer]ETRA[employer] FIXATOR D 40MM      Right 1 Implanted   HALF PIN 3.0 MM X 25MM X 80MM      Right 4 Implanted   K-WIRE 1.25 X 150MM SS - [id number]  K-WIRE 1.25 X 150MM SS  [employer]  Right 2 Implanted     3-0 Vicryl suture  4-0 Prolene suture  3-0 Nylon suture    Operative Findings:  Consistent with pre op diagnosis and imaging. Adequate reduction and stability of the 1st metatarsal base fracture and the 2nd and 3rd metatarsal neck fractures was achieved. Complications:   None    Procedure and Technique:     Under mild sedation, the patient was brought into the operating room and placed on the operating room table in the supine position. IV sedation was achieved by anesthesia team and a universal timeout was performed where all parties are in agreement of correct patient, correct procedure and correct site. A pneumatic tourniquet was then placed over the patient's right ankle with ample padding. The foot was then prepped and draped in the usual aseptic manner. Attention was then directed to the right foot. C arm xray was then used to identify and justin out bony landmarks with skin marker along the medial column. [facility] mini rail external fixator was then applied across the 1st TMTJ with two pins in the medial cuneiform and two pins in the metatarsal with stab incisions being made at the pin sites and blunt dissection being carried down to bone. The mini rail was then gently distracted to allow reduction of the 1st metatarsal base fracture. The rail was secured in this position. Attention was then directed to the right 2nd and 3rd metatarsal neck fractures. The fractures were attempted to be reduced with closed reduction, however this was not possible and it was decided to open reduce the fractures.  The limb was elevated and the pneumatic tourniquet was then inflated to 250mmHg. A linear longitudinal skin incision was made with a 15 blade over the midpoint between the 2nd and 3rd metatarsals. Blunt and sharp dissection was carried down to the shaft fractures and periosteum was reflected off bone. Each fracture was curetted and then reduced. A 0.45 K wire was then inserted percutaneously from the plantar distal MPJ into the metatarsal head and into the shaft across each fracture. C arm xray was used at all critical steps. The site was irrigated with normal saline. Deep closure was obtained using 3-0 Vicryl suture in interrupted fashion. Subcutaneous closure was obtained using 4-0 Monocryl suture in interrupted fashion. Skin closure was obtained using 3-0 Nylon suture in interrupted fashion. The two percutaneous K wires were then bent, cut, and capped. The site was dressed with adaptic, 4x4 gauze, ABD, and mark. A well padded posterior splint was then applied. The tourniquet was deflated at approximately 72 min and normal hyperemic response was noted to all digits. The patient tolerated the procedure and anesthesia well without immediate complications and transferred to PACU with vital signs stable. Dr. Pritesh Peña was present during the entire procedure and participated in all key aspects. SIGNATURE: Ronit Armstrong DPM  DATE: November 9, 2023  TIME: 4:16 PM      Portions of the record may have been created with voice recognition software. Occasional wrong word or "sound a like" substitutions may have occurred due to the inherent limitations of voice recognition software. Read the chart carefully and recognize, using context, where substitutions have occurred.

## 2023-11-09 NOTE — ANESTHESIA PROCEDURE NOTES
Peripheral Block    Patient location during procedure: holding area  Start time: 11/9/2023 1:19 PM  Reason for block: at surgeon's request and post-op pain management  Staffing  Performed by: Jonh Cisneros DO  Authorized by: Jonh Cisneros DO    Preanesthetic Checklist  Completed: patient identified, IV checked, site marked, risks and benefits discussed, surgical consent, monitors and equipment checked, pre-op evaluation and timeout performed  Peripheral Block  Prep: ChloraPrep  Patient monitoring: frequent blood pressure checks, continuous pulse oximetry and heart rate  Block type: Popliteal  Laterality: right  Injection technique: single-shot  Procedures: ultrasound guided, Ultrasound guidance required for the procedure to increase accuracy and safety of medication placement and decrease risk of complications.   Ultrasound permanent image savedbupivacaine liposomal (EXPAREL) 1.3 % injection 20 mL - Perineural   10 mL - 11/9/2023 1:30:00 PM  ropivacaine (NAROPIN) 0.5 % injection 20 mL - Perineural   10 mL - 11/9/2023 1:36:00 PM  Needle  Needle type: Stimuplex   Needle length: 2 in  Needle localization: anatomical landmarks and ultrasound guidance  Assessment  Injection assessment: incremental injection, frequent aspiration, injected with ease, negative aspiration, negative for heart rate change, no paresthesia on injection, no symptoms of intraneural/intravenous injection and needle tip visualized at all times  Paresthesia pain: none  Post-procedure:  site cleaned  patient tolerated the procedure well with no immediate complications

## 2023-11-09 NOTE — ANESTHESIA PREPROCEDURE EVALUATION
Procedure:  OPEN REDUCTION W/ INTERNAL FIXATION (ORIF) FOOT (Right: Foot)    Relevant Problems   HEMATOLOGY   (+) Hodgkin's lymphoma (HCC)      Musculoskeletal and Integument   (+) Closed fracture of right foot with routine healing             Anesthesia Plan  ASA Score- 2     Anesthesia Type- general with ASA Monitors. Additional Monitors:     Airway Plan: LMA. Plan Factors-    Induction- intravenous. Postoperative Plan- Plan for postoperative opioid use. Planned trial extubation    Informed Consent- Anesthetic plan and risks discussed with patient. I personally reviewed this patient with the CRNA. Discussed and agreed on the Anesthesia Plan with the CRNA. Ashley Mckeon

## 2023-11-09 NOTE — DISCHARGE SUMMARY
Discharge Summary Outpatient Procedure Podiatry -   Priti Webster 50 y.o. female MRN: [id number]  Unit/Bed#: OR POOL Encounter: [id number]    Admission Date: 11/9/2023     Admitting Diagnosis: Lisfranc dislocation, right, initial encounter [S93.324A]  Closed displaced fracture of first metatarsal bone of right foot, initial encounter [S92.311A]  Closed displaced fracture of second metatarsal bone of right foot, initial encounter [S92.321A]  Closed displaced fracture of third metatarsal bone of right foot, initial encounter [S92.331A]  Closed nondisplaced fracture of medial cuneiform of right foot, initial encounter [S92.244A]  Closed nondisplaced fracture of fourth metatarsal bone of right foot, initial encounter [S92.344A]  Closed displaced fracture of fifth metatarsal bone of right foot, initial encounter [S92.351A]    Discharge Diagnosis: same    Procedures Performed: OPEN REDUCTION W/ INTERNAL FIXATION METATARSAL FRACTURES 2 AND 3.(ORIF) FOOT: 11147 (CPT®)  APPLICATION EXTERNAL FIXATION DEVICE LOWER EXTREMITY:     Complications: none    Condition at Discharge: stable    Discharge instructions/Information to patient and family:   See after visit summary for information provided to patient and family. Provisions for Follow-Up Care/Important appointments:  See after visit summary for information related to follow-up care and any pertinent home health orders. Discharge Medications:  See after visit summary for reconciled discharge medications provided to patient and family.

## 2023-11-09 NOTE — DISCHARGE INSTR - AVS FIRST PAGE
Discharge Instructions - Podiatry    Weight Bearing Status: Non weight bearing to the right foot (right foot does not touch the ground). Use crutches for assistance. Pain: Continue analgesics as directed    Follow-up appointment instructions: Please go to your post op visit in about 1 week with Dr. Anthony Bowers. Contact sooner if any increase in pain, or signs of infection occur    Wound Care: Leave dressings clean, dry, and intact to your office visit. Keep the foot elevated as much as possible in the first 48 hours post op to minimize bleeding, swelling, and pain. Take Aspirin as prescribed to minimize the risk of blood clots in the leg while immobilized.

## 2023-11-12 ENCOUNTER — APPOINTMENT (EMERGENCY)
Dept: CT IMAGING | Facility: HOSPITAL | Age: 48
End: 2023-11-12
Payer: COMMERCIAL

## 2023-11-12 ENCOUNTER — APPOINTMENT (EMERGENCY)
Dept: NON INVASIVE DIAGNOSTICS | Facility: HOSPITAL | Age: 48
End: 2023-11-12
Payer: COMMERCIAL

## 2023-11-12 ENCOUNTER — HOSPITAL ENCOUNTER (EMERGENCY)
Facility: HOSPITAL | Age: 48
Discharge: HOME | End: 2023-11-12
Attending: EMERGENCY MEDICINE
Payer: COMMERCIAL

## 2023-11-12 ENCOUNTER — APPOINTMENT (EMERGENCY)
Dept: RADIOLOGY | Facility: HOSPITAL | Age: 48
End: 2023-11-12
Payer: COMMERCIAL

## 2023-11-12 VITALS
HEIGHT: 65 IN | DIASTOLIC BLOOD PRESSURE: 80 MMHG | RESPIRATION RATE: 18 BRPM | BODY MASS INDEX: 27.66 KG/M2 | HEART RATE: 85 BPM | SYSTOLIC BLOOD PRESSURE: 131 MMHG | OXYGEN SATURATION: 98 % | WEIGHT: 166.01 LBS | TEMPERATURE: 99 F

## 2023-11-12 DIAGNOSIS — M79.671 FOOT PAIN, RIGHT: ICD-10-CM
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
## 2023-11-12 LAB
2HR DELTA HS TROPONIN: 1 NG/L
ALBUMIN SERPL BCP-MCNC: 4.3 G/DL (ref 3.5–5)
ALP SERPL-CCNC: 63 U/L (ref 34–104)
ALT SERPL W P-5'-P-CCNC: 8 U/L (ref 7–52)
ANION GAP SERPL CALCULATED.3IONS-SCNC: 12 MMOL/L
APTT PPP: 25 SECONDS (ref 23–37)
AST SERPL W P-5'-P-CCNC: 11 U/L (ref 13–39)
BASOPHILS # BLD AUTO: 0.06 THOUSANDS/ÂΜL (ref 0–0.1)
BASOPHILS NFR BLD AUTO: 1 % (ref 0–1)
BILIRUB SERPL-MCNC: 0.53 MG/DL (ref 0.2–1)
BILIRUB UR QL STRIP: NEGATIVE
BUN SERPL-MCNC: 7 MG/DL (ref 5–25)
CALCIUM SERPL-MCNC: 9.6 MG/DL (ref 8.4–10.2)
CARDIAC TROPONIN I PNL SERPL HS: 2 NG/L
CARDIAC TROPONIN I PNL SERPL HS: 3 NG/L
CHLORIDE SERPL-SCNC: 104 MMOL/L (ref 96–108)
CK SERPL-CCNC: 75 U/L (ref 26–192)
CLARITY UR: CLEAR
CO2 SERPL-SCNC: 23 MMOL/L (ref 21–32)
COLOR UR: YELLOW
CREAT SERPL-MCNC: 0.57 MG/DL (ref 0.6–1.3)
D DIMER PPP FEU-MCNC: 1.11 UG/ML FEU
EOSINOPHIL # BLD AUTO: 0.02 THOUSAND/ÂΜL (ref 0–0.61)
EOSINOPHIL NFR BLD AUTO: 0 % (ref 0–6)
ERYTHROCYTE [DISTWIDTH] IN BLOOD BY AUTOMATED COUNT: 13.1 % (ref 11.6–15.1)
GFR SERPL CREATININE-BSD FRML MDRD: 109 ML/MIN/1.73SQ M
GLUCOSE SERPL-MCNC: 101 MG/DL (ref 65–140)
GLUCOSE UR STRIP-MCNC: NEGATIVE MG/DL
HCT VFR BLD AUTO: 43 % (ref 34.8–46.1)
HGB BLD-MCNC: 14.3 G/DL (ref 11.5–15.4)
HGB UR QL STRIP.AUTO: NEGATIVE
IMM GRANULOCYTES # BLD AUTO: 0.02 THOUSAND/UL (ref 0–0.2)
IMM GRANULOCYTES NFR BLD AUTO: 0 % (ref 0–2)
INR PPP: 1.15 (ref 0.84–1.19)
KETONES UR STRIP-MCNC: NEGATIVE MG/DL
LEUKOCYTE ESTERASE UR QL STRIP: NEGATIVE
LYMPHOCYTES # BLD AUTO: 2.32 THOUSANDS/ÂΜL (ref 0.6–4.47)
LYMPHOCYTES NFR BLD AUTO: 21 % (ref 14–44)
MCH RBC QN AUTO: 31.2 PG (ref 26.8–34.3)
MCHC RBC AUTO-ENTMCNC: 33.3 G/DL (ref 31.4–37.4)
MCV RBC AUTO: 94 FL (ref 82–98)
MONOCYTES # BLD AUTO: 0.36 THOUSAND/ÂΜL (ref 0.17–1.22)
MONOCYTES NFR BLD AUTO: 3 % (ref 4–12)
NEUTROPHILS # BLD AUTO: 8.56 THOUSANDS/ÂΜL (ref 1.85–7.62)
NEUTS SEG NFR BLD AUTO: 75 % (ref 43–75)
NITRITE UR QL STRIP: NEGATIVE
NRBC BLD AUTO-RTO: 0 /100 WBCS
PH UR STRIP.AUTO: 8.5 [PH]
PLATELET # BLD AUTO: 371 THOUSANDS/UL (ref 149–390)
PMV BLD AUTO: 10.5 FL (ref 8.9–12.7)
POTASSIUM SERPL-SCNC: 3.1 MMOL/L (ref 3.5–5.3)
PROT SERPL-MCNC: 7.3 G/DL (ref 6.4–8.4)
PROT UR STRIP-MCNC: NEGATIVE MG/DL
PROTHROMBIN TIME: 14.6 SECONDS (ref 11.6–14.5)
RBC # BLD AUTO: 4.58 MILLION/UL (ref 3.81–5.12)
SODIUM SERPL-SCNC: 139 MMOL/L (ref 135–147)
SP GR UR STRIP.AUTO: 1.01 (ref 1–1.03)
UROBILINOGEN UR QL STRIP.AUTO: 1 E.U./DL
WBC # BLD AUTO: 11.34 THOUSAND/UL (ref 4.31–10.16)

## 2023-11-12 PROCEDURE — 82550 ASSAY OF CK (CPK): CPT | Performed by: EMERGENCY MEDICINE

## 2023-11-12 PROCEDURE — 93005 ELECTROCARDIOGRAM TRACING: CPT

## 2023-11-12 PROCEDURE — 85379 FIBRIN DEGRADATION QUANT: CPT | Performed by: EMERGENCY MEDICINE

## 2023-11-12 PROCEDURE — 72125 CT NECK SPINE W/O DYE: CPT

## 2023-11-12 PROCEDURE — 85025 COMPLETE CBC W/AUTO DIFF WBC: CPT | Performed by: EMERGENCY MEDICINE

## 2023-11-12 PROCEDURE — 29515 APPLICATION SHORT LEG SPLINT: CPT | Performed by: EMERGENCY MEDICINE

## 2023-11-12 PROCEDURE — 73630 X-RAY EXAM OF FOOT: CPT

## 2023-11-12 PROCEDURE — 85730 THROMBOPLASTIN TIME PARTIAL: CPT | Performed by: EMERGENCY MEDICINE

## 2023-11-12 PROCEDURE — 99285 EMERGENCY DEPT VISIT HI MDM: CPT | Performed by: EMERGENCY MEDICINE

## 2023-11-12 PROCEDURE — 85610 PROTHROMBIN TIME: CPT | Performed by: EMERGENCY MEDICINE

## 2023-11-12 PROCEDURE — 81003 URINALYSIS AUTO W/O SCOPE: CPT | Performed by: EMERGENCY MEDICINE

## 2023-11-12 PROCEDURE — 84484 ASSAY OF TROPONIN QUANT: CPT | Performed by: EMERGENCY MEDICINE

## 2023-11-12 PROCEDURE — 73610 X-RAY EXAM OF ANKLE: CPT

## 2023-11-12 PROCEDURE — 93971 EXTREMITY STUDY: CPT

## 2023-11-12 PROCEDURE — 36415 COLL VENOUS BLD VENIPUNCTURE: CPT | Performed by: EMERGENCY MEDICINE

## 2023-11-12 PROCEDURE — 70450 CT HEAD/BRAIN W/O DYE: CPT

## 2023-11-12 PROCEDURE — 73590 X-RAY EXAM OF LOWER LEG: CPT

## 2023-11-12 PROCEDURE — 96374 THER/PROPH/DIAG INJ IV PUSH: CPT

## 2023-11-12 PROCEDURE — G1004 CDSM NDSC: HCPCS

## 2023-11-12 PROCEDURE — 80053 COMPREHEN METABOLIC PANEL: CPT | Performed by: EMERGENCY MEDICINE

## 2023-11-12 PROCEDURE — 96376 TX/PRO/DX INJ SAME DRUG ADON: CPT

## 2023-11-12 PROCEDURE — 71275 CT ANGIOGRAPHY CHEST: CPT

## 2023-11-12 PROCEDURE — 99284 EMERGENCY DEPT VISIT MOD MDM: CPT

## 2023-11-12 PROCEDURE — 93971 EXTREMITY STUDY: CPT | Performed by: SURGERY

## 2023-11-12 PROCEDURE — 96375 TX/PRO/DX INJ NEW DRUG ADDON: CPT

## 2023-11-12 RX ORDER — FENTANYL CITRATE 50 UG/ML
50 INJECTION, SOLUTION INTRAMUSCULAR; INTRAVENOUS ONCE
Status: COMPLETED | OUTPATIENT
Start: 2023-11-12 | End: 2023-11-12

## 2023-11-12 RX ORDER — ONDANSETRON 2 MG/ML
4 INJECTION INTRAMUSCULAR; INTRAVENOUS ONCE
Status: COMPLETED | OUTPATIENT
Start: 2023-11-12 | End: 2023-11-12

## 2023-11-12 RX ADMIN — FENTANYL CITRATE 50 MCG: 50 INJECTION, SOLUTION INTRAMUSCULAR; INTRAVENOUS at 14:45

## 2023-11-12 RX ADMIN — FENTANYL CITRATE 50 MCG: 50 INJECTION, SOLUTION INTRAMUSCULAR; INTRAVENOUS at 17:55

## 2023-11-12 RX ADMIN — FENTANYL CITRATE 50 MCG: 50 INJECTION, SOLUTION INTRAMUSCULAR; INTRAVENOUS at 13:46

## 2023-11-12 RX ADMIN — ONDANSETRON 4 MG: 2 INJECTION INTRAMUSCULAR; INTRAVENOUS at 13:46

## 2023-11-12 RX ADMIN — IOHEXOL 85 ML: 350 INJECTION, SOLUTION INTRAVENOUS at 14:51

## 2023-11-12 NOTE — ED PROVIDER NOTES
History  Chief Complaint   Patient presents with    Foot Pain     Brought in by EMS for fall. Patient fell last week and was seen for right foot fracture. Patient fell again today and re-injured right foot, states she heard a "pop" and rates pain 10/10. Right foot is splinted with pins visible. 51 yo female sustained a fall on 10/30/2023 heard a pop in her foot and had a payal injury to the foot with multiple metatarsal fractures she was admitted that night for pain management and then subsequently underwent Ex-Fix placement as well as ORIF of the second and third metatarsal by Dr. Laurie Fall on 11/9/2023. (She is postoperative day #3) today patient was using her walker she is on nonweightbearing status to the right foot when she lost her balance and sustains a fall onto her buttocks in the course of the fall she injured her right foot it is splinted and now is complaining of worsening right foot pain. The pain she did feel another pop she had pain just distal to the dorsi flexion crease of the foot as well as the medial aspect of the foot and the plantar aspect of the foot she is feeling radiating pain up the medial aspect of her legs she has no knee pain she denies any hip pain she did not fall back and hit her head there was no loss of consciousness when they were loading her onto the abdomen ambulance that she did have a brief episode of chest pain. She denies chest pain now she has had no pleuritic pain no shortness of breath she is not otherwise anticoagulated no prior history of DVT or PE. She has no new numbness or tingling she denies any abdominal pain she was little nauseated no headache visual disturbance no hip pain no neck pain.   Been taking oxycodone with Tylenol for pain management appointment with Dr. Laurie Fall is 11/29/2023  Has medical history of Hodgkin's lymphoma in remission migraines tobacco use disorder past surgical history is hysterectomy cholecystectomy appendectomy tubal ligation Mediport removal left ankle surgery EGD colonoscopy and he described foot surgery. Prior to Admission Medications   Prescriptions Last Dose Informant Patient Reported? Taking? Cholecalciferol 1.25 MG (43360 UT) capsule   No No   Sig: Take 1 capsule (50,000 Units total) by mouth once a week for 8 doses   aspirin 325 mg tablet   No No   Sig: Take 1 tablet (325 mg total) by mouth daily for 28 days   calcium carbonate-vitamin D 500 mg-5 mcg per tablet   No No   Sig: Take 1 tablet by mouth daily with breakfast   oxyCODONE-acetaminophen (Percocet) 5-325 mg per tablet   No No   Sig: Take 1 tablet by mouth every 4 (four) hours as needed for moderate pain for up to 10 days Max Daily Amount: 6 tablets      Facility-Administered Medications: None       Past Medical History:   Diagnosis Date    Back pain     Cancer (HCC)     History of transfusion     Hodgkin's disease ([address])     Hodgkin's lymphoma ([address])     Migraine     Unspecified ovarian cysts        Past Surgical History:   Procedure Laterality Date    ANKLE LIGAMENT RECONSTRUCTION Left 1992    APPENDECTOMY      CHOLECYSTECTOMY      COLONOSCOPY      EGD      HYSTERECTOMY      partial    LYMPH NODE BIOPSY      left neck    MEDIPORT INSERTION, SINGLE      MEDIPORT REMOVAL      TUBAL LIGATION         Family History   Problem Relation Age of Onset    Heart disease Mother     Hypertension Mother     Asthma Mother     COPD Mother     Heart disease Father     Hypertension Father     Cancer Father      I have reviewed and agree with the history as documented.     E-Cigarette/Vaping    E-Cigarette Use Former User      E-Cigarette/Vaping Substances    Nicotine No     THC No     CBD No     Flavoring No     Other No     Unknown No      Social History     Tobacco Use    Smoking status: Every Day     Packs/day: 1.00     Types: Cigarettes     Last attempt to quit: 3/24/2019     Years since quittin.6    Smokeless tobacco: Never    Tobacco comments:     Advised not to smoke prior to procedure  one pack daily   Vaping Use    Vaping Use: Former   Substance Use Topics    Alcohol use: Not Currently    Drug use: Never     Comment: smokes cigarettes only       Review of Systems   Constitutional:  Positive for activity change. Negative for appetite change, chills and fever. HENT:  Negative for congestion, ear pain, facial swelling, sinus pressure, sinus pain, trouble swallowing and voice change. Eyes:  Negative for discharge and visual disturbance. Respiratory:  Negative for cough and shortness of breath. Cardiovascular:  Positive for chest pain. Negative for leg swelling. Gastrointestinal:  Positive for nausea. Negative for abdominal distention, constipation and vomiting. Genitourinary:  Negative for decreased urine volume, difficulty urinating and frequency. Musculoskeletal:  Positive for arthralgias (rt foot pain). Negative for gait problem, myalgias, neck pain and neck stiffness. Skin:  Negative for rash and wound. Neurological:  Negative for weakness, numbness and headaches. Psychiatric/Behavioral:  Negative for confusion. All other systems reviewed and are negative. Physical Exam  Physical Exam  Vitals and nursing note reviewed. Constitutional:       General: She is not in acute distress. Appearance: She is not ill-appearing or toxic-appearing. HENT:      Head: Normocephalic. Right Ear: Tympanic membrane and external ear normal.      Left Ear: Tympanic membrane and external ear normal.      Nose: Nose normal. No congestion or rhinorrhea. Mouth/Throat:      Mouth: Mucous membranes are moist.      Pharynx: No oropharyngeal exudate or posterior oropharyngeal erythema. Eyes:      General:         Right eye: No discharge. Left eye: No discharge. Extraocular Movements: Extraocular movements intact. Conjunctiva/sclera: Conjunctivae normal.      Pupils: Pupils are equal, round, and reactive to light.    Neck:      Comments: No midline back T or L spine tenderness  Cardiovascular:      Rate and Rhythm: Regular rhythm. Tachycardia present. Pulses: Normal pulses. Pulmonary:      Effort: Pulmonary effort is normal. No respiratory distress. Breath sounds: Normal breath sounds. No stridor. No wheezing, rhonchi or rales. Abdominal:      General: Bowel sounds are normal. There is no distension. Tenderness: There is no abdominal tenderness. There is no right CVA tenderness or guarding. Musculoskeletal:         General: Normal range of motion. Cervical back: Normal range of motion and neck supple. No rigidity or tenderness. Right lower leg: No edema. Left lower leg: No edema. Comments: 2+ AT pulses   Skin:     General: Skin is warm and dry. Capillary Refill: Capillary refill takes less than 2 seconds. Neurological:      Mental Status: She is alert. Mental status is at baseline. Cranial Nerves: No cranial nerve deficit. Sensory: No sensory deficit. Motor: No weakness.       Coordination: Coordination normal.   Psychiatric:         Mood and Affect: Mood normal.         Vital Signs  ED Triage Vitals [11/12/23 1222]   Temperature Pulse Respirations Blood Pressure SpO2   99 °F (37.2 °C) 102 18 (!) 174/96 98 %      Temp Source Heart Rate Source Patient Position - Orthostatic VS BP Location FiO2 (%)   Tympanic Monitor Lying Left arm --      Pain Score       10 - Worst Possible Pain           Vitals:    11/12/23 1222 11/12/23 1800 11/12/23 1850   BP: (!) 174/96  131/80   Pulse: 102 85    Patient Position - Orthostatic VS: Lying  Lying         Visual Acuity      ED Medications  Medications   fentanyl citrate (PF) 100 MCG/2ML 50 mcg (50 mcg Intravenous Given 11/12/23 1346)   ondansetron (ZOFRAN) injection 4 mg (4 mg Intravenous Given 11/12/23 1346)   fentanyl citrate (PF) 100 MCG/2ML 50 mcg (50 mcg Intravenous Given 11/12/23 1445)   iohexol (OMNIPAQUE) 350 MG/ML injection (MULTI-DOSE) 85 mL (85 mL Intravenous Given 11/12/23 1451)   fentanyl citrate (PF) 100 MCG/2ML 50 mcg (50 mcg Intravenous Given 11/12/23 5474)       Diagnostic Studies  Results Reviewed       Procedure Component Value Units Date/Time    HS Troponin I 2hr [[id number]]  (Normal) Collected: 11/12/23 1543    Lab Status: Final result Specimen: Blood from Arm, Right Updated: 11/12/23 1611     hs TnI 2hr 3 ng/L      Delta 2hr hsTnI 1 ng/L     UA w Reflex to Microscopic w Reflex to Culture [[id number]]  (Abnormal) Collected: 11/12/23 1514    Lab Status: Final result Specimen: Urine, Clean Catch Updated: 11/12/23 1520     Color, UA Yellow     Clarity, UA Clear     Specific Gravity, UA 1.010     pH, UA 8.5     Leukocytes, UA Negative     Nitrite, UA Negative     Protein, UA Negative mg/dl      Glucose, UA Negative mg/dl      Ketones, UA Negative mg/dl      Urobilinogen, UA 1.0 E.U./dl      Bilirubin, UA Negative     Occult Blood, UA Negative    HS Troponin 0hr (reflex protocol) [[id number]]  (Normal) Collected: 11/12/23 1349    Lab Status: Final result Specimen: Blood from Arm, Right Updated: 11/12/23 1422     hs TnI 0hr 2 ng/L     CK [[id number]]  (Normal) Collected: 11/12/23 1349    Lab Status: Final result Specimen: Blood from Arm, Right Updated: 11/12/23 1417     Total CK 75 U/L     Comprehensive metabolic panel [[id number]]  (Abnormal) Collected: 11/12/23 1349    Lab Status: Final result Specimen: Blood from Arm, Right Updated: 11/12/23 1417     Sodium 139 mmol/L      Potassium 3.1 mmol/L      Chloride 104 mmol/L      CO2 23 mmol/L      ANION GAP 12 mmol/L      BUN 7 mg/dL      Creatinine 0.57 mg/dL      Glucose 101 mg/dL      Calcium 9.6 mg/dL      AST 11 U/L      ALT 8 U/L      Alkaline Phosphatase 63 U/L      Total Protein 7.3 g/dL      Albumin 4.3 g/dL      Total Bilirubin 0.53 mg/dL      eGFR 109 ml/min/1.73sq m     Narrative:      [place] guidelines for Chronic Kidney Disease (CKD):     Stage 1 with normal or high GFR (GFR > 90 mL/min/1.73 square meters)    Stage 2 Mild CKD (GFR = 60-89 mL/min/1.73 square meters)    Stage 3A Moderate CKD (GFR = 45-59 mL/min/1.73 square meters)    Stage 3B Moderate CKD (GFR = 30-44 mL/min/1.73 square meters)    Stage 4 Severe CKD (GFR = 15-29 mL/min/1.73 square meters)    Stage 5 End Stage CKD (GFR <15 mL/min/1.73 square meters)  Note: GFR calculation is accurate only with a steady state creatinine    D-Dimer [[id number]]  (Abnormal) Collected: 11/12/23 1349    Lab Status: Final result Specimen: Blood from Arm, Right Updated: 11/12/23 1412     D-Dimer, Quant 1.11 ug/ml FEU     Protime-INR [[id number]]  (Abnormal) Collected: 11/12/23 1349    Lab Status: Final result Specimen: Blood from Arm, Right Updated: 11/12/23 1410     Protime 14.6 seconds      INR 1.15    APTT [[id number]]  (Normal) Collected: 11/12/23 1349    Lab Status: Final result Specimen: Blood from Arm, Right Updated: 11/12/23 1410     PTT 25 seconds     CBC and differential [[id number]]  (Abnormal) Collected: 11/12/23 1349    Lab Status: Final result Specimen: Blood from Arm, Right Updated: 11/12/23 1359     WBC 11.34 Thousand/uL      RBC 4.58 Million/uL      Hemoglobin 14.3 g/dL      Hematocrit 43.0 %      MCV 94 fL      MCH 31.2 pg      MCHC 33.3 g/dL      RDW 13.1 %      MPV 10.5 fL      Platelets 180 Thousands/uL      nRBC 0 /100 WBCs      Neutrophils Relative 75 %      Immat GRANS % 0 %      Lymphocytes Relative 21 %      Monocytes Relative 3 %      Eosinophils Relative 0 %      Basophils Relative 1 %      Neutrophils Absolute 8.56 Thousands/µL      Immature Grans Absolute 0.02 Thousand/uL      Lymphocytes Absolute 2.32 Thousands/µL      Monocytes Absolute 0.36 Thousand/µL      Eosinophils Absolute 0.02 Thousand/µL      Basophils Absolute 0.06 Thousands/µL                    VAS lower limb venous duplex study, unilateral/limited   Final Result by Trish Hilliard MD (11/12 1612)      CTA ED chest PE study   Final Result by [facility], MD (11/12 1532)      No pulmonary embolism. Clear lungs. Workstation performed: [id number]         CT head without contrast   Final Result by Augustin York MD (11/12 1522)      No acute intracranial abnormality. Ethmoid sinus mucosal thickening on the right with nasal septal deviation to the right. Workstation performed: [id number]         CT cervical spine without contrast   Final Result by Augustin York MD (11/12 1526)      No cervical spine fracture or traumatic malalignment. Workstation performed: [id number]         XR tibia fibula 2 vw right   ED Interpretation by Phuong Pope MD (11/12 1439)   Per my independent interpretation. Radiologist to provide formal read. No acute fracture      Final Result by Júnior Brito MD (11/12 1500)      No acute osseous abnormality. Workstation performed: [id number]         XR ankle 3+ vw right   ED Interpretation by Phuong Pope MD (11/12 1440)   Per my independent interpretation. Radiologist to provide formal read. No acute fracture        Final Result by Júnior Brito MD (11/12 1506)      No acute osseous abnormality. Partly visualized external skeletal traction hardware. Workstation performed: [id number]         XR foot 3+ vw right   ED Interpretation by Phuong Pope MD (11/12 1439)   Per my independent interpretation. Radiologist to provide formal read. S/p orif no acute fracture      Final Result by Júnior Brito MD (11/12 4463)      Multiple fractures and hardware as noted on the prior study. Fracture position and alignment appear stable. No evidence for hardware complication.       Workstation performed: [id number]                    Procedures  ECG 12 Lead Documentation Only    Date/Time: 11/12/2023 2:02 PM    Performed by: Phuong Pope MD  Authorized by: Phuong Pope MD    Indications / Diagnosis:  Chest pain  ECG reviewed by me, the ED Provider: yes    Patient location:  ED  Previous ECG:     Previous ECG:  Compared to current    Comparison ECG info:  10/30/23 1401    Similarity:  No change  Rate:     ECG rate:  93    ECG rate assessment: normal    Rhythm:     Rhythm: sinus rhythm    QRS:     QRS axis:  Normal  Comments:       (previously 510) acute ischemic changes no significant change from prior  Splint application    Date/Time: 11/12/2023 6:08 PM    Performed by: Olga Santana MD  Authorized by: Olga Santana MD  Universal Protocol:  Procedure performed by: Yaneth Adler RN)  Patient identity confirmed: verbally with patient and arm band    Pre-procedure details:     Sensation:  Normal  Procedure details:     Laterality:  Right    Location:  Foot    Foot:  R foot    Splint type:  Short leg    Supplies:  Cotton padding, elastic bandage and Ortho-Glass (kerlex)  Post-procedure details:     Pain:  Improved    Sensation:  Normal    Patient tolerance of procedure: Tolerated well, no immediate complications  Comments:      Foam padding replaced to dorsum of foot to protect pins from ex fix device           ED Course  ED Course as of 11/12/23 2242   [facility] Nov 12, 2023   1341 Patient tolerated fentmayl IV with prior visits   1520 Foot dressing taken doen with Maeve RN no active bleeding from pin site no evidence of wound infection.  Images placed in media table; foot redressed with kerlex patting to pins and 4x4 dressing and ABD dressing to plantar aspect of the foot; u/s here to obtain venous dopppler of RLE   1524 Formal read of rt tib/fib rt ankle shoe no fracture and rt foot show stable fracture allignment and no hardware complication venous doppler u/s here to eval RLE then plan on splinting   1527 After discussion with vascular tech venous doppler u/s of RLE prelim no evidence of DVT   1545 [place] 11/9/23 oxycodone/acetaminophen 5/325 #25 5d supply; 10/31/23 oxycodone 10mg #16 5d supply   1822 Case discussed with  [facility] patient surgeon via TC recommends she call the office to move up followup appt   [phone] Issued walker to facilitate return home                               SBIRT 22yo+      Flowsheet Row Most Recent Value   Initial Alcohol Screen: US AUDIT-C     1. How often do you have a drink containing alcohol? 0 Filed at: 11/12/2023 1224   2. How many drinks containing alcohol do you have on a typical day you are drinking? 0 Filed at: 11/12/2023 1224   3a. Male UNDER 65: How often do you have five or more drinks on one occasion? 0 Filed at: 11/12/2023 1224   3b. FEMALE Any Age, or MALE 65+: How often do you have 4 or more drinks on one occassion? 0 Filed at: 11/12/2023 1224   Audit-C Score 0 Filed at: 11/12/2023 1224   SPIKE: How many times in the past year have you. .. Used an illegal drug or used a prescription medication for non-medical reasons? Never Filed at: 11/12/2023 1224                      Medical Decision Making  Mdm: Brief chest pain prior to arrival and history of losing balance patient will undergo for pulmonary embolism/DVT. CT head and neck as she may have a distracting injury with the increased right foot pain. plan on doing plain films of the right lower extremity to see if there is been any fracture or shift of the hardware will consult podiatry after work-up is complete. Manage pain with fentanyl as she has tolerated this in the past assess for acute coronary injury    Amount and/or Complexity of Data Reviewed  Labs: ordered. Radiology: ordered and independent interpretation performed. Risk  Prescription drug management. Disposition  Final diagnoses:   Fall   Foot pain, right - POD#3 Ex fix device for lisfranc injury and ORIF 2nd and 3rd metatarsal     Time reflects when diagnosis was documented in both MDM as applicable and the Disposition within this note       Time User Action Codes Description Comment    11/12/2023  3:43 PM Natalia Gupta Add [N93. XXXA] Fall     11/12/2023 3:43 PM Ruslan Albert Add [R05.891] Foot pain, right     11/12/2023  3:44 PM Ruslan Arch Modify [T96.093] Foot pain, right POD#3 Ex fix device for lisfranc injury and ORIF 2nd and 3rd metatarsal          ED Disposition       ED Disposition   Discharge    Condition   Stable    Date/Time   Sun Nov 12, 2023 1825    Comment   [facility] [facility] discharge to home/self care. Follow-up Information       Follow up With Specialties Details Why Contact Brenna Mendoza DPM Podiatry, Wound Care Call in 1 day move up follow up appointment [address] [address] [phone]              Discharge Medication List as of 11/12/2023  6:35 PM        CONTINUE these medications which have NOT CHANGED    Details   aspirin 325 mg tablet Take 1 tablet (325 mg total) by mouth daily for 28 days, Starting Thu 11/9/2023, Until Thu 12/7/2023, Normal      calcium carbonate-vitamin D 500 mg-5 mcg per tablet Take 1 tablet by mouth daily with breakfast, Starting Tue 11/7/2023, Until Mon 2/5/2024, Normal      Cholecalciferol 1.25 MG (83199 UT) capsule Take 1 capsule (50,000 Units total) by mouth once a week for 8 doses, Starting Tue 11/7/2023, Until Wed 12/27/2023, Normal      oxyCODONE-acetaminophen (Percocet) 5-325 mg per tablet Take 1 tablet by mouth every 4 (four) hours as needed for moderate pain for up to 10 days Max Daily Amount: 6 tablets, Starting Thu 11/9/2023, Until Sun 11/19/2023 at 2359, Normal             No discharge procedures on file.     PDMP Review         Value Time User    PDMP Reviewed  Yes 11/9/2023  4:01 PM Naldo Alba DPM            ED Provider  Electronically Signed by             Nubia Lucas MD  11/12/23 7849       Nubia Lucas MD  11/12/23 7083

## 2023-11-12 NOTE — DISCHARGE INSTRUCTIONS
Non weight bearing to rt leg use walker; elevated with not up and around  Dressing/splint to remain on until recheck with Dr. Mcclellan Sees call office tomorrow  Return with rubbing of splint fever increased pain or any new or worsening symptoms any trouble breathing recurrent chest pain or any new or worsening symptoms  Continue pain medications as directed by Dr. Mcclellan Sees  No drinking driving heavy machinery for 6 hours after discharge or with use of pain medications.

## 2023-11-13 ENCOUNTER — TELEPHONE (OUTPATIENT)
Dept: PODIATRY | Facility: CLINIC | Age: 48
End: 2023-11-13

## 2023-11-13 DIAGNOSIS — S93.324A LISFRANC DISLOCATION, RIGHT, INITIAL ENCOUNTER: Primary | ICD-10-CM

## 2023-11-13 RX ORDER — OXYCODONE HYDROCHLORIDE AND ACETAMINOPHEN 5; 325 MG/1; MG/1
1 TABLET ORAL EVERY 4 HOURS PRN
Qty: 10 TABLET | Refills: 0 | Status: SHIPPED | OUTPATIENT
Start: 2023-11-13 | End: 2023-11-30

## 2023-11-13 RX ORDER — NALOXONE HYDROCHLORIDE 4 MG/.1ML
SPRAY NASAL
Qty: 1 EACH | Refills: 1 | Status: SHIPPED | OUTPATIENT
Start: 2023-11-13 | End: 2024-11-12

## 2023-11-13 NOTE — TELEPHONE ENCOUNTER
Postoperative Follow-up Assessment done with Cheryle Hunger (caregiver)   Requesting Pain med refill in addition to making you aware of ED Visit. Surgery 11/9/23 2nd/3rd MT ORIF   ED Visit 11/12/23 Right Foot     Brought in by EMS for fall. Patient fell last week and was seen for right foot fracture. Patient fell again today and re-injured right foot, states she heard a "pop" and rates pain 10/10. Right foot is splinted with pins visible.         Pain Rating:    Icing: yes  Elevating: yes  Dressing: in tact   Ambulation: Walker  Falls: yes 11/12/23 eval in ED   Fever chills: no  Nausea /Vomiting: no         Pain Medication Regimen: Percocet 25 tablets will be out of this today  Anticoagulant: Asprin 325mg     Follow-up: 11/16/23

## 2023-11-13 NOTE — TELEPHONE ENCOUNTER
Caller: Niya Ovalle    Doctor: Shelile Das, DPM    Reason for call: Domenic Leo lost her balance yesterday and fell. She went to the ED, they took an x-ray and everything looks good on the x-ray. She is in a lot of pain. She only has one pain pill left and is requesting that another Rx be sent over to [facility] in [place].     Call back#: [phone] -Cristal Orourke (finance')

## 2023-11-14 LAB
ATRIAL RATE: 93 BPM
P AXIS: 49 DEGREES
PR INTERVAL: 136 MS
QRS AXIS: 49 DEGREES
QRSD INTERVAL: 92 MS
QT INTERVAL: 406 MS
QTC INTERVAL: 504 MS
T WAVE AXIS: 48 DEGREES
VENTRICULAR RATE: 93 BPM

## 2023-11-14 PROCEDURE — 93010 ELECTROCARDIOGRAM REPORT: CPT | Performed by: INTERNAL MEDICINE

## 2023-11-16 ENCOUNTER — OFFICE VISIT (OUTPATIENT)
Dept: PODIATRY | Facility: CLINIC | Age: 48
End: 2023-11-16

## 2023-11-16 VITALS
HEART RATE: 121 BPM | HEIGHT: 65 IN | SYSTOLIC BLOOD PRESSURE: 142 MMHG | WEIGHT: 166 LBS | DIASTOLIC BLOOD PRESSURE: 73 MMHG | BODY MASS INDEX: 27.66 KG/M2

## 2023-11-16 DIAGNOSIS — S93.324A LISFRANC DISLOCATION, RIGHT, INITIAL ENCOUNTER: Primary | ICD-10-CM

## 2023-11-16 DIAGNOSIS — S92.331A CLOSED DISPLACED FRACTURE OF THIRD METATARSAL BONE OF RIGHT FOOT, INITIAL ENCOUNTER: ICD-10-CM

## 2023-11-16 DIAGNOSIS — S92.344A CLOSED NONDISPLACED FRACTURE OF FOURTH METATARSAL BONE OF RIGHT FOOT, INITIAL ENCOUNTER: ICD-10-CM

## 2023-11-16 DIAGNOSIS — S92.244A CLOSED NONDISPLACED FRACTURE OF MEDIAL CUNEIFORM OF RIGHT FOOT, INITIAL ENCOUNTER: ICD-10-CM

## 2023-11-16 DIAGNOSIS — S92.311A CLOSED DISPLACED FRACTURE OF FIRST METATARSAL BONE OF RIGHT FOOT, INITIAL ENCOUNTER: ICD-10-CM

## 2023-11-16 DIAGNOSIS — S92.321A CLOSED DISPLACED FRACTURE OF SECOND METATARSAL BONE OF RIGHT FOOT, INITIAL ENCOUNTER: ICD-10-CM

## 2023-11-16 DIAGNOSIS — S92.351A CLOSED DISPLACED FRACTURE OF FIFTH METATARSAL BONE OF RIGHT FOOT, INITIAL ENCOUNTER: ICD-10-CM

## 2023-11-16 PROCEDURE — 99024 POSTOP FOLLOW-UP VISIT: CPT | Performed by: PODIATRIST

## 2023-11-16 RX ORDER — OXYCODONE HYDROCHLORIDE AND ACETAMINOPHEN 5; 325 MG/1; MG/1
1 TABLET ORAL EVERY 4 HOURS PRN
Qty: 15 TABLET | Refills: 0 | Status: SHIPPED | OUTPATIENT
Start: 2023-11-16

## 2023-11-16 NOTE — PROGRESS NOTES
Assessment/Plan:      Diagnoses and all orders for this visit:    Lisfranc dislocation, right, initial encounter    Closed displaced fracture of first metatarsal bone of right foot, initial encounter    Closed displaced fracture of second metatarsal bone of right foot, initial encounter    Closed displaced fracture of third metatarsal bone of right foot, initial encounter    Closed nondisplaced fracture of medial cuneiform of right foot, initial encounter    Closed nondisplaced fracture of fourth metatarsal bone of right foot, initial encounter    Closed displaced fracture of fifth metatarsal bone of right foot, initial encounter      -Doing well postsurgically, she is to return weekly for dressing changes, return 1 week for dressing change and Rx given for 15 more Percocet advised on ice elevation  -Strict nonweightbearing return in 1 week    Subjective:     Patient ID: Charlet Najjar is a 50 y.o. female. Patient presents for evaluation management of right foot, she is taking her pain meds as directed, wants to switch to ibuprofen, continue to ice, elevate presents posterior splint nonambulatory no new pedal complaints        Review of Systems   Constitutional:  Negative for chills and fever. HENT:  Negative for ear pain and sore throat. Eyes:  Negative for pain and visual disturbance. Respiratory:  Negative for cough and shortness of breath. Cardiovascular:  Negative for chest pain and palpitations. Gastrointestinal:  Negative for abdominal pain and vomiting. Genitourinary:  Negative for dysuria and hematuria. Musculoskeletal:  Negative for arthralgias and back pain. Skin:  Negative for color change and rash. Neurological:  Negative for seizures and syncope. All other systems reviewed and are negative.         Objective:     Physical Exam  Musculoskeletal:      Comments: Pins intact, good appearance with reduction of foot neuro vas status baseline range of motion intact

## 2023-11-22 ENCOUNTER — OFFICE VISIT (OUTPATIENT)
Dept: PODIATRY | Facility: CLINIC | Age: 48
End: 2023-11-22

## 2023-11-22 VITALS
SYSTOLIC BLOOD PRESSURE: 136 MMHG | BODY MASS INDEX: 26.33 KG/M2 | HEIGHT: 65 IN | DIASTOLIC BLOOD PRESSURE: 87 MMHG | WEIGHT: 158 LBS | HEART RATE: 116 BPM

## 2023-11-22 DIAGNOSIS — S93.324A LISFRANC DISLOCATION, RIGHT, INITIAL ENCOUNTER: Primary | ICD-10-CM

## 2023-11-22 DIAGNOSIS — S92.331A CLOSED DISPLACED FRACTURE OF THIRD METATARSAL BONE OF RIGHT FOOT, INITIAL ENCOUNTER: ICD-10-CM

## 2023-11-22 DIAGNOSIS — S92.311A CLOSED DISPLACED FRACTURE OF FIRST METATARSAL BONE OF RIGHT FOOT, INITIAL ENCOUNTER: ICD-10-CM

## 2023-11-22 DIAGNOSIS — S92.321A CLOSED DISPLACED FRACTURE OF SECOND METATARSAL BONE OF RIGHT FOOT, INITIAL ENCOUNTER: ICD-10-CM

## 2023-11-22 PROCEDURE — 99024 POSTOP FOLLOW-UP VISIT: CPT | Performed by: PODIATRIST

## 2023-11-22 RX ORDER — OXYCODONE HYDROCHLORIDE AND ACETAMINOPHEN 5; 325 MG/1; MG/1
1 TABLET ORAL EVERY 4 HOURS PRN
Qty: 10 TABLET | Refills: 0 | Status: SHIPPED | OUTPATIENT
Start: 2023-11-22

## 2023-11-22 NOTE — PROGRESS NOTES
Assessment/Plan:      Diagnoses and all orders for this visit:    Lisfranc dislocation, right, initial encounter  -     oxyCODONE-acetaminophen (Percocet) 5-325 mg per tablet; Take 1 tablet by mouth every 4 (four) hours as needed for moderate pain Max Daily Amount: 6 tablets    Closed displaced fracture of first metatarsal bone of right foot, initial encounter  -     oxyCODONE-acetaminophen (Percocet) 5-325 mg per tablet; Take 1 tablet by mouth every 4 (four) hours as needed for moderate pain Max Daily Amount: 6 tablets    Closed displaced fracture of second metatarsal bone of right foot, initial encounter  -     oxyCODONE-acetaminophen (Percocet) 5-325 mg per tablet; Take 1 tablet by mouth every 4 (four) hours as needed for moderate pain Max Daily Amount: 6 tablets    Closed displaced fracture of third metatarsal bone of right foot, initial encounter  -     oxyCODONE-acetaminophen (Percocet) 5-325 mg per tablet; Take 1 tablet by mouth every 4 (four) hours as needed for moderate pain Max Daily Amount: 6 tablets      -Will plan tentatively for Ex-Fix removal on December 28  -Rx given for 10 more Percocet and advised on use will plan to transition to ibuprofen and Tylenol following his next appointment  -Redressed with acticoat, DSD    Subjective:     Patient ID: Kelly Mendosa is a 50 y.o. female. Patient returns for evaluation management of her right foot, complaining of pain in the foot, did make Percocet prescription last until today. Reporting icing 6 and being off the foot compliantly. Review of Systems   Constitutional:  Negative for chills and fever. HENT:  Negative for ear pain and sore throat. Eyes:  Negative for pain and visual disturbance. Respiratory:  Negative for cough and shortness of breath. Cardiovascular:  Negative for chest pain and palpitations. Gastrointestinal:  Negative for abdominal pain and vomiting. Genitourinary:  Negative for dysuria and hematuria. Musculoskeletal:  Negative for arthralgias and back pain. Skin:  Negative for color change and rash. Neurological:  Negative for seizures and syncope. All other systems reviewed and are negative.         Objective:     Physical Exam  Musculoskeletal:      Comments: No evidence following suture removal, normal postoperative appearance pins are intact

## 2023-11-25 ENCOUNTER — APPOINTMENT (EMERGENCY)
Dept: RADIOLOGY | Facility: HOSPITAL | Age: 48
End: 2023-11-25
Payer: COMMERCIAL

## 2023-11-25 ENCOUNTER — HOSPITAL ENCOUNTER (EMERGENCY)
Facility: HOSPITAL | Age: 48
Discharge: HOME | End: 2023-11-25
Attending: EMERGENCY MEDICINE
Payer: COMMERCIAL

## 2023-11-25 VITALS
HEART RATE: 99 BPM | WEIGHT: 159.83 LBS | RESPIRATION RATE: 18 BRPM | BODY MASS INDEX: 26.63 KG/M2 | DIASTOLIC BLOOD PRESSURE: 90 MMHG | OXYGEN SATURATION: 98 % | TEMPERATURE: 99.2 F | HEIGHT: 65 IN | SYSTOLIC BLOOD PRESSURE: 163 MMHG

## 2023-11-25 DIAGNOSIS — G89.18 POST-OP PAIN: ICD-10-CM

## 2023-11-25 DIAGNOSIS — M79.673 FOOT PAIN: Primary | ICD-10-CM

## 2023-11-25 LAB
ATRIAL RATE: 90 BPM
P AXIS: 50 DEGREES
PR INTERVAL: 128 MS
QRS AXIS: 65 DEGREES
QRSD INTERVAL: 88 MS
QT INTERVAL: 396 MS
QTC INTERVAL: 484 MS
T WAVE AXIS: 68 DEGREES
VENTRICULAR RATE: 90 BPM

## 2023-11-25 PROCEDURE — 96374 THER/PROPH/DIAG INJ IV PUSH: CPT

## 2023-11-25 PROCEDURE — 96375 TX/PRO/DX INJ NEW DRUG ADDON: CPT

## 2023-11-25 PROCEDURE — 73630 X-RAY EXAM OF FOOT: CPT

## 2023-11-25 PROCEDURE — 99283 EMERGENCY DEPT VISIT LOW MDM: CPT

## 2023-11-25 PROCEDURE — 99284 EMERGENCY DEPT VISIT MOD MDM: CPT | Performed by: EMERGENCY MEDICINE

## 2023-11-25 PROCEDURE — 93005 ELECTROCARDIOGRAM TRACING: CPT

## 2023-11-25 RX ORDER — HYDROMORPHONE HCL/PF 1 MG/ML
0.5 SYRINGE (ML) INJECTION ONCE
Status: COMPLETED | OUTPATIENT
Start: 2023-11-25 | End: 2023-11-25

## 2023-11-25 RX ORDER — ONDANSETRON 2 MG/ML
4 INJECTION INTRAMUSCULAR; INTRAVENOUS ONCE
Status: COMPLETED | OUTPATIENT
Start: 2023-11-25 | End: 2023-11-25

## 2023-11-25 RX ORDER — OXYCODONE HYDROCHLORIDE AND ACETAMINOPHEN 5; 325 MG/1; MG/1
1 TABLET ORAL ONCE
Status: COMPLETED | OUTPATIENT
Start: 2023-11-25 | End: 2023-11-25

## 2023-11-25 RX ORDER — OXYCODONE HYDROCHLORIDE AND ACETAMINOPHEN 5; 325 MG/1; MG/1
1 TABLET ORAL EVERY 6 HOURS PRN
Qty: 10 TABLET | Refills: 0 | Status: SHIPPED | OUTPATIENT
Start: 2023-11-25 | End: 2023-12-05

## 2023-11-25 RX ADMIN — ONDANSETRON 4 MG: 2 INJECTION INTRAMUSCULAR; INTRAVENOUS at 12:52

## 2023-11-25 RX ADMIN — HYDROMORPHONE HYDROCHLORIDE 0.5 MG: 1 INJECTION, SOLUTION INTRAMUSCULAR; INTRAVENOUS; SUBCUTANEOUS at 12:52

## 2023-11-25 RX ADMIN — OXYCODONE HYDROCHLORIDE AND ACETAMINOPHEN 1 TABLET: 5; 325 TABLET ORAL at 14:49

## 2023-11-25 NOTE — ED PROVIDER NOTES
History  Chief Complaint   Patient presents with    Foot Pain     Patient arrived by ems with right foot pain. Patient had surgery 11/9 (ex fix present) and presents with increased pain and feels "the pins shifted" patient was directed to the ER by Dr Renee Prasad. Rates pain 10/10 and last does of percocet was 0630 this morning with no relief      HPI    51 yo F with past medical history of Lisfranc injury status post surgical fixation on 11/9 with podiatry who presents for evaluation of foot pain. Patient states she started having worsening pain since last night. She feels like the pins in her foot have shifted. She states she has been completely nonweightbearing. She denies any recent falls or injuries. Patient states she has been taking Percocet every day since surgery. She states she took Percocet this morning but did not help with the pain. Patient denies fevers. She states she has been nauseous from the pain. Denies chest pain, shortness of breath, abdominal pain. Patient states she called her surgeon this morning who told her to come to the emergency department for further evaluation. Prior to Admission Medications   Prescriptions Last Dose Informant Patient Reported? Taking? Cholecalciferol 1.25 MG (43213 UT) capsule  Self No No   Sig: Take 1 capsule (50,000 Units total) by mouth once a week for 8 doses   aspirin 325 mg tablet  Self No No   Sig: Take 1 tablet (325 mg total) by mouth daily for 28 days   calcium carbonate-vitamin D 500 mg-5 mcg per tablet  Self No No   Sig: Take 1 tablet by mouth daily with breakfast   naloxone (NARCAN) 4 mg/0.1 mL nasal spray  Self No No   Sig: Administer 1 spray into a nostril. If no response after 2-3 minutes, give another dose in the other nostril using a new spray.    oxyCODONE-acetaminophen (Percocet) 5-325 mg per tablet  Self No No   Sig: Take 1 tablet by mouth every 4 (four) hours as needed for moderate pain Max Daily Amount: 6 tablets   Patient not taking: Reported on 11/22/2023   oxyCODONE-acetaminophen (Percocet) 5-325 mg per tablet  Self No No   Sig: Take 1 tablet by mouth every 4 (four) hours as needed for moderate pain Max Daily Amount: 6 tablets   Patient not taking: Reported on 11/22/2023   oxyCODONE-acetaminophen (Percocet) 5-325 mg per tablet   No No   Sig: Take 1 tablet by mouth every 4 (four) hours as needed for moderate pain Max Daily Amount: 6 tablets      Facility-Administered Medications: None       Past Medical History:   Diagnosis Date    Back pain     Cancer ([address])     History of transfusion     Hodgkin's disease ([address])     Hodgkin's lymphoma ([address])     Migraine     Unspecified ovarian cysts        Past Surgical History:   Procedure Laterality Date    ANKLE LIGAMENT RECONSTRUCTION Left 07/1992    APPENDECTOMY      CHOLECYSTECTOMY      COLONOSCOPY      EGD      EXTERNAL FIXATOR APPLICATION Right 78/8/1606    Procedure: APPLICATION EXTERNAL FIXATION DEVICE LOWER EXTREMITY;  Surgeon: Cyndie Sneed DPM;  Location: [facility];  Service: Podiatry    HYSTERECTOMY      partial    LYMPH NODE BIOPSY      left neck    MEDIPORT INSERTION, SINGLE      MEDIPORT REMOVAL      CT OPEN TREATMENT METATARSAL FRACTURE EACH Right 11/9/2023    Procedure: OPEN REDUCTION W/ INTERNAL FIXATION METATARSAL FRACTURES 2 AND 3.(ORIF) FOOT;  Surgeon: Cyndie Sneed DPM;  Location: [facility];  Service: Podiatry    TUBAL LIGATION         Family History   Problem Relation Age of Onset    Heart disease Mother     Hypertension Mother     Asthma Mother     COPD Mother     Heart disease Father     Hypertension Father     Cancer Father      I have reviewed and agree with the history as documented.     E-Cigarette/Vaping    E-Cigarette Use Former User      E-Cigarette/Vaping Substances    Nicotine No     THC No     CBD No     Flavoring No     Other No     Unknown No      Social History     Tobacco Use    Smoking status: Every Day     Packs/day: 1.00     Types: Cigarettes     Last attempt to quit: 3/24/2019     Years since quittin.6    Smokeless tobacco: Never    Tobacco comments:     Advised not to smoke prior to procedure  one pack daily   Vaping Use    Vaping Use: Former   Substance Use Topics    Alcohol use: Not Currently    Drug use: Never     Comment: smokes cigarettes only       Review of Systems   Constitutional:  Negative for appetite change, chills and fever. HENT:  Negative for congestion, rhinorrhea and sore throat. Respiratory:  Negative for cough and shortness of breath. Cardiovascular:  Negative for chest pain. Gastrointestinal:  Negative for abdominal pain, diarrhea, nausea and vomiting. Musculoskeletal:  Positive for arthralgias. Negative for myalgias. Skin:  Negative for rash. Neurological:  Negative for dizziness, weakness, light-headedness, numbness and headaches. All other systems reviewed and are negative. Physical Exam  Physical Exam  Vitals and nursing note reviewed. Constitutional:       General: She is not in acute distress. Appearance: Normal appearance. She is well-developed and normal weight. She is not ill-appearing, toxic-appearing or diaphoretic. HENT:      Head: Normocephalic and atraumatic. Right Ear: External ear normal.      Left Ear: External ear normal.      Nose: Nose normal.      Mouth/Throat:      Mouth: Mucous membranes are moist.      Pharynx: Oropharynx is clear. Eyes:      Extraocular Movements: Extraocular movements intact. Conjunctiva/sclera: Conjunctivae normal.   Cardiovascular:      Rate and Rhythm: Normal rate and regular rhythm. Pulses: Normal pulses. Heart sounds: Normal heart sounds. No murmur heard. No friction rub. No gallop. Pulmonary:      Effort: Pulmonary effort is normal. No respiratory distress. Breath sounds: Normal breath sounds. No wheezing or rales. Abdominal:      General: There is no distension. Palpations: Abdomen is soft. Tenderness: There is no abdominal tenderness. There is no guarding or rebound. Musculoskeletal:         General: No tenderness. Cervical back: Neck supple. Comments: Ex-fix on the right foot, incision to the top of the foot appears clean, dry and intact. No redness or significant swelling of the foot. Tenderness diffusely throughout the forefoot. Skin:     General: Skin is warm and dry. Coloration: Skin is not pale. Findings: No erythema or rash. Neurological:      General: No focal deficit present. Mental Status: She is alert and oriented to person, place, and time. Cranial Nerves: No cranial nerve deficit. Sensory: No sensory deficit. Motor: No weakness. Psychiatric:         Mood and Affect: Mood normal.         Behavior: Behavior normal.         Vital Signs  ED Triage Vitals [11/25/23 1219]   Temperature Pulse Respirations Blood Pressure SpO2   99.2 °F (37.3 °C) 99 18 163/90 98 %      Temp Source Heart Rate Source Patient Position - Orthostatic VS BP Location FiO2 (%)   Temporal Monitor Lying Left arm --      Pain Score       10 - Worst Possible Pain           Vitals:    11/25/23 1219   BP: 163/90   Pulse: 99   Patient Position - Orthostatic VS: Lying         Visual Acuity      ED Medications  Medications   HYDROmorphone (DILAUDID) injection 0.5 mg (0.5 mg Intravenous Given 11/25/23 1252)   ondansetron (ZOFRAN) injection 4 mg (4 mg Intravenous Given 11/25/23 1252)   oxyCODONE-acetaminophen (PERCOCET) 5-325 mg per tablet 1 tablet (1 tablet Oral Given 11/25/23 1449)       Diagnostic Studies  Results Reviewed       None                   XR foot 3+ views RIGHT    (Results Pending)              Procedures  Procedures         ED Course                               SBIRT 22yo+      Flowsheet Row Most Recent Value   Initial Alcohol Screen: US AUDIT-C     1. How often do you have a drink containing alcohol? 0 Filed at: 11/25/2023 1220   2.  How many drinks containing alcohol do you have on a typical day you are drinking? 0 Filed at: 11/25/2023 1220   3a. Male UNDER 65: How often do you have five or more drinks on one occasion? 0 Filed at: 11/25/2023 1220   3b. FEMALE Any Age, or MALE 65+: How often do you have 4 or more drinks on one occassion? 0 Filed at: 11/25/2023 1220   Audit-C Score 0 Filed at: 11/25/2023 1220   SPIKE: How many times in the past year have you. .. Used an illegal drug or used a prescription medication for non-medical reasons? Never Filed at: 11/25/2023 1220                      Medical Decision Making  Amount and/or Complexity of Data Reviewed  Radiology: ordered. Risk  Prescription drug management. 51 yo F with past medical history of Lisfranc injury status post surgical fixation on 11/9 with podiatry who presents for evaluation of foot pain. Will obtain x-ray to evaluate for hardware displacement or hardware fracture. Will treat symptomatically with Dilaudid and Zofran. Reviewed and interpreted x-ray, hardware appears to be in good place, no fractures of the hardware, bones appear to be healing. Discussed with patient's podiatrist, he also reviewed x-rays, he agrees that hardware appears to be in good place. Will plan to discharge patient with follow-up this week as scheduled, podiatry is in agreement with this plan. Will provide short prescription for Percocet for postoperative pain. Discussed with patient strict return precautions. Patient expressed understanding and was agreeable for discharge. Disposition  Final diagnoses:    Foot pain   Post-op pain     Time reflects when diagnosis was documented in both MDM as applicable and the Disposition within this note       Time User Action Codes Description Comment    11/25/2023  2:32 PM Rebeca Parry Add [E13.673] Foot pain     11/25/2023  2:32 PM Rebeca Parry Add [G89.18] Post-op pain           ED Disposition       ED Disposition   Discharge    Condition   Stable    Date/Time Sat Nov 25, 2023 0051 [facility] [place] discharge to home/self care. Follow-up Information       Follow up With Specialties Details Why Contact Info    Pankaj Drake DPM Podiatry, Wound Care Go to   [address] [place] [address]  [phone]              Patient's Medications   Discharge Prescriptions    OXYCODONE-ACETAMINOPHEN (PERCOCET) 5-325 MG PER TABLET    Take 1 tablet by mouth every 6 (six) hours as needed for moderate pain for up to 10 days Max Daily Amount: 4 tablets       Start Date: 11/25/2023End Date: 12/5/2023       Order Dose: 1 tablet       Quantity: 10 tablet    Refills: 0       No discharge procedures on file.     PDMP Review         Value Time User    PDMP Reviewed  Yes 11/9/2023  4:01 PM Amberly Wolf DPM            ED Provider  Electronically Signed by             Angelo Mcgrath MD  11/25/23 3383

## 2023-11-25 NOTE — DISCHARGE INSTRUCTIONS
Please follow up with podiatry this week as scheduled. You can continue tylenol and motrin every 6 hours as needed for mild/moderate pain, you may take percocet every 6 hours as needed for severe pain.

## 2023-11-30 ENCOUNTER — APPOINTMENT (OUTPATIENT)
Dept: RADIOLOGY | Facility: MEDICAL CENTER | Age: 48
End: 2023-11-30
Payer: COMMERCIAL

## 2023-11-30 ENCOUNTER — OFFICE VISIT (OUTPATIENT)
Dept: PODIATRY | Facility: CLINIC | Age: 48
End: 2023-11-30
Payer: COMMERCIAL

## 2023-11-30 DIAGNOSIS — S93.324A LISFRANC DISLOCATION, RIGHT, INITIAL ENCOUNTER: Primary | ICD-10-CM

## 2023-11-30 DIAGNOSIS — S93.324A LISFRANC DISLOCATION, RIGHT, INITIAL ENCOUNTER: ICD-10-CM

## 2023-11-30 DIAGNOSIS — M79.2 NEURITIS: ICD-10-CM

## 2023-11-30 PROCEDURE — 73630 X-RAY EXAM OF FOOT: CPT

## 2023-11-30 PROCEDURE — 99213 OFFICE O/P EST LOW 20 MIN: CPT | Performed by: PODIATRIST

## 2023-11-30 RX ORDER — GABAPENTIN 300 MG/1
300 CAPSULE ORAL 3 TIMES DAILY
Qty: 30 CAPSULE | Refills: 1 | Status: SHIPPED | OUTPATIENT
Start: 2023-11-30

## 2023-11-30 NOTE — PROGRESS NOTES
Assessment/Plan:    No problem-specific Assessment & Plan notes found for this encounter. Diagnoses and all orders for this visit:    Lisfranc dislocation, right, initial encounter  -     X-ray foot right 3+ views; Future      -I have reviewed my Tiger text and also the ER note, there was no written discussion about changing her weightbearing status and I have warned her about any pressure against this. - She is to be strict nonweightbearing, and we will obtain x-rays today to assure that the fixation has not shifted  -X-rays reviewed compared to the series since postop, they do not show any shifting of the K wires or loss of overall fixation, there is visible increased osseous bridging across the base of the metatarsals  -Dressing reapplied, return 1 week for dressing change  -Allergies to correlating so feet with rash, Rx given for gabapentin and advised on use, we did discuss side effects and she is to stop the medication if she is experiencing any side effects  - Hoping this will help her sleep and she is to continue ice elevate and be completely strict nonweightbearing      Subjective:      Patient ID: Norma Antonio is a 50 y.o. female. Patient presents for evaluation management of her right foot, she did go to the ER for assessment of possible move fixation, she presents today with a dirty dressing and the lateral aspect of the foot is dirty. She states that she has been placing pressure on it and walking on the outside of her foot as she was directed by the ER to allow some increased pressure. The following portions of the patient's history were reviewed and updated as appropriate: allergies, current medications, past family history, past medical history, past social history, past surgical history, and problem list.    Review of Systems   Constitutional:  Negative for chills and fever. HENT:  Negative for ear pain and sore throat. Eyes:  Negative for pain and visual disturbance. Respiratory:  Negative for cough and shortness of breath. Cardiovascular:  Negative for chest pain and palpitations. Gastrointestinal:  Negative for abdominal pain and vomiting. Genitourinary:  Negative for dysuria and hematuria. Musculoskeletal:  Negative for arthralgias and back pain. Skin:  Negative for color change and rash. Neurological:  Negative for seizures and syncope. All other systems reviewed and are negative.         Objective:      LMP  (LMP Unknown)          Physical Exam  Musculoskeletal:      Comments: Pins and surgical site of the foot appear to be within normal limits and appear to be free of infection and normal for postoperative course

## 2023-11-30 NOTE — ADDENDUM NOTE
Addended by: Domenico Esparza on: 11/30/2023 09:40 AM     Modules accepted: Orders, Level of Service

## 2023-12-01 ENCOUNTER — TELEPHONE (OUTPATIENT)
Age: 48
End: 2023-12-01

## 2023-12-01 NOTE — TELEPHONE ENCOUNTER
Hello,  Please advise if the following patient can be forced onto the schedule:    Patient: Chanelle Newman    : 1975    MRN: [id number]    Call back #: [phone]    Insurance: [address] [place] [address] you    Reason for appointment: post op/needs to be seen 1 week/needs latest appt in day as rhoda works until 2 and is one hour away from home and is her only transportation. Requested doctor/location: Dr. Sulma Joy office      Thank you. no

## 2023-12-08 ENCOUNTER — TELEPHONE (OUTPATIENT)
Age: 48
End: 2023-12-08

## 2023-12-08 NOTE — TELEPHONE ENCOUNTER
Hello,  Please advise if the following patient can be forced onto the schedule:    Patient: June Manning    : 1975    MRN: [id number]    Call back #: [phone]    Insurance: [facility]    Reason for appointment: Post op/has stomach bug and cannot get to appt today-please call back to schedule-she can come Tuesday/Wednesday/Thursday next week as only days she can get a ride. Requested doctor/location: Dr. Latham Allen office      Thank you.

## 2023-12-13 ENCOUNTER — OFFICE VISIT (OUTPATIENT)
Dept: PODIATRY | Facility: CLINIC | Age: 48
End: 2023-12-13

## 2023-12-13 ENCOUNTER — TELEPHONE (OUTPATIENT)
Dept: PODIATRY | Facility: CLINIC | Age: 48
End: 2023-12-13

## 2023-12-13 VITALS
HEART RATE: 112 BPM | HEIGHT: 65 IN | DIASTOLIC BLOOD PRESSURE: 91 MMHG | BODY MASS INDEX: 26.49 KG/M2 | WEIGHT: 159 LBS | SYSTOLIC BLOOD PRESSURE: 149 MMHG

## 2023-12-13 DIAGNOSIS — S93.324A LISFRANC DISLOCATION, RIGHT, INITIAL ENCOUNTER: Primary | ICD-10-CM

## 2023-12-13 PROCEDURE — 99024 POSTOP FOLLOW-UP VISIT: CPT | Performed by: PODIATRIST

## 2023-12-13 RX ORDER — CHLORHEXIDINE GLUCONATE 4 G/100ML
SOLUTION TOPICAL DAILY PRN
OUTPATIENT
Start: 2023-12-13

## 2023-12-13 RX ORDER — CHLORHEXIDINE GLUCONATE ORAL RINSE 1.2 MG/ML
15 SOLUTION DENTAL ONCE
OUTPATIENT
Start: 2023-12-13 | End: 2023-12-13

## 2023-12-13 RX ORDER — TRAMADOL HYDROCHLORIDE 50 MG/1
50 TABLET ORAL EVERY 6 HOURS PRN
Qty: 20 TABLET | Refills: 0 | Status: SHIPPED | OUTPATIENT
Start: 2023-12-13 | End: 2023-12-22 | Stop reason: ALTCHOICE

## 2023-12-13 NOTE — TELEPHONE ENCOUNTER
Caller: Alvin Paymihcael    Doctor: Gurwinder Pryor DPM    Reason for call: [facility] called Deanna Whitley to let her know that the office needs to preauthorize her Tramadol. She called her insurance and they were going to call Dr. Sammy Williamson office to expedite the authorization so that she can get it today.     Call back#: [phone] call Deanna Whitley when this has been handled

## 2023-12-13 NOTE — PROGRESS NOTES
Assessment/Plan:    No problem-specific Assessment & Plan notes found for this encounter. Diagnoses and all orders for this visit:    Lisfranc dislocation, right, initial encounter  -     Case request operating room: REMOVAL EXTERNAL FIXATOR (EX FIX); Standing  -     Case request operating room: REMOVAL EXTERNAL FIXATOR (EX FIX)    Other orders  -     Nursing Communication [facility] Interventions Implemented; Standing  -     Nursing Communication CH bath, have staff wash entire body (neck down) per pre-op bathing protocol. Routine, evening prior to, and day of surgery.; Standing  -     Nursing Communication Swab both nares with Povidone-Iodine solution, EXCLUDE if patient has shellfish/Iodine allergy, and replace with nasal alcohol swabstick. Routine, day of surgery, on call to OR; Standing  -     chlorhexidine (PERIDEX) 0.12 % oral rinse 15 mL  -     Void on call to OR; Standing  -     Insert peripheral IV; Standing  -     Diet NPO; Sips with meds; Standing  -     chlorhexidine (HIBICLENS) 4 % topical liquid  -     vancomycin (VANCOCIN) 1,000 mg in sodium chloride 0.9 % 500 mL IVPB      -Plan for Ex-Fix removal on 12/28  -Will plan for strict nonweightbearing until pin removal sites are healed. - If she is having continued pain gapping etc. following Ex-Fix removal she will likely need fusion  -She is still having severe pain in spite of having some gabapentin we will give her 20 tramadol    Subjective:      Patient ID: Shelby Cedillo is a 50 y.o. female. Patient was for evaluation management of her right foot, she is to continue his severe pain. He feels that the pain are moving and shifting and they likely are.   Did trial gabapentin not successful        The following portions of the patient's history were reviewed and updated as appropriate: allergies, current medications, past family history, past medical history, past social history, past surgical history, and problem list.    Review of Systems Constitutional:  Negative for chills and fever. HENT:  Negative for ear pain and sore throat. Eyes:  Negative for pain and visual disturbance. Respiratory:  Negative for cough and shortness of breath. Cardiovascular:  Negative for chest pain and palpitations. Gastrointestinal:  Negative for abdominal pain and vomiting. Genitourinary:  Negative for dysuria and hematuria. Musculoskeletal:  Negative for arthralgias and back pain. Skin:  Negative for color change and rash. Neurological:  Negative for seizures and syncope. All other systems reviewed and are negative. Objective:      /91   Pulse (!) 112   Ht 5' 5" (1.651 m)   Wt 72.1 kg (159 lb)   LMP  (LMP Unknown)   BMI 26.46 kg/m²          Physical Exam  Musculoskeletal:      Comments: Pins are intact, Ex-Fix is intact, no signs of infection, normal postoperative result.

## 2023-12-15 ENCOUNTER — TELEPHONE (OUTPATIENT)
Age: 48
End: 2023-12-15

## 2023-12-15 NOTE — TELEPHONE ENCOUNTER
Caller: Adryan     Doctor: Ethan    Reason for call: She stated her medication tramdol needs a prior auth wanted to know if you are working on it.     Call back#: [phone]

## 2023-12-18 NOTE — TELEPHONE ENCOUNTER
PA HAS BEEN SUBMITTED TO PLAN FOR COVERAGE DETERMINATION    Pending sale to [facility] [id number]     WILL AWAIT PLAN RESPONSE    PT'S INSURANCE IS IN THE LAST NAME REPSHER

## 2023-12-19 ENCOUNTER — TELEPHONE (OUTPATIENT)
Dept: PODIATRY | Facility: CLINIC | Age: 48
End: 2023-12-19

## 2023-12-19 NOTE — TELEPHONE ENCOUNTER
Please see below message. I reviewed phone notes with patient. Has this script been sent to [facility] yet?  Please return call. Thank you

## 2023-12-22 ENCOUNTER — TELEPHONE (OUTPATIENT)
Dept: FAMILY MEDICINE CLINIC | Facility: CLINIC | Age: 48
End: 2023-12-22

## 2023-12-22 ENCOUNTER — CONSULT (OUTPATIENT)
Dept: FAMILY MEDICINE CLINIC | Facility: CLINIC | Age: 48
End: 2023-12-22
Payer: COMMERCIAL

## 2023-12-22 VITALS
TEMPERATURE: 97.8 F | OXYGEN SATURATION: 97 % | DIASTOLIC BLOOD PRESSURE: 78 MMHG | SYSTOLIC BLOOD PRESSURE: 124 MMHG | BODY MASS INDEX: 26.26 KG/M2 | HEIGHT: 65 IN | WEIGHT: 157.6 LBS | HEART RATE: 100 BPM

## 2023-12-22 DIAGNOSIS — G47.09 OTHER INSOMNIA: ICD-10-CM

## 2023-12-22 DIAGNOSIS — Z01.818 PRE-OP EVALUATION: Primary | ICD-10-CM

## 2023-12-22 PROCEDURE — 99214 OFFICE O/P EST MOD 30 MIN: CPT

## 2023-12-22 RX ORDER — ZOLPIDEM TARTRATE 5 MG/1
5 TABLET ORAL
Qty: 30 TABLET | Refills: 0 | Status: SHIPPED | OUTPATIENT
Start: 2023-12-22

## 2023-12-22 NOTE — PROGRESS NOTES
Name: Adryan Gifford      : 1975      MRN: [id number]  Encounter Provider: ANNE Menjivar  Encounter Date: 2023   Encounter department: [facility]    Assessment & Plan     1. Pre-op evaluation    2. Other insomnia  -     zolpidem (AMBIEN) 5 mg tablet; Take 1 tablet (5 mg total) by mouth daily at bedtime as needed for sleep    Pt requesting Ativan for insomnia. I discussed this is not best practice or first-line treatment. She states she took it in the past. Denies anxiety symptoms, SI or HI at this time.  Recommended Trazodone - pt states she also took this in the past when she was having cancer-related symptoms and this did not help. Reports improved sleep with Ambien.  Discussed side effects and safety profile with Ambien. Caution with current ex-fix and ambulatory aid. Call our office if you experience any side effects or concerns.  F/u in 1 month. Labs recently reviewed from 2023. Liver enzymes WNL.      Depression Screening and Follow-up Plan: Patient was screened for depression during today's encounter. They screened negative with a PHQ-2 score of 0.      Subjective      Patient is at low cardiac risk for the mentioned procedure. She is a current every day smoker and has PMH of hodgkin's lymphoma achieving remission. At this time, we do not have any further recommendations from a PCP standpoint. We will continue to follow-up with the patient postoperatively.    The following portions of the patient's history were reviewed and updated as appropriate: allergies, current medications, past family history, past medical history, past social history, past surgical history, and problem list.    Procedure date: 2023    Surgeon: Dr. Long  Planned procedure: REMOVAL EXTERNAL FIXATOR (EX FIX) (Right: Leg Upper)   Diagnosis for procedure: Lisfranc dislocation, right     Prior anesthesia: Yes   General; Complications:  None / Tolerated well  MAC; Complications:   None / Tolerated well  Local; Complications:  None / Tolerated well      Review of Systems   Constitutional:  Negative for chills, fatigue and fever.   HENT:  Negative for congestion, ear pain, facial swelling, hearing loss, rhinorrhea, sinus pressure, sneezing, sore throat and trouble swallowing.    Eyes:  Negative for pain, redness and visual disturbance.   Respiratory:  Negative for cough, chest tightness, shortness of breath and wheezing.    Cardiovascular:  Negative for chest pain and palpitations.   Gastrointestinal:  Negative for abdominal pain, diarrhea, nausea and vomiting.   Genitourinary:  Negative for dysuria, flank pain, hematuria and pelvic pain.   Musculoskeletal:  Negative for arthralgias, back pain and myalgias.   Skin:  Negative for color change and rash.   Neurological:  Negative for dizziness, seizures, syncope, weakness, light-headedness and headaches.   Psychiatric/Behavioral:  Negative for confusion, hallucinations and sleep disturbance. The patient is not nervous/anxious.    All other systems reviewed and are negative.      Current Outpatient Medications on File Prior to Visit   Medication Sig    Cholecalciferol 1.25 MG (07739 UT) capsule Take 1 capsule (50,000 Units total) by mouth once a week for 8 doses    oxyCODONE-acetaminophen (Percocet) 5-325 mg per tablet Take 1 tablet by mouth every 4 (four) hours as needed for moderate pain Max Daily Amount: 6 tablets    calcium carbonate-vitamin D 500 mg-5 mcg per tablet Take 1 tablet by mouth daily with breakfast (Patient not taking: Reported on 12/22/2023)    gabapentin (Neurontin) 300 mg capsule Take 1 capsule (300 mg total) by mouth 3 (three) times a day (Patient not taking: Reported on 12/22/2023)    naloxone (NARCAN) 4 mg/0.1 mL nasal spray Administer 1 spray into a nostril. If no response after 2-3 minutes, give another dose in the other nostril using a new spray. (Patient not taking: Reported on 12/22/2023)    [DISCONTINUED] aspirin 325 mg  "tablet Take 1 tablet (325 mg total) by mouth daily for 28 days (Patient not taking: Reported on 12/22/2023)    [DISCONTINUED] traMADol (Ultram) 50 mg tablet Take 1 tablet (50 mg total) by mouth every 6 (six) hours as needed for moderate pain (Patient not taking: Reported on 12/22/2023)       Objective     /78   Pulse 100   Temp 97.8 °F (36.6 °C)   Ht 5' 5\" (1.651 m)   Wt 71.5 kg (157 lb 9.6 oz)   LMP  (LMP Unknown)   SpO2 97%   BMI 26.23 kg/m²     Physical Exam  Vitals reviewed.   Constitutional:       General: She is awake. She is not in acute distress.     Appearance: Normal appearance. She is well-developed. She is not toxic-appearing.   HENT:      Head: Normocephalic.      Jaw: There is normal jaw occlusion.      Right Ear: Tympanic membrane normal. Tympanic membrane is not erythematous or bulging.      Left Ear: Tympanic membrane normal. Tympanic membrane is not erythematous or bulging.      Nose: No congestion or rhinorrhea.      Right Sinus: No maxillary sinus tenderness or frontal sinus tenderness.      Left Sinus: No maxillary sinus tenderness or frontal sinus tenderness.      Mouth/Throat:      Pharynx: Uvula midline. No oropharyngeal exudate, posterior oropharyngeal erythema or uvula swelling.      Tonsils: No tonsillar exudate or tonsillar abscesses.   Eyes:      Extraocular Movements: Extraocular movements intact.      Conjunctiva/sclera: Conjunctivae normal.      Pupils: Pupils are equal, round, and reactive to light.   Neck:      Thyroid: No thyroid mass.      Vascular: No JVD.      Trachea: Trachea and phonation normal.   Cardiovascular:      Rate and Rhythm: Normal rate and regular rhythm.      Pulses: Normal pulses.      Heart sounds: Normal heart sounds.   Pulmonary:      Effort: Pulmonary effort is normal. No tachypnea or respiratory distress.      Breath sounds: Normal breath sounds and air entry. No decreased breath sounds, wheezing, rhonchi or rales.   Chest:      Chest wall: No " tenderness.   Abdominal:      General: Bowel sounds are normal. There is no distension.      Palpations: Abdomen is soft.      Tenderness: There is no abdominal tenderness. There is no right CVA tenderness, left CVA tenderness, guarding or rebound.   Musculoskeletal:         General: Normal range of motion.      Cervical back: Normal range of motion.      Right lower leg: No edema.      Left lower leg: No edema.   Feet:      Comments:   Ex-fix in place  Using walker  Good cap refill in R foot  Lymphadenopathy:      Cervical: No cervical adenopathy.   Skin:     General: Skin is warm and dry.      Findings: No rash.   Neurological:      General: No focal deficit present.      Mental Status: She is alert and oriented to person, place, and time.      Sensory: Sensation is intact.      Motor: Motor function is intact.      Coordination: Coordination is intact.      Gait: Gait is intact.      Deep Tendon Reflexes: Reflexes are normal and symmetric.   Psychiatric:         Attention and Perception: Attention normal.         Mood and Affect: Mood normal.         Speech: Speech normal.         Behavior: Behavior normal. Behavior is cooperative.         Cognition and Memory: Cognition normal.       ANNE Menjivar

## 2023-12-22 NOTE — TELEPHONE ENCOUNTER
Patient called stating she thought you were sending in Ambien for her to sleep better? I do not see anything was sent. Were you sending that in for her?

## 2023-12-22 NOTE — H&P (VIEW-ONLY)
Name: Adryan Gifford      : 1975      MRN: [id number]  Encounter Provider: ANNE Menjivar  Encounter Date: 2023   Encounter department: [facility]    Assessment & Plan     1. Pre-op evaluation    2. Other insomnia  -     zolpidem (AMBIEN) 5 mg tablet; Take 1 tablet (5 mg total) by mouth daily at bedtime as needed for sleep    Pt requesting Ativan for insomnia. I discussed this is not best practice or first-line treatment. She states she took it in the past. Denies anxiety symptoms, SI or HI at this time.  Recommended Trazodone - pt states she also took this in the past when she was having cancer-related symptoms and this did not help. Reports improved sleep with Ambien.  Discussed side effects and safety profile with Ambien. Caution with current ex-fix and ambulatory aid. Call our office if you experience any side effects or concerns.  F/u in 1 month. Labs recently reviewed from 2023. Liver enzymes WNL.      Depression Screening and Follow-up Plan: Patient was screened for depression during today's encounter. They screened negative with a PHQ-2 score of 0.      Subjective      Patient is at low cardiac risk for the mentioned procedure. She is a current every day smoker and has PMH of hodgkin's lymphoma achieving remission. At this time, we do not have any further recommendations from a PCP standpoint. We will continue to follow-up with the patient postoperatively.    The following portions of the patient's history were reviewed and updated as appropriate: allergies, current medications, past family history, past medical history, past social history, past surgical history, and problem list.    Procedure date: 2023    Surgeon: Dr. Long  Planned procedure: REMOVAL EXTERNAL FIXATOR (EX FIX) (Right: Leg Upper)   Diagnosis for procedure: Lisfranc dislocation, right     Prior anesthesia: Yes   General; Complications:  None / Tolerated well  MAC; Complications:   None / Tolerated well  Local; Complications:  None / Tolerated well      Review of Systems   Constitutional:  Negative for chills, fatigue and fever.   HENT:  Negative for congestion, ear pain, facial swelling, hearing loss, rhinorrhea, sinus pressure, sneezing, sore throat and trouble swallowing.    Eyes:  Negative for pain, redness and visual disturbance.   Respiratory:  Negative for cough, chest tightness, shortness of breath and wheezing.    Cardiovascular:  Negative for chest pain and palpitations.   Gastrointestinal:  Negative for abdominal pain, diarrhea, nausea and vomiting.   Genitourinary:  Negative for dysuria, flank pain, hematuria and pelvic pain.   Musculoskeletal:  Negative for arthralgias, back pain and myalgias.   Skin:  Negative for color change and rash.   Neurological:  Negative for dizziness, seizures, syncope, weakness, light-headedness and headaches.   Psychiatric/Behavioral:  Negative for confusion, hallucinations and sleep disturbance. The patient is not nervous/anxious.    All other systems reviewed and are negative.      Current Outpatient Medications on File Prior to Visit   Medication Sig    Cholecalciferol 1.25 MG (17518 UT) capsule Take 1 capsule (50,000 Units total) by mouth once a week for 8 doses    oxyCODONE-acetaminophen (Percocet) 5-325 mg per tablet Take 1 tablet by mouth every 4 (four) hours as needed for moderate pain Max Daily Amount: 6 tablets    calcium carbonate-vitamin D 500 mg-5 mcg per tablet Take 1 tablet by mouth daily with breakfast (Patient not taking: Reported on 12/22/2023)    gabapentin (Neurontin) 300 mg capsule Take 1 capsule (300 mg total) by mouth 3 (three) times a day (Patient not taking: Reported on 12/22/2023)    naloxone (NARCAN) 4 mg/0.1 mL nasal spray Administer 1 spray into a nostril. If no response after 2-3 minutes, give another dose in the other nostril using a new spray. (Patient not taking: Reported on 12/22/2023)    [DISCONTINUED] aspirin 325 mg  "tablet Take 1 tablet (325 mg total) by mouth daily for 28 days (Patient not taking: Reported on 12/22/2023)    [DISCONTINUED] traMADol (Ultram) 50 mg tablet Take 1 tablet (50 mg total) by mouth every 6 (six) hours as needed for moderate pain (Patient not taking: Reported on 12/22/2023)       Objective     /78   Pulse 100   Temp 97.8 °F (36.6 °C)   Ht 5' 5\" (1.651 m)   Wt 71.5 kg (157 lb 9.6 oz)   LMP  (LMP Unknown)   SpO2 97%   BMI 26.23 kg/m²     Physical Exam  Vitals reviewed.   Constitutional:       General: She is awake. She is not in acute distress.     Appearance: Normal appearance. She is well-developed. She is not toxic-appearing.   HENT:      Head: Normocephalic.      Jaw: There is normal jaw occlusion.      Right Ear: Tympanic membrane normal. Tympanic membrane is not erythematous or bulging.      Left Ear: Tympanic membrane normal. Tympanic membrane is not erythematous or bulging.      Nose: No congestion or rhinorrhea.      Right Sinus: No maxillary sinus tenderness or frontal sinus tenderness.      Left Sinus: No maxillary sinus tenderness or frontal sinus tenderness.      Mouth/Throat:      Pharynx: Uvula midline. No oropharyngeal exudate, posterior oropharyngeal erythema or uvula swelling.      Tonsils: No tonsillar exudate or tonsillar abscesses.   Eyes:      Extraocular Movements: Extraocular movements intact.      Conjunctiva/sclera: Conjunctivae normal.      Pupils: Pupils are equal, round, and reactive to light.   Neck:      Thyroid: No thyroid mass.      Vascular: No JVD.      Trachea: Trachea and phonation normal.   Cardiovascular:      Rate and Rhythm: Normal rate and regular rhythm.      Pulses: Normal pulses.      Heart sounds: Normal heart sounds.   Pulmonary:      Effort: Pulmonary effort is normal. No tachypnea or respiratory distress.      Breath sounds: Normal breath sounds and air entry. No decreased breath sounds, wheezing, rhonchi or rales.   Chest:      Chest wall: No " tenderness.   Abdominal:      General: Bowel sounds are normal. There is no distension.      Palpations: Abdomen is soft.      Tenderness: There is no abdominal tenderness. There is no right CVA tenderness, left CVA tenderness, guarding or rebound.   Musculoskeletal:         General: Normal range of motion.      Cervical back: Normal range of motion.      Right lower leg: No edema.      Left lower leg: No edema.   Feet:      Comments:   Ex-fix in place  Using walker  Good cap refill in R foot  Lymphadenopathy:      Cervical: No cervical adenopathy.   Skin:     General: Skin is warm and dry.      Findings: No rash.   Neurological:      General: No focal deficit present.      Mental Status: She is alert and oriented to person, place, and time.      Sensory: Sensation is intact.      Motor: Motor function is intact.      Coordination: Coordination is intact.      Gait: Gait is intact.      Deep Tendon Reflexes: Reflexes are normal and symmetric.   Psychiatric:         Attention and Perception: Attention normal.         Mood and Affect: Mood normal.         Speech: Speech normal.         Behavior: Behavior normal. Behavior is cooperative.         Cognition and Memory: Cognition normal.       ANNE Menjivar

## 2023-12-22 NOTE — PROGRESS NOTES
Presurgical Evaluation    Subjective:      Patient ID: Adryan Gifford is a 48 y.o. female.    Chief Complaint   Patient presents with    Pre-op Exam     Would like to talk about anxiety and lack of sleep     Assessment/Plan:    Patient is at low cardiac risk for the mentioned procedure. She is a current every day smoker and has PMH of hodgkin's lymphoma achieving remission. At this time, we do not have any further recommendations from a PCP standpoint. We will continue to follow-up with the patient postoperatively.    The following portions of the patient's history were reviewed and updated as appropriate: allergies, current medications, past family history, past medical history, past social history, past surgical history, and problem list.    Procedure date: 12/28/2023    Surgeon: Dr. Long  Planned procedure: REMOVAL EXTERNAL FIXATOR (EX FIX) (Right: Leg Upper)   Diagnosis for procedure: Lisfranc dislocation, right     Prior anesthesia: Yes   General; Complications:  None / Tolerated well  MAC; Complications:  None / Tolerated well  Local; Complications:  None / Tolerated well    CAD History: None   NOTE: Patient should see Cardiology if time available before surgery, and if appropriate.    Pulmonary History: None    Renal history: None    Diabetes History:  None     Neurological History: None     On Immunosuppressant meds/biologics: No    Review of Systems   Constitutional:  Negative for chills, fatigue and fever.   HENT:  Negative for congestion, ear pain, facial swelling, hearing loss, rhinorrhea, sinus pressure, sneezing, sore throat and trouble swallowing.    Eyes:  Negative for pain, redness and visual disturbance.   Respiratory:  Negative for cough, chest tightness, shortness of breath and wheezing.    Cardiovascular:  Negative for chest pain and palpitations.   Gastrointestinal:  Negative for abdominal pain, diarrhea, nausea and vomiting.   Genitourinary:  Negative for dysuria, flank pain, hematuria and  pelvic pain.   Musculoskeletal:  Negative for arthralgias, back pain and myalgias.   Skin:  Negative for color change and rash.   Neurological:  Negative for dizziness, seizures, syncope, weakness, light-headedness and headaches.   Psychiatric/Behavioral:  Negative for confusion, hallucinations and sleep disturbance. The patient is not nervous/anxious.    All other systems reviewed and are negative.        Current Outpatient Medications   Medication Sig Dispense Refill    Cholecalciferol 1.25 MG (58675 UT) capsule Take 1 capsule (50,000 Units total) by mouth once a week for 8 doses 8 capsule 0    oxyCODONE-acetaminophen (Percocet) 5-325 mg per tablet Take 1 tablet by mouth every 4 (four) hours as needed for moderate pain Max Daily Amount: 6 tablets 10 tablet 0    calcium carbonate-vitamin D 500 mg-5 mcg per tablet Take 1 tablet by mouth daily with breakfast (Patient not taking: Reported on 12/22/2023) 90 tablet 1    gabapentin (Neurontin) 300 mg capsule Take 1 capsule (300 mg total) by mouth 3 (three) times a day (Patient not taking: Reported on 12/22/2023) 30 capsule 1    naloxone (NARCAN) 4 mg/0.1 mL nasal spray Administer 1 spray into a nostril. If no response after 2-3 minutes, give another dose in the other nostril using a new spray. (Patient not taking: Reported on 12/22/2023) 1 each 1     No current facility-administered medications for this visit.       Allergies on file:   Toradol [ketorolac tromethamine], Dilaudid  [hydromorphone hcl], Codeine, Dilaudid [hydromorphone hcl], Fentanyl, Morphine, Morphine and related, Phenergan [promethazine hcl], Reglan [metoclopramide], Cephalosporins, Codeine sulfate, and Penicillins    Patient Active Problem List   Diagnosis    Closed fracture of right foot with routine healing    Hodgkin's lymphoma (HCC)    Current every day smoker        Past Medical History:   Diagnosis Date    Back pain     Cancer (HCC)     History of transfusion     Hodgkin's disease (HCC)      "Hodgkin's lymphoma (HCC)     Migraine     Unspecified ovarian cysts        Past Surgical History:   Procedure Laterality Date    ANKLE LIGAMENT RECONSTRUCTION Left 1992    APPENDECTOMY      CHOLECYSTECTOMY      COLONOSCOPY      EGD      EXTERNAL FIXATOR APPLICATION Right 2023    Procedure: APPLICATION EXTERNAL FIXATION DEVICE LOWER EXTREMITY;  Surgeon: Jaime Long DPM;  Location: [facility];  Service: Podiatry    HYSTERECTOMY      partial    LYMPH NODE BIOPSY      left neck    MEDIPORT INSERTION, SINGLE      MEDIPORT REMOVAL      SD OPEN TREATMENT METATARSAL FRACTURE EACH Right 2023    Procedure: OPEN REDUCTION W/ INTERNAL FIXATION METATARSAL FRACTURES 2 AND 3.(ORIF) FOOT;  Surgeon: Jaime Long DPM;  Location: [facility];  Service: Podiatry    TUBAL LIGATION         Family History   Problem Relation Age of Onset    Heart disease Mother     Hypertension Mother     Asthma Mother     COPD Mother     Heart disease Father     Hypertension Father     Cancer Father        Social History     Tobacco Use    Smoking status: Every Day     Current packs/day: 0.00     Types: Cigarettes     Last attempt to quit: 3/24/2019     Years since quittin.7    Smokeless tobacco: Never    Tobacco comments:     Advised not to smoke prior to procedure  one pack daily   Vaping Use    Vaping status: Former   Substance Use Topics    Alcohol use: Not Currently    Drug use: Never     Comment: smokes cigarettes only       Objective:    Vitals:    23 0904   BP: 124/78   Pulse: 100   Temp: 97.8 °F (36.6 °C)   SpO2: 97%   Weight: 71.5 kg (157 lb 9.6 oz)   Height: 5' 5\" (1.651 m)        Physical Exam  Vitals reviewed.   Constitutional:       General: She is awake. She is not in acute distress.     Appearance: Normal appearance. She is well-developed. She is not toxic-appearing.   HENT:      Head: Normocephalic.      Jaw: There is normal jaw occlusion.      Right Ear: Tympanic membrane normal. " Tympanic membrane is not erythematous or bulging.      Left Ear: Tympanic membrane normal. Tympanic membrane is not erythematous or bulging.      Nose: No congestion or rhinorrhea.      Right Sinus: No maxillary sinus tenderness or frontal sinus tenderness.      Left Sinus: No maxillary sinus tenderness or frontal sinus tenderness.      Mouth/Throat:      Pharynx: Uvula midline. No oropharyngeal exudate, posterior oropharyngeal erythema or uvula swelling.      Tonsils: No tonsillar exudate or tonsillar abscesses.   Eyes:      Extraocular Movements: Extraocular movements intact.      Conjunctiva/sclera: Conjunctivae normal.      Pupils: Pupils are equal, round, and reactive to light.   Neck:      Thyroid: No thyroid mass.      Vascular: No JVD.      Trachea: Trachea and phonation normal.   Cardiovascular:      Rate and Rhythm: Normal rate and regular rhythm.      Pulses: Normal pulses.      Heart sounds: Normal heart sounds.   Pulmonary:      Effort: Pulmonary effort is normal. No tachypnea or respiratory distress.      Breath sounds: Normal breath sounds and air entry. No decreased breath sounds, wheezing, rhonchi or rales.   Chest:      Chest wall: No tenderness.   Abdominal:      General: Bowel sounds are normal. There is no distension.      Palpations: Abdomen is soft.      Tenderness: There is no abdominal tenderness. There is no right CVA tenderness, left CVA tenderness, guarding or rebound.   Musculoskeletal:         General: Normal range of motion.      Cervical back: Normal range of motion.      Right lower leg: No edema.      Left lower leg: No edema.   Feet:      Comments:   Ex-fix in place & wrapped  +good cap refill in R foot  Using walker  Lymphadenopathy:      Cervical: No cervical adenopathy.   Skin:     General: Skin is warm and dry.      Findings: No rash.   Neurological:      General: No focal deficit present.      Mental Status: She is alert and oriented to person, place, and time.      Sensory:  "Sensation is intact.      Motor: Motor function is intact.      Coordination: Coordination is intact.      Gait: Gait is intact.      Deep Tendon Reflexes: Reflexes are normal and symmetric.   Psychiatric:         Attention and Perception: Attention normal.         Mood and Affect: Mood normal.         Speech: Speech normal.         Behavior: Behavior normal. Behavior is cooperative.         Cognition and Memory: Cognition normal.         Preop labs/testing available and reviewed: no           EKG no    Echo no    Stress test/cath no    For PCP:  If GFR>60, Hold ACE/ARB/Diuretic on the day of surgery, and NSAIDS 10 days before.    If GFR<45, Consider PRE and POST op Nephrology Consult.    If 46 <GFR> 59 : Has Patient had MERARI in last 6 Months? no   If YES: Preop Nephrology consult   If No:  Post Op Nephrology consult.     Problem List Items Addressed This Visit    None  Visit Diagnoses       Pre-op evaluation    -  Primary             Diagnoses and all orders for this visit:    Pre-op evaluation             NOTE: Please use the above to review important meds for your specialty, the remainder \"per anesthesia Guidelines.\"    NOTE: Please place an Inbasket message for \"SOC\" pool for complicated patients.     "

## 2023-12-26 NOTE — PRE-PROCEDURE INSTRUCTIONS
Pre-Surgery Instructions:   Medication Instructions    zolpidem (AMBIEN) 5 mg tablet Take night before surgery      Medication instructions for day surgery reviewed. Please use only a sip of water to take your instructed medications. Avoid all over the counter vitamins, supplements and NSAIDS for one week prior to surgery per anesthesia guidelines. Tylenol is ok to take as needed.     You will receive a call one business day prior to surgery with an arrival time and hospital directions. If your surgery is scheduled on a Monday, the hospital will be calling you on the Friday prior to your surgery. If you have not heard from anyone by 8pm, please call the hospital supervisor through the hospital  at [phone]. (John [phone]).    Do not eat or drink anything after midnight the night before your surgery, including candy, mints, lifesavers, or chewing gum. Do not drink alcohol 24hrs before your surgery. Try not to smoke at least 24hrs before your surgery.       Follow the pre surgery showering instructions as listed in the “My Surgical Experience Booklet” or otherwise provided by your surgeon's office. Do not use a blade to shave the surgical area 1 week before surgery. It is okay to use a clean electric clippers up to 24 hours before surgery. Do not apply any lotions, creams, including makeup, cologne, deodorant, or perfumes after showering on the day of your surgery. Do not use dry shampoo, hair spray, hair gel, or any type of hair products.     No contact lenses, eye make-up, or artificial eyelashes. Remove nail polish, including gel polish, and any artificial, gel, or acrylic nails if possible. Remove all jewelry including rings and body piercing jewelry.     Wear causal clothing that is easy to take on and off. Consider your type of surgery.    Keep any valuables, jewelry, piercings at home. Please bring any specially ordered equipment (sling, braces) if indicated.    Arrange for a responsible  person to drive you to and from the hospital on the day of your surgery. Visitor Guidelines discussed.     Call the surgeon's office with any new illnesses, exposures, or additional questions prior to surgery.    Please reference your “My Surgical Experience Booklet” for additional information to prepare for your upcoming surgery.

## 2023-12-28 ENCOUNTER — HOSPITAL ENCOUNTER (OUTPATIENT)
Facility: HOSPITAL | Age: 48
Setting detail: OUTPATIENT SURGERY
Discharge: HOME | End: 2023-12-28
Attending: PODIATRIST | Admitting: PODIATRIST
Payer: COMMERCIAL

## 2023-12-28 ENCOUNTER — ANESTHESIA (OUTPATIENT)
Dept: PERIOP | Facility: HOSPITAL | Age: 48
End: 2023-12-28
Payer: COMMERCIAL

## 2023-12-28 ENCOUNTER — APPOINTMENT (OUTPATIENT)
Dept: RADIOLOGY | Facility: HOSPITAL | Age: 48
End: 2023-12-28
Payer: COMMERCIAL

## 2023-12-28 ENCOUNTER — ANESTHESIA EVENT (OUTPATIENT)
Dept: PERIOP | Facility: HOSPITAL | Age: 48
End: 2023-12-28
Payer: COMMERCIAL

## 2023-12-28 VITALS
SYSTOLIC BLOOD PRESSURE: 100 MMHG | HEIGHT: 65 IN | WEIGHT: 157 LBS | RESPIRATION RATE: 18 BRPM | TEMPERATURE: 97.6 F | BODY MASS INDEX: 26.16 KG/M2 | HEART RATE: 100 BPM | DIASTOLIC BLOOD PRESSURE: 65 MMHG | OXYGEN SATURATION: 97 %

## 2023-12-28 PROCEDURE — 20694 RMVL EXT FIXJ SYS UNDER ANES: CPT | Performed by: PODIATRIST

## 2023-12-28 PROCEDURE — 73620 X-RAY EXAM OF FOOT: CPT

## 2023-12-28 RX ORDER — CHLORHEXIDINE GLUCONATE ORAL RINSE 1.2 MG/ML
15 SOLUTION DENTAL ONCE
Status: COMPLETED | OUTPATIENT
Start: 2023-12-28 | End: 2023-12-28

## 2023-12-28 RX ORDER — ACETAMINOPHEN 325 MG/1
650 TABLET ORAL EVERY 4 HOURS PRN
Status: DISCONTINUED | OUTPATIENT
Start: 2023-12-28 | End: 2023-12-28 | Stop reason: HOSPADM

## 2023-12-28 RX ORDER — ONDANSETRON 2 MG/ML
INJECTION INTRAMUSCULAR; INTRAVENOUS AS NEEDED
Status: DISCONTINUED | OUTPATIENT
Start: 2023-12-28 | End: 2023-12-28

## 2023-12-28 RX ORDER — VANCOMYCIN HYDROCHLORIDE 1 G/200ML
1000 INJECTION, SOLUTION INTRAVENOUS ONCE
Status: COMPLETED | OUTPATIENT
Start: 2023-12-28 | End: 2023-12-28

## 2023-12-28 RX ORDER — ONDANSETRON 2 MG/ML
4 INJECTION INTRAMUSCULAR; INTRAVENOUS ONCE AS NEEDED
Status: DISCONTINUED | OUTPATIENT
Start: 2023-12-28 | End: 2023-12-28 | Stop reason: HOSPADM

## 2023-12-28 RX ORDER — LIDOCAINE HYDROCHLORIDE 20 MG/ML
INJECTION, SOLUTION EPIDURAL; INFILTRATION; INTRACAUDAL; PERINEURAL AS NEEDED
Status: DISCONTINUED | OUTPATIENT
Start: 2023-12-28 | End: 2023-12-28 | Stop reason: HOSPADM

## 2023-12-28 RX ORDER — PROPOFOL 10 MG/ML
INJECTION, EMULSION INTRAVENOUS CONTINUOUS PRN
Status: DISCONTINUED | OUTPATIENT
Start: 2023-12-28 | End: 2023-12-28

## 2023-12-28 RX ORDER — SODIUM CHLORIDE, SODIUM LACTATE, POTASSIUM CHLORIDE, CALCIUM CHLORIDE 600; 310; 30; 20 MG/100ML; MG/100ML; MG/100ML; MG/100ML
INJECTION, SOLUTION INTRAVENOUS CONTINUOUS PRN
Status: DISCONTINUED | OUTPATIENT
Start: 2023-12-28 | End: 2023-12-28

## 2023-12-28 RX ORDER — MIDAZOLAM HYDROCHLORIDE 2 MG/2ML
INJECTION, SOLUTION INTRAMUSCULAR; INTRAVENOUS AS NEEDED
Status: DISCONTINUED | OUTPATIENT
Start: 2023-12-28 | End: 2023-12-28

## 2023-12-28 RX ORDER — CHLORHEXIDINE GLUCONATE 4 G/100ML
SOLUTION TOPICAL DAILY PRN
Status: DISCONTINUED | OUTPATIENT
Start: 2023-12-28 | End: 2023-12-28 | Stop reason: HOSPADM

## 2023-12-28 RX ORDER — FENTANYL CITRATE/PF 50 MCG/ML
25 SYRINGE (ML) INJECTION
Status: DISCONTINUED | OUTPATIENT
Start: 2023-12-28 | End: 2023-12-28 | Stop reason: HOSPADM

## 2023-12-28 RX ORDER — SODIUM CHLORIDE, SODIUM LACTATE, POTASSIUM CHLORIDE, CALCIUM CHLORIDE 600; 310; 30; 20 MG/100ML; MG/100ML; MG/100ML; MG/100ML
125 INJECTION, SOLUTION INTRAVENOUS CONTINUOUS
Status: DISCONTINUED | OUTPATIENT
Start: 2023-12-28 | End: 2023-12-28 | Stop reason: HOSPADM

## 2023-12-28 RX ORDER — SODIUM CHLORIDE, SODIUM LACTATE, POTASSIUM CHLORIDE, CALCIUM CHLORIDE 600; 310; 30; 20 MG/100ML; MG/100ML; MG/100ML; MG/100ML
20 INJECTION, SOLUTION INTRAVENOUS CONTINUOUS
Status: DISCONTINUED | OUTPATIENT
Start: 2023-12-28 | End: 2023-12-28 | Stop reason: HOSPADM

## 2023-12-28 RX ORDER — FENTANYL CITRATE 50 UG/ML
INJECTION, SOLUTION INTRAMUSCULAR; INTRAVENOUS AS NEEDED
Status: DISCONTINUED | OUTPATIENT
Start: 2023-12-28 | End: 2023-12-28

## 2023-12-28 RX ORDER — PHENYLEPHRINE HCL IN 0.9% NACL 1 MG/10 ML
SYRINGE (ML) INTRAVENOUS AS NEEDED
Status: DISCONTINUED | OUTPATIENT
Start: 2023-12-28 | End: 2023-12-28

## 2023-12-28 RX ORDER — ONDANSETRON 2 MG/ML
4 INJECTION INTRAMUSCULAR; INTRAVENOUS EVERY 6 HOURS PRN
Status: DISCONTINUED | OUTPATIENT
Start: 2023-12-28 | End: 2023-12-28 | Stop reason: HOSPADM

## 2023-12-28 RX ORDER — OXYCODONE HYDROCHLORIDE 5 MG/1
5 TABLET ORAL EVERY 4 HOURS PRN
Status: DISCONTINUED | OUTPATIENT
Start: 2023-12-28 | End: 2023-12-28 | Stop reason: HOSPADM

## 2023-12-28 RX ADMIN — VANCOMYCIN HYDROCHLORIDE 1000 MG: 1 INJECTION, SOLUTION INTRAVENOUS at 09:03

## 2023-12-28 RX ADMIN — FENTANYL CITRATE 25 MCG: 50 INJECTION, SOLUTION INTRAMUSCULAR; INTRAVENOUS at 09:06

## 2023-12-28 RX ADMIN — MIDAZOLAM HYDROCHLORIDE 2 MG: 1 INJECTION, SOLUTION INTRAMUSCULAR; INTRAVENOUS at 09:03

## 2023-12-28 RX ADMIN — PROPOFOL 70 MG: 10 INJECTION, EMULSION INTRAVENOUS at 09:06

## 2023-12-28 RX ADMIN — PROPOFOL 120 MCG/KG/MIN: 10 INJECTION, EMULSION INTRAVENOUS at 09:11

## 2023-12-28 RX ADMIN — Medication 100 MCG: at 09:21

## 2023-12-28 RX ADMIN — DEXMEDETOMIDINE 4 MCG: 100 INJECTION, SOLUTION, CONCENTRATE INTRAVENOUS at 09:06

## 2023-12-28 RX ADMIN — SODIUM CHLORIDE, SODIUM LACTATE, POTASSIUM CHLORIDE, AND CALCIUM CHLORIDE: .6; .31; .03; .02 INJECTION, SOLUTION INTRAVENOUS at 09:03

## 2023-12-28 RX ADMIN — ONDANSETRON HYDROCHLORIDE 4 MG: 2 INJECTION, SOLUTION INTRAVENOUS at 09:26

## 2023-12-28 RX ADMIN — CHLORHEXIDINE GLUCONATE 15 ML: 1.2 RINSE ORAL at 08:49

## 2023-12-28 RX ADMIN — SODIUM CHLORIDE, SODIUM LACTATE, POTASSIUM CHLORIDE, AND CALCIUM CHLORIDE 125 ML/HR: .6; .31; .03; .02 INJECTION, SOLUTION INTRAVENOUS at 08:50

## 2023-12-28 NOTE — INTERVAL H&P NOTE
H&P reviewed. After examining the patient I find no changes in the patients condition since the H&P had been written.    Vitals:    12/28/23 0841   BP: 111/73   Pulse: 100   Resp: 18   Temp: 98.9 °F (37.2 °C)   SpO2: 94%

## 2023-12-28 NOTE — OP NOTE
OPERATIVE REPORT  PATIENT NAME: Adryan Gifford    :  1975  MRN: [id number]  Pt Location: [facility] OR ROOM 02    SURGERY DATE: 2023    Surgeons and Role:     * Jaime Long DPM - Primary    Preop Diagnosis:  Lisfranc dislocation, right, initial encounter [S93.324A]    Post-Op Diagnosis Codes:     * Lisfranc dislocation, right, initial encounter [S93.324A]    Procedure(s):  Right - REMOVAL EXTERNAL FIXATOR (EX FIX)    Specimen(s):  * No specimens in log *    Estimated Blood Loss:   Minimal    Drains:  * No LDAs found *    Anesthesia Type:   Conscious Sedation     Operative Indications:  Lisfranc dislocation, right, initial encounter [S93.324A]      Operative Findings:  C/w dx    Complications:   None    Procedure and Technique:  Patient was brought the operating room and placed on the operating table in supine position following IV sedation local anesthesia was obtained on patient's right foot utilizing lidocaine plain.  The foot was then scrubbed prepped and draped semisterilely, a full timeout was then performed identify the correct patient location and procedure.  Utilizing a Kocher, the K wires were then removed as well as the mini rail    C arm was then utilized to confirm removal of all fixation.    Foot was then dressed with Adaptic, 4 x 4's, Best, Coban    Weight-bear as tolerated with crutch protection in cam boot     I was present for the entire procedure.    Patient Disposition:  PACU  and extubated and stable        SIGNATURE: Jaime Long DPM  DATE: 2023  TIME: 9:32 AM

## 2023-12-28 NOTE — DISCHARGE INSTR - AVS FIRST PAGE
Dr. Long, DPM  Post-op surgery Instructions    Pain / Swelling  There is expected to be some discomfort, swelling and bruising of the foot. You might see some blood on the bandage. This is not a cause for alarm. However, if there is active or persistent bleeding (blood running out of the bandage while at rest) - call the office at once (or) go to a [facility] ER and ask them to page the podiatry residents.  Apply an ice bag to the top of your ankle for 30 minutes for each waking hour, for the first 72 hours. This should be discontinued when sleeping. This will also work through your cast if you have one. Ice must not leak and wet the dressings. Also, using the ice inappropriately can cause permanent nerve damage.  Your foot should be elevated as much as possible for the first 72 hours. The foot should be above heart level. If your foot is below heart level, throbbing and pain will increase.  When sleeping, elevation can be accomplished by putting a small hard suitcase between the box spring and mattress at the foot of the bed.  Walking and standing will increase pain, throbbing and bleeding.  Persistent pain despite elevation and your pain meds can many times be relieved by removing the tight brown compression layer (called the ACE wrap) that is over the white gauze dressing. If you are elevating and taking your pain meds and pain is still severe, remove this brown stretchy layer but leave the gauze intact. Wait 30 minutes. If the pain subsides, reapply the ACE so it’s not so tight. If pain doesn’t get better, call your doctor.   Dressings / Casts  Do not remove your surgical dressings - they will be changed at your doctor appointment. Do not allow surgical dressings to get wet. Sponge baths should be used until the sutures are removed.  Do not try to keep the foot dry using a garbage bag and tape - this rarely works.  If you get your dressings or cast wet - call your doctor immediately.  If your cast or  dressings feel tight - elevate your foot for 30 minutes. If this doesn’t help and you feel tingling or see toe discoloration - call your doctor or go to a [facility] ER and ask them to page the podiatry residents.   Do not put things in your cast such as powder, coat hangers to scratch, etc.. This can cause skin damage and infections.   Infection  If you have a fever at or above 100 degrees, chills, sweats, or see red streaks rising above the dressing or smell odor / see pus (creamy white drainage), call your doctor immediately or go to a [facility] ER and ask them to page the podiatry residents.  Constipation  If you have severe constipation after surgery, this can be due to the pain medication. Notify your doctor and special medication will be prescribed to deal with this.     Numbness  It is normal for your foot to be numb until about dinner time. If you’ve had a popliteal block procedure, you might be numb until the following day. When you start to feel pins and needles in the foot - this means the block is wearing off. That is the appropriate time to take your pain medication.   Pain Medication  Do not supplement your pain medication with over the counter drugs, old leftover pain medications, or extra Tylenol. You must discuss any additional medications with your doctor prior to taking them for pain.   Driving  No driving is allowed without discussion with the doctor  Ambulation  Weight bear as tolerated to surgical foot in CAM boot with crutch proteection  Use a device (cane, walker, crutches) to take some weight off of the foot when walking

## 2023-12-28 NOTE — ANESTHESIA POSTPROCEDURE EVALUATION
Post-Op Assessment Note    CV Status:  Stable  Pain Score: 0    Pain management: adequate       Mental Status:  Alert and awake   Hydration Status:  Euvolemic   PONV Controlled:  Controlled   Airway Patency:  Patent     Post Op Vitals Reviewed: Yes      Staff: CRNA               BP   99/50   Temp 99   Pulse 70   Resp 12   SpO2 98   Pt expressed gratitude and vss.

## 2024-01-03 ENCOUNTER — TELEPHONE (OUTPATIENT)
Dept: OBGYN CLINIC | Facility: HOSPITAL | Age: 49
End: 2024-01-03

## 2024-01-03 NOTE — TELEPHONE ENCOUNTER
Caller: Patient    Doctor: Ethan    Reason for call: Patient has an appt on 1/5/24.  Her ride just started a new job and she can't come in before 3pm.  She is asking for us to send a message to see if she can be fit in on 1/4/24.  Please advise.    Call back#: [phone]

## 2024-01-04 ENCOUNTER — OFFICE VISIT (OUTPATIENT)
Dept: PODIATRY | Facility: CLINIC | Age: 49
End: 2024-01-04

## 2024-01-04 VITALS — SYSTOLIC BLOOD PRESSURE: 130 MMHG | HEART RATE: 125 BPM | DIASTOLIC BLOOD PRESSURE: 94 MMHG

## 2024-01-04 DIAGNOSIS — S93.324A LISFRANC DISLOCATION, RIGHT, INITIAL ENCOUNTER: Primary | ICD-10-CM

## 2024-01-04 PROCEDURE — 99024 POSTOP FOLLOW-UP VISIT: CPT | Performed by: PODIATRIST

## 2024-01-04 NOTE — PROGRESS NOTES
Assessment/Plan:      Diagnoses and all orders for this visit:    Lisfranc dislocation, right, initial encounter      -Doing very well at this point, weight-bear as tolerated in surgical shoe, she may return to a shoe when she is ready, she is to return in 4 to 6 weeks for repeat x-rays and assessment of her pain if she is still having significant pain we may need to stabilize the Lisfranc area    Subjective:     Patient ID: Adryan Gifford is a 48 y.o. female.    Patient transfer evaluation management status post external fixator removal, incisions are healed, no evidence of dehiscence, reporting some pain along the metatarsal fracture sites and at the midfoot mild ambulating surgical shoe        Review of Systems   Constitutional:  Negative for chills and fever.   HENT:  Negative for ear pain and sore throat.    Eyes:  Negative for pain and visual disturbance.   Respiratory:  Negative for cough and shortness of breath.    Cardiovascular:  Negative for chest pain and palpitations.   Gastrointestinal:  Negative for abdominal pain and vomiting.   Genitourinary:  Negative for dysuria and hematuria.   Musculoskeletal:  Negative for arthralgias and back pain.   Skin:  Negative for color change and rash.   Neurological:  Negative for seizures and syncope.   All other systems reviewed and are negative.        Objective:     Physical Exam  Musculoskeletal:      Comments: There is some slight pain with stressing of the Lisfranc area    There is limited range of motion of digits 2 and 3 associated with likely pins and metatarsal heads, pain with palpation of metatarsal shaft swelling normal and consistent postoperative course

## 2024-01-12 ENCOUNTER — TELEPHONE (OUTPATIENT)
Age: 49
End: 2024-01-12

## 2024-01-12 ENCOUNTER — TELEPHONE (OUTPATIENT)
Dept: PODIATRY | Facility: CLINIC | Age: 49
End: 2024-01-12

## 2024-01-12 NOTE — TELEPHONE ENCOUNTER
Caller: Adryan Gifford    Doctor/Office: /C[facility]ian    #: [phone]    Escalation: Care Patient would like a return call to discuss her foot. She is in a lot of pain, both on the top and bottom of her foot. She can barely keep a shoe on. Swollen. Please return call and advise. Thank you

## 2024-01-17 ENCOUNTER — APPOINTMENT (OUTPATIENT)
Dept: RADIOLOGY | Facility: CLINIC | Age: 49
End: 2024-01-17
Payer: COMMERCIAL

## 2024-01-17 ENCOUNTER — OFFICE VISIT (OUTPATIENT)
Dept: PODIATRY | Facility: CLINIC | Age: 49
End: 2024-01-17
Payer: COMMERCIAL

## 2024-01-17 VITALS
WEIGHT: 157 LBS | DIASTOLIC BLOOD PRESSURE: 87 MMHG | HEART RATE: 97 BPM | BODY MASS INDEX: 26.13 KG/M2 | SYSTOLIC BLOOD PRESSURE: 124 MMHG

## 2024-01-17 DIAGNOSIS — S92.321A CLOSED DISPLACED FRACTURE OF SECOND METATARSAL BONE OF RIGHT FOOT, INITIAL ENCOUNTER: Primary | ICD-10-CM

## 2024-01-17 DIAGNOSIS — S92.321A CLOSED DISPLACED FRACTURE OF SECOND METATARSAL BONE OF RIGHT FOOT, INITIAL ENCOUNTER: ICD-10-CM

## 2024-01-17 PROCEDURE — 99213 OFFICE O/P EST LOW 20 MIN: CPT | Performed by: PODIATRIST

## 2024-01-17 PROCEDURE — 73630 X-RAY EXAM OF FOOT: CPT

## 2024-01-17 NOTE — PROGRESS NOTES
Assessment/Plan:    No problem-specific Assessment & Plan notes found for this encounter.       Diagnoses and all orders for this visit:    Closed displaced fracture of second metatarsal bone of right foot, initial encounter  -     X-ray foot right 3+ views; Future      -X-rays 3 views weightbearing today reviewed the right foot and do show significant and good osseous consolidation across the fracture sites of the neck fractures of 2 and 3, Lisfranc ligament appears to be well repaired and there seems to be no gap which for second TMT J, there is slight dorsiflexion of the base of the first metatarsal but I am not sure that this is pathologic  - At this point considering the nature of her injury she is actually doing overall pretty well, I think she is having some delayed healing of the bases of the 2, 3, 4 metatarsal fractures and probably a little bit of swelling and pain considering the nature of this injury and the high-energy nature of it  - We will immobilize her in cam boot for the next 3 weeks she is to ice, can take anti-inflammatories, use Tylenol and she was to return in 3 to 4 weeks for repeat assessment of this area  -As it stands for now, I do not plan on intervening surgically again her Lisfranc ligament appears to be within normal limits and she may need delayed fusion down the line for posttraumatic arthritis    Subjective:      Patient ID: Adryan Gifford is a 48 y.o. female.    Patient transfer evaluation management of her right foot, she is ambulating in sketchers with rigid shoes, with assistance of a walker, she did have her external fixator removed from her right foot on 12/28.  She notes swelling pain and issues of the both forefoot and midfoot.        The following portions of the patient's history were reviewed and updated as appropriate: allergies, current medications, past family history, past medical history, past social history, past surgical history, and problem list.    Review of  Systems   Constitutional:  Negative for chills and fever.   HENT:  Negative for ear pain and sore throat.    Eyes:  Negative for pain and visual disturbance.   Respiratory:  Negative for cough and shortness of breath.    Cardiovascular:  Negative for chest pain and palpitations.   Gastrointestinal:  Negative for abdominal pain and vomiting.   Genitourinary:  Negative for dysuria and hematuria.   Musculoskeletal:  Negative for arthralgias and back pain.   Skin:  Negative for color change and rash.   Neurological:  Negative for seizures and syncope.   All other systems reviewed and are negative.        Objective:      /87 (BP Location: Left arm, Patient Position: Sitting, Cuff Size: Standard)   Pulse 97   Wt 71.2 kg (157 lb)   LMP  (LMP Unknown)   BMI 26.13 kg/m²          Physical Exam  Musculoskeletal:      Comments: Pin sites are healed within normal limits, there is tenderness with palpation and attempted range of motion of the neck fractures of right 2 and 3, no pain with range of motion of the actual digits.  There is mild pain with palpation of the Lisfranc ligament but her base fractures of 2 3 and 4 seem to be much more painful with attempted range of motion

## 2024-01-22 ENCOUNTER — TELEPHONE (OUTPATIENT)
Age: 49
End: 2024-01-22

## 2024-01-22 DIAGNOSIS — G47.09 OTHER INSOMNIA: ICD-10-CM

## 2024-01-22 NOTE — TELEPHONE ENCOUNTER
Caller: Adryan Gifford    Doctor: Dr. Long    Reason for call: pain/checked to see when message was sent/1218pm today-she said do not send again/just was asking when she would receive a call back which I cannot tell her.     Call back#: [phone]  
Eduardo: Adryan     Doctor/Office: Ethan/C[facility]ian     #: [phone]    Escalation: Care Patient is in a lot of pain and is in tears would like to know if there is something she can get for the pain   
bridge dislodged after intubation
htn  afib

## 2024-01-24 RX ORDER — ZOLPIDEM TARTRATE 5 MG/1
5 TABLET ORAL
Qty: 30 TABLET | Refills: 0 | Status: SHIPPED | OUTPATIENT
Start: 2024-01-24

## 2024-02-07 ENCOUNTER — TELEPHONE (OUTPATIENT)
Age: 49
End: 2024-02-07

## 2024-02-07 ENCOUNTER — TELEPHONE (OUTPATIENT)
Dept: PODIATRY | Facility: CLINIC | Age: 49
End: 2024-02-07

## 2024-02-07 NOTE — TELEPHONE ENCOUNTER
Hello,  Please advise if the following patient can be forced onto the schedule:    Patient: Adryan Gifford    : 1975    MRN: [id number]    Call back #: [phone]    Insurance: [facility] for you    Reason for appointment: Post op/cannot make appt today as have no gas-need to be RS to either /Friday. I have nothing to offer.     Requested doctor/location: Dr. Long/[facility]      Thank you.

## 2024-02-15 ENCOUNTER — OFFICE VISIT (OUTPATIENT)
Dept: PODIATRY | Facility: CLINIC | Age: 49
End: 2024-02-15
Payer: COMMERCIAL

## 2024-02-15 ENCOUNTER — APPOINTMENT (OUTPATIENT)
Dept: RADIOLOGY | Facility: CLINIC | Age: 49
End: 2024-02-15
Payer: COMMERCIAL

## 2024-02-15 VITALS
HEART RATE: 98 BPM | DIASTOLIC BLOOD PRESSURE: 93 MMHG | BODY MASS INDEX: 26.13 KG/M2 | WEIGHT: 157 LBS | SYSTOLIC BLOOD PRESSURE: 146 MMHG

## 2024-02-15 DIAGNOSIS — M79.2 NEURITIS: ICD-10-CM

## 2024-02-15 DIAGNOSIS — M19.071 ARTHRITIS OF RIGHT MIDFOOT: ICD-10-CM

## 2024-02-15 DIAGNOSIS — S92.321A CLOSED DISPLACED FRACTURE OF SECOND METATARSAL BONE OF RIGHT FOOT, INITIAL ENCOUNTER: Primary | ICD-10-CM

## 2024-02-15 DIAGNOSIS — S92.321A CLOSED DISPLACED FRACTURE OF SECOND METATARSAL BONE OF RIGHT FOOT, INITIAL ENCOUNTER: ICD-10-CM

## 2024-02-15 PROCEDURE — 73630 X-RAY EXAM OF FOOT: CPT

## 2024-02-15 PROCEDURE — 99214 OFFICE O/P EST MOD 30 MIN: CPT | Performed by: PODIATRIST

## 2024-02-15 RX ORDER — GABAPENTIN 300 MG/1
300 CAPSULE ORAL
Qty: 30 CAPSULE | Refills: 2 | Status: SHIPPED | OUTPATIENT
Start: 2024-02-15

## 2024-02-15 NOTE — PROGRESS NOTES
Assessment/Plan:    No problem-specific Assessment & Plan notes found for this encounter.       Diagnoses and all orders for this visit:    Closed displaced fracture of second metatarsal bone of right foot, initial encounter  -     X-ray foot right 3+ views; Future    Arthritis of right midfoot      -X-rays 3 views weightbearing reviewed of the right foot and do show healed fractures 2 and 3 metatarsals, and good consolidation across the midfoot.  -Changed the lacing of her right shoe and she immediately felt relief.  Discussed flexible overlay and change of shoes.  -Advised compression stocking and she is return in 3 months for final sign off, doing overall very well  - We will place her in physical therapy to increase range of motion and also Rx given for gabapentin for management of her neuritis at night  -Rx in for diclofenac gel discussed managing that with CBD cream as well  - Return 3 months, discussed nicotine cessation      Subjective:      Patient ID: Adryan Gifford is a 48 y.o. female.    Patient transfer evaluation management of her right foot, she is complaining of stabbing pains in the right foot which are worse sitting at night or also in close toed shoes.  She notes that with supportive shoes a send do a little bit better.  She is no longer taking her gabapentin she ran out.  Has issues with close toed shoes and tying the right foot        The following portions of the patient's history were reviewed and updated as appropriate: allergies, current medications, past family history, past medical history, past social history, past surgical history, and problem list.    Review of Systems   Constitutional:  Negative for chills and fever.   HENT:  Negative for ear pain and sore throat.    Eyes:  Negative for pain and visual disturbance.   Respiratory:  Negative for cough and shortness of breath.    Cardiovascular:  Negative for chest pain and palpitations.   Gastrointestinal:  Negative for abdominal pain  and vomiting.   Genitourinary:  Negative for dysuria and hematuria.   Musculoskeletal:  Negative for arthralgias and back pain.   Skin:  Negative for color change and rash.   Neurological:  Negative for seizures and syncope.   All other systems reviewed and are negative.        Objective:      /93 (BP Location: Left arm, Patient Position: Sitting, Cuff Size: Standard)   Pulse 98   Wt 71.2 kg (157 lb)   LMP  (LMP Unknown)   BMI 26.13 kg/m²          Physical Exam  Vitals reviewed.   Constitutional:       Appearance: Normal appearance.   HENT:      Head: Normocephalic and atraumatic.      Nose: Nose normal.      Mouth/Throat:      Mouth: Mucous membranes are moist.   Eyes:      Pupils: Pupils are equal, round, and reactive to light.   Cardiovascular:      Pulses: Normal pulses.   Pulmonary:      Effort: Pulmonary effort is normal.   Musculoskeletal:      Comments: Diffuse warmth across the right midfoot, ROM intact      Decreased plantarflexion ability to the digits especially 2 and 3, no pain with PNO test across the midfoot.   Skin:     Capillary Refill: Capillary refill takes less than 2 seconds.   Neurological:      General: No focal deficit present.      Mental Status: She is alert and oriented to person, place, and time. Mental status is at baseline.

## 2024-02-26 DIAGNOSIS — G47.09 OTHER INSOMNIA: ICD-10-CM

## 2024-02-27 RX ORDER — ZOLPIDEM TARTRATE 5 MG/1
5 TABLET ORAL
Qty: 30 TABLET | Refills: 0 | Status: SHIPPED | OUTPATIENT
Start: 2024-02-27

## 2024-02-29 LAB
DME PARACHUTE DELIVERY DATE ACTUAL: NORMAL
DME PARACHUTE DELIVERY DATE REQUESTED: NORMAL
DME PARACHUTE ITEM DESCRIPTION: NORMAL
DME PARACHUTE ORDER STATUS: NORMAL
DME PARACHUTE SUPPLIER NAME: NORMAL
DME PARACHUTE SUPPLIER PHONE: NORMAL

## 2024-03-12 ENCOUNTER — TELEPHONE (OUTPATIENT)
Dept: PODIATRY | Facility: CLINIC | Age: 49
End: 2024-03-12

## 2024-03-12 DIAGNOSIS — S92.901D CLOSED FRACTURE OF RIGHT FOOT WITH ROUTINE HEALING: Primary | ICD-10-CM

## 2024-03-12 RX ORDER — GABAPENTIN 300 MG/1
300 CAPSULE ORAL 3 TIMES DAILY
Qty: 90 CAPSULE | Refills: 0 | Status: SHIPPED | OUTPATIENT
Start: 2024-03-12 | End: 2024-04-11

## 2024-03-12 NOTE — TELEPHONE ENCOUNTER
Hello,  Please advise if the following patient can be forced onto the schedule:    Patient: Adryan Gifford    : 75    MRN: [id number]    Call back #: [phone]    Insurance: [facility] for [facility]    Reason for appointment: Adryan needs to be seen sooner, if possible.  She cannot bend her toes and she is having a lot of pain in her right foot.  She cannot wait until her May appointment.    Requested doctor/location: Jaime Long DPM/Cristian or Emily    Also, Can her Rx for Gabapentin 300 mg be changed. She is supposed to be taking it once a day at bedtime, that did not help with the pain. She is taking it once every 8 hrs and that is helping but she is out of pills and is   She is requesting a new Rx for 3 times a day, if possible      Thank you.

## 2024-03-13 NOTE — TELEPHONE ENCOUNTER
Called Adryan to provide an update on Rx refill and to let her know we will be reviewing the schedule to see when we can get her in sooner to see the Doctor. No answer, left message requesting a call back.

## 2024-03-14 ENCOUNTER — TELEPHONE (OUTPATIENT)
Dept: PAIN MEDICINE | Facility: CLINIC | Age: 49
End: 2024-03-14

## 2024-05-30 ENCOUNTER — APPOINTMENT (EMERGENCY)
Dept: RADIOLOGY | Facility: HOSPITAL | Age: 49
End: 2024-05-30
Payer: COMMERCIAL

## 2024-05-30 ENCOUNTER — HOSPITAL ENCOUNTER (EMERGENCY)
Facility: HOSPITAL | Age: 49
Discharge: HOME | End: 2024-05-30
Attending: EMERGENCY MEDICINE
Payer: COMMERCIAL

## 2024-05-30 VITALS
RESPIRATION RATE: 20 BRPM | WEIGHT: 156.97 LBS | TEMPERATURE: 97.6 F | HEART RATE: 99 BPM | OXYGEN SATURATION: 96 % | HEIGHT: 65 IN | BODY MASS INDEX: 26.15 KG/M2 | SYSTOLIC BLOOD PRESSURE: 108 MMHG | DIASTOLIC BLOOD PRESSURE: 62 MMHG

## 2024-05-30 DIAGNOSIS — S93.601A SPRAIN OF RIGHT FOOT, INITIAL ENCOUNTER: ICD-10-CM

## 2024-05-30 DIAGNOSIS — M79.671 RIGHT FOOT PAIN: Primary | ICD-10-CM

## 2024-05-30 DIAGNOSIS — S92.901D CLOSED FRACTURE OF RIGHT FOOT WITH ROUTINE HEALING: ICD-10-CM

## 2024-05-30 PROCEDURE — 99283 EMERGENCY DEPT VISIT LOW MDM: CPT

## 2024-05-30 PROCEDURE — 99284 EMERGENCY DEPT VISIT MOD MDM: CPT | Performed by: PHYSICIAN ASSISTANT

## 2024-05-30 PROCEDURE — 73630 X-RAY EXAM OF FOOT: CPT

## 2024-05-30 RX ORDER — GABAPENTIN 300 MG/1
300 CAPSULE ORAL 3 TIMES DAILY
Qty: 90 CAPSULE | Refills: 0 | Status: SHIPPED | OUTPATIENT
Start: 2024-05-30 | End: 2024-06-29

## 2024-05-30 RX ORDER — OXYCODONE HYDROCHLORIDE 5 MG/1
5 TABLET ORAL ONCE
Status: COMPLETED | OUTPATIENT
Start: 2024-05-30 | End: 2024-05-30

## 2024-05-30 RX ADMIN — OXYCODONE HYDROCHLORIDE 5 MG: 5 TABLET ORAL at 15:05

## 2024-05-30 NOTE — Clinical Note
Luis Alberto Patton accompanied Adryan Gifford to the emergency department on 5/30/2024.    Return date if applicable: 05/31/2024    ?    If you have any questions or concerns, please don't hesitate to call.      Cee Ward PA-C

## 2024-05-30 NOTE — ED PROVIDER NOTES
"History  Chief Complaint   Patient presents with    Foot Injury     Patient reports that she tripped in her bathroom at 0430 and heard a \"pop\" in right foot.      48 year old female with PMH Hodgkin's lymphoma, migraines, prior foot fractures presenting for evaluation of right foot pain.  She notes injury to the right foot last year (per review of records she had fractures of 1st-5th metatarsals as well as Lisfranc dislocation).  She had surgery back in December on this foot due to the fractures.  She was seen by her podiatry in February of this year and states things were going well.  She notes this morning she was walking in the bathroom and there was some water on the floor.  She reports she slipped and twisted her foot.  She heard a pop.  She has been having pain throughout the foot since that time.  Denies swelling, numbness, tingling.  Pain worse with touch and movement, walking.  No specific treatments tried.  Denies any other injuries.  Did not strike head, no LOC, etc.      History provided by:  Medical records and patient   used: No        Prior to Admission Medications   Prescriptions Last Dose Informant Patient Reported? Taking?   Diclofenac Sodium (VOLTAREN) 1 %   No No   Sig: Apply 2 g topically 4 (four) times a day   gabapentin (Neurontin) 300 mg capsule  Self No No   Sig: Take 1 capsule (300 mg total) by mouth 3 (three) times a day   Patient not taking: Reported on 1/17/2024   gabapentin (Neurontin) 300 mg capsule   No No   Sig: Take 1 capsule (300 mg total) by mouth daily at bedtime   gabapentin (Neurontin) 300 mg capsule   No No   Sig: Take 1 capsule (300 mg total) by mouth 3 (three) times a day   gabapentin (Neurontin) 300 mg capsule   No Yes   Sig: Take 1 capsule (300 mg total) by mouth 3 (three) times a day   naloxone (NARCAN) 4 mg/0.1 mL nasal spray  Self No No   Sig: Administer 1 spray into a nostril. If no response after 2-3 minutes, give another dose in the other nostril " using a new spray.   Patient not taking: Reported on 12/22/2023   zolpidem (AMBIEN) 5 mg tablet   No No   Sig: Take 1 tablet (5 mg total) by mouth daily at bedtime as needed for sleep      Facility-Administered Medications: None       Past Medical History:   Diagnosis Date    Back pain     Cancer (HCC)     History of transfusion     Hodgkin's disease (HCC)     Hodgkin's lymphoma (HCC)     Migraine     Unspecified ovarian cysts        Past Surgical History:   Procedure Laterality Date    ANKLE LIGAMENT RECONSTRUCTION Left 07/1992    APPENDECTOMY      CHOLECYSTECTOMY      COLONOSCOPY      EGD      EXTERNAL FIXATOR (EX FIX) REMOVAL Right 12/28/2023    Procedure: REMOVAL EXTERNAL FIXATOR (EX FIX);  Surgeon: Jaime Long DPM;  Location: [facility];  Service: Podiatry    EXTERNAL FIXATOR APPLICATION Right 11/9/2023    Procedure: APPLICATION EXTERNAL FIXATION DEVICE LOWER EXTREMITY;  Surgeon: Jaime Long DPM;  Location: [facility];  Service: Podiatry    HYSTERECTOMY      partial    LYMPH NODE BIOPSY      left neck    MEDIPORT INSERTION, SINGLE      MEDIPORT REMOVAL      AL OPEN TREATMENT METATARSAL FRACTURE EACH Right 11/9/2023    Procedure: OPEN REDUCTION W/ INTERNAL FIXATION METATARSAL FRACTURES 2 AND 3.(ORIF) FOOT;  Surgeon: Jaime Long DPM;  Location: [facility];  Service: Podiatry    TUBAL LIGATION         Family History   Problem Relation Age of Onset    Heart disease Mother     Hypertension Mother     Asthma Mother     COPD Mother     Heart disease Father     Hypertension Father     Cancer Father      I have reviewed and agree with the history as documented.    E-Cigarette/Vaping    E-Cigarette Use Former User      E-Cigarette/Vaping Substances    Nicotine No     THC No     CBD No     Flavoring No     Other No     Unknown No      Social History     Tobacco Use    Smoking status: Every Day     Current packs/day: 0.00     Types: Cigarettes     Last attempt to quit: 3/24/2019      Years since quittin.1    Smokeless tobacco: Never    Tobacco comments:     Advised not to smoke prior to procedure  one pack daily   Vaping Use    Vaping status: Former   Substance Use Topics    Alcohol use: Not Currently    Drug use: Never     Comment: smokes cigarettes only       Review of Systems   Constitutional: Negative.  Negative for chills, fatigue and fever.   HENT: Negative.     Eyes: Negative.    Respiratory: Negative.  Negative for cough, shortness of breath and wheezing.    Cardiovascular: Negative.  Negative for chest pain, palpitations and leg swelling.   Gastrointestinal: Negative.  Negative for abdominal pain, diarrhea, nausea and vomiting.   Genitourinary: Negative.  Negative for dysuria and frequency.   Musculoskeletal:  Positive for arthralgias. Negative for back pain and neck pain.   Skin: Negative.  Negative for rash.   Neurological: Negative.  Negative for dizziness, syncope, light-headedness, numbness and headaches.   Psychiatric/Behavioral: Negative.     All other systems reviewed and are negative.      Physical Exam  Physical Exam  Vitals and nursing note reviewed.   Constitutional:       General: She is awake. She is not in acute distress.     Appearance: She is well-developed. She is not toxic-appearing.   HENT:      Head: Normocephalic and atraumatic.      Right Ear: Hearing and external ear normal.      Left Ear: Hearing and external ear normal.      Mouth/Throat:      Mouth: Oropharynx is clear and moist and mucous membranes are normal. Mucous membranes are moist.      Pharynx: Oropharynx is clear.   Eyes:      General: Lids are normal. No scleral icterus.     Extraocular Movements: EOM normal.      Conjunctiva/sclera: Conjunctivae normal.      Pupils: Pupils are equal, round, and reactive to light.   Neck:      Trachea: Trachea and phonation normal. No tracheal deviation.   Cardiovascular:      Rate and Rhythm: Normal rate and regular rhythm.      Pulses: Normal pulses.            Dorsalis pedis pulses are 2+ on the right side and 2+ on the left side.        Posterior tibial pulses are 2+ on the right side and 2+ on the left side.      Heart sounds: Normal heart sounds, S1 normal and S2 normal. No murmur heard.  Pulmonary:      Effort: Pulmonary effort is normal. No tachypnea or respiratory distress.      Breath sounds: Normal breath sounds.   Abdominal:      Tenderness: There is no CVA tenderness.   Musculoskeletal:         General: No edema.      Right lower leg: No edema.      Left lower leg: No edema.      Right ankle: Normal.      Right foot: Normal capillary refill. Tenderness present. No swelling, deformity, laceration or bony tenderness. Normal pulse.        Feet:       Comments: There are old surgical scars noted on dorsum of foot.  She has diminished ROM of the 2nd and 3rd toes which she notes has been chronic since prior surgeries.   Skin:     General: Skin is warm and dry.      Capillary Refill: Capillary refill takes less than 2 seconds.      Findings: No bruising, erythema, laceration or rash.   Neurological:      General: No focal deficit present.      Mental Status: She is alert and oriented to person, place, and time.      GCS: GCS eye subscore is 4. GCS verbal subscore is 5. GCS motor subscore is 6.      Cranial Nerves: No cranial nerve deficit.      Sensory: Sensation is intact. No sensory deficit.      Motor: Motor function is intact. No abnormal muscle tone.   Psychiatric:         Mood and Affect: Mood and affect and mood normal.         Speech: Speech normal.         Behavior: Behavior normal. Behavior is cooperative.         Vital Signs  ED Triage Vitals [05/30/24 1451]   Temperature Pulse Respirations Blood Pressure SpO2   97.6 °F (36.4 °C) (!) 115 16 144/97 97 %      Temp Source Heart Rate Source Patient Position - Orthostatic VS BP Location FiO2 (%)   Temporal Monitor Sitting Right arm --      Pain Score       7           Vitals:    05/30/24 1451 05/30/24 1530  05/30/24 1531   BP: 144/97 108/62    Pulse: (!) 115 93 99   Patient Position - Orthostatic VS: Sitting           Visual Acuity      ED Medications  Medications   oxyCODONE (ROXICODONE) IR tablet 5 mg (5 mg Oral Given 5/30/24 1505)       Diagnostic Studies  Results Reviewed       None                   XR foot 3+ views RIGHT    (Results Pending)              Procedures  Procedures         ED Course  ED Course as of 05/30/24 1605   Thu May 30, 2024   1458 Reviewed allergies with pt as she has numerous.  In the past has taken percocet as well as oxycodone.  She reports tylenol causes nausea, vomiting.   1532 XR foot 3+ views RIGHT  Independently viewed and interpreted by me - no fracture or acute osseous findings, similar appearance to Feb 2024, old healed fractures seen, there is small avulsion/density at base of 1st MTP seen previously likely related to old fracture; pending official read.   1533 Pt updated on results.  No noted acute fracture. There does not appear to be any significant interval change from most recent xray. Given extent of prior injury and new injury, did offer to perform CT scan which she declines at this time.  She notes she has various braces as well as surgical shoe at home.  She plans to follow up with her podiatry.  Requesting refill on her gabapentin, otherwise declines pain medication for at home.   1551 Pt ambulated out of department without assistance.                               SBIRT 20yo+      Flowsheet Row Most Recent Value   Initial Alcohol Screen: US AUDIT-C     1. How often do you have a drink containing alcohol? 0 Filed at: 05/30/2024 1451   2. How many drinks containing alcohol do you have on a typical day you are drinking?  0 Filed at: 05/30/2024 1451   3a. Male UNDER 65: How often do you have five or more drinks on one occasion? 0 Filed at: 05/30/2024 1451   3b. FEMALE Any Age, or MALE 65+: How often do you have 4 or more drinks on one occassion? 0 Filed at: 05/30/2024 1451    Audit-C Score 0 Filed at: 05/30/2024 1456   SPIKE: How many times in the past year have you...    Used an illegal drug or used a prescription medication for non-medical reasons? Never Filed at: 05/30/2024 1451                      Medical Decision Making  49 yo female presenting for evaluation of right foot pain after twisting injury today.  Has had prior injury to this foot and prior records and imaging reviewed.  Will obtain xray.  Will provide analgesia.    Xray obtained without noted acute fracture.  Xray findings appear stable from prior.  Pt aware that she will be contacted if any discrepancies noted once formally read.  I did offer to obtain CT of the foot today to further evaluate but she declines at this time.  Her pain appears adequately controlled and she is ambulatory.  Will discharge with continued symptomatic management and outpatient follow up with her podiatrist.  Will provide refill on her gabapentin.  PDMP was queried and no suspicious behaviors noted.    Reviewed RICE therapy.  Can continue to bear weight as tolerated.  She notes having various braces and shoes at home and will use as needed.  Strict return precautions outlined.  Advised outpatient follow up with her podiatrist or return to ER for change in condition as outlined. Pt verbalized understanding and had no further questions.    Please refer to above ER course for further details/discussion.      Problems Addressed:  Right foot pain: acute illness or injury  Sprain of right foot, initial encounter: acute illness or injury    Amount and/or Complexity of Data Reviewed  External Data Reviewed: notes.  Radiology: ordered and independent interpretation performed. Decision-making details documented in ED Course.    Risk  OTC drugs.  Prescription drug management.             Disposition  Final diagnoses:   Right foot pain   Sprain of right foot, initial encounter     Time reflects when diagnosis was documented in both MDM as applicable and  the Disposition within this note       Time User Action Codes Description Comment    5/30/2024  3:42 PM Cee Ward [M79.671] Right foot pain     5/30/2024  3:42 PM Cee Ward Add [S93.601A] Sprain of right foot, initial encounter     5/30/2024  3:43 PM Cee Ward Add [S92.901D] Closed fracture of right foot with routine healing           ED Disposition       ED Disposition   Discharge    Condition   Stable    Date/Time   Thu May 30, 2024 1542    Comment   Adryan Gifford discharge to home/self care.                   Follow-up Information       Follow up With Specialties Details Why Contact Info Additional Information    Jaime Long DPM Podiatry, Wound Care Schedule an appointment as soon as possible for a visit   [address] [place] [address]  Emily [place]CHO[place] [address]  [phone]       Critical [facility] hospital Emergency Department Emergency Medicine  As needed [address] [facility] [id number] [phone] Critical access hospital Emergency Department, [address], [place], [address]            Discharge Medication List as of 5/30/2024  3:44 PM        CONTINUE these medications which have CHANGED    Details   gabapentin (Neurontin) 300 mg capsule Take 1 capsule (300 mg total) by mouth 3 (three) times a day, Starting Thu 5/30/2024, Until Sat 6/29/2024, Normal           CONTINUE these medications which have NOT CHANGED    Details   Diclofenac Sodium (VOLTAREN) 1 % Apply 2 g topically 4 (four) times a day, Starting Thu 2/15/2024, Normal      naloxone (NARCAN) 4 mg/0.1 mL nasal spray Administer 1 spray into a nostril. If no response after 2-3 minutes, give another dose in the other nostril using a new spray., Normal      zolpidem (AMBIEN) 5 mg tablet Take 1 tablet (5 mg total) by mouth daily at bedtime as needed for sleep, Starting Tue 2/27/2024, Normal             No discharge procedures on file.    PDMP Review         Value Time User    PDMP Reviewed   Yes 5/30/2024  2:59 PM Cee Ward PA-C            ED Provider  Electronically Signed by             Cee Ward PA-C  05/30/24 1402

## 2025-01-12 ENCOUNTER — HOSPITAL ENCOUNTER (EMERGENCY)
Facility: HOSPITAL | Age: 50
Discharge: HOME | End: 2025-01-12
Attending: EMERGENCY MEDICINE | Admitting: EMERGENCY MEDICINE
Payer: COMMERCIAL

## 2025-01-12 VITALS
HEART RATE: 102 BPM | TEMPERATURE: 98.4 F | DIASTOLIC BLOOD PRESSURE: 85 MMHG | OXYGEN SATURATION: 92 % | RESPIRATION RATE: 18 BRPM | SYSTOLIC BLOOD PRESSURE: 134 MMHG

## 2025-01-12 DIAGNOSIS — H10.9 CONJUNCTIVITIS: Primary | ICD-10-CM

## 2025-01-12 PROCEDURE — 99284 EMERGENCY DEPT VISIT MOD MDM: CPT | Performed by: PHYSICIAN ASSISTANT

## 2025-01-12 PROCEDURE — 99282 EMERGENCY DEPT VISIT SF MDM: CPT

## 2025-01-12 RX ORDER — TOBRAMYCIN 3 MG/ML
1 SOLUTION/ DROPS OPHTHALMIC
Qty: 5 ML | Refills: 0 | Status: SHIPPED | OUTPATIENT
Start: 2025-01-12

## 2025-01-12 NOTE — ED PROVIDER NOTES
Time reflects when diagnosis was documented in both MDM as applicable and the Disposition within this note       Time User Action Codes Description Comment    1/12/2025  4:29 PM Edgar Sandoval Add [H10.9] Conjunctivitis           ED Disposition       ED Disposition   Discharge    Condition   Stable    Date/Time   Sun Jan 12, 2025  4:29 PM    Comment   Adryan Gifford discharge to home/self care.                   Assessment & Plan       Medical Decision Making  This is a 49-year-old female presenting for evaluation of left greater than right eye redness irritation and discharge.  Symptoms started within the last 24 hours.  Denies visual acuity deficit no fever.  She states she is finishing up battling a GI bug she had some vomiting diarrhea that has resolved.  Differential diagnosis includes acute viral conjunctivitis bacterial conjunctivitis fungal conjunctivitis  Warm body.  Examination findings most consistent with bacterial conjunctivitis will treat empirically.    Risk  Prescription drug management.             Medications - No data to display    ED Risk Strat Scores                          SBIRT 20yo+      Flowsheet Row Most Recent Value   Initial Alcohol Screen: US AUDIT-C     1. How often do you have a drink containing alcohol? 0 Filed at: 01/12/2025 1625   2. How many drinks containing alcohol do you have on a typical day you are drinking?  0 Filed at: 01/12/2025 1625   3b. FEMALE Any Age, or MALE 65+: How often do you have 4 or more drinks on one occassion? 0 Filed at: 01/12/2025 1625   Audit-C Score 0 Filed at: 01/12/2025 1625   SPIKE: How many times in the past year have you...    Used an illegal drug or used a prescription medication for non-medical reasons? Never Filed at: 01/12/2025 1625                            History of Present Illness       Chief Complaint   Patient presents with    Eye Problem     States that she woke up this morning with her left eye feeling itchy and gritty, with some  drainage. Thinks that she may have pink eye        Past Medical History:   Diagnosis Date    Back pain     Cancer (HCC)     History of transfusion     Hodgkin's disease (HCC)     Hodgkin's lymphoma (HCC)     Migraine     Unspecified ovarian cysts       Past Surgical History:   Procedure Laterality Date    ANKLE LIGAMENT RECONSTRUCTION Left 1992    APPENDECTOMY      CHOLECYSTECTOMY      COLONOSCOPY      EGD      EXTERNAL FIXATOR (EX FIX) REMOVAL Right 2023    Procedure: REMOVAL EXTERNAL FIXATOR (EX FIX);  Surgeon: Jaime Long DPM;  Location: [facility];  Service: Podiatry    EXTERNAL FIXATOR APPLICATION Right 2023    Procedure: APPLICATION EXTERNAL FIXATION DEVICE LOWER EXTREMITY;  Surgeon: Jaime Long DPM;  Location: [facility];  Service: Podiatry    HYSTERECTOMY      partial    LYMPH NODE BIOPSY      left neck    MEDIPORT INSERTION, SINGLE      MEDIPORT REMOVAL      IN OPEN TREATMENT METATARSAL FRACTURE EACH Right 2023    Procedure: OPEN REDUCTION W/ INTERNAL FIXATION METATARSAL FRACTURES 2 AND 3.(ORIF) FOOT;  Surgeon: Jaime Long DPM;  Location: [facility];  Service: Podiatry    TUBAL LIGATION        Family History   Problem Relation Age of Onset    Heart disease Mother     Hypertension Mother     Asthma Mother     COPD Mother     Heart disease Father     Hypertension Father     Cancer Father       Social History     Tobacco Use    Smoking status: Every Day     Current packs/day: 0.00     Types: Cigarettes     Last attempt to quit: 3/24/2019     Years since quittin.8    Smokeless tobacco: Never    Tobacco comments:     Advised not to smoke prior to procedure  one pack daily   Vaping Use    Vaping status: Former   Substance Use Topics    Alcohol use: Not Currently    Drug use: Never     Comment: smokes cigarettes only      E-Cigarette/Vaping    E-Cigarette Use Former User       E-Cigarette/Vaping Substances    Nicotine No     THC No     CBD No      Flavoring No     Other No     Unknown No       I have reviewed and agree with the history as documented.     This is a 49-year-old female presenting for evaluation of left greater than right eye redness irritation and discharge.  Symptoms started within the last 24 hours.  Denies visual acuity deficit no fever.  She states she is finishing up battling a GI bug she had some vomiting diarrhea that has resolved.      Eye Problem  Associated symptoms: no vomiting        Review of Systems   Constitutional:  Negative for chills and fever.   HENT:  Negative for ear pain and sore throat.    Eyes:  Negative for pain and visual disturbance.   Respiratory:  Negative for cough and shortness of breath.    Cardiovascular:  Negative for chest pain and palpitations.   Gastrointestinal:  Negative for abdominal pain and vomiting.   Genitourinary:  Negative for dysuria and hematuria.   Musculoskeletal:  Negative for arthralgias and back pain.   Skin:  Negative for color change and rash.   Neurological:  Negative for seizures and syncope.   All other systems reviewed and are negative.          Objective       ED Triage Vitals [01/12/25 1624]   Temperature Pulse Blood Pressure Respirations SpO2 Patient Position - Orthostatic VS   98.4 °F (36.9 °C) 102 135/86 18 92 % Sitting      Temp Source Heart Rate Source BP Location FiO2 (%) Pain Score    Temporal Monitor Right arm -- 4      Vitals      Date and Time Temp Pulse SpO2 Resp BP Pain Score FACES Pain Rating User   01/12/25 1630 98.4 °F (36.9 °C) 102 92 % 18 134/85 4 -- [place]   01/12/25 1624 98.4 °F (36.9 °C) 102 92 % 18 135/86 4 -- [place]            Physical Exam  Vitals reviewed.   Constitutional:       General: She is not in acute distress.     Appearance: Normal appearance. She is not ill-appearing, toxic-appearing or diaphoretic.   HENT:      Head: Normocephalic and atraumatic.      Right Ear: External ear normal.      Left Ear: External ear normal.   Eyes:      General: No scleral icterus.         Right eye: Discharge present.         Left eye: Discharge present.     Extraocular Movements: Extraocular movements intact.      Pupils: Pupils are equal, round, and reactive to light.      Comments: Pupillary response normal, no pain with extraocular movements left greater than right discharge noted with conjunctival injection.   Cardiovascular:      Rate and Rhythm: Normal rate.      Pulses: Normal pulses.   Pulmonary:      Effort: Pulmonary effort is normal. No respiratory distress.      Breath sounds: No stridor.   Musculoskeletal:         General: No deformity or signs of injury.      Cervical back: Normal range of motion. No rigidity.   Skin:     General: Skin is warm.      Coloration: Skin is not jaundiced.      Findings: No lesion or rash.   Neurological:      General: No focal deficit present.      Mental Status: She is alert and oriented to person, place, and time. Mental status is at baseline.      Gait: Gait normal.   Psychiatric:         Mood and Affect: Mood normal.         Thought Content: Thought content normal.         Judgment: Judgment normal.         Results Reviewed       None            No orders to display       Procedures    ED Medication and Procedure Management   Prior to Admission Medications   Prescriptions Last Dose Informant Patient Reported? Taking?   Diclofenac Sodium (VOLTAREN) 1 %   No No   Sig: Apply 2 g topically 4 (four) times a day   gabapentin (Neurontin) 300 mg capsule   No No   Sig: Take 1 capsule (300 mg total) by mouth 3 (three) times a day   naloxone (NARCAN) 4 mg/0.1 mL nasal spray  Self No No   Sig: Administer 1 spray into a nostril. If no response after 2-3 minutes, give another dose in the other nostril using a new spray.   Patient not taking: Reported on 12/22/2023   zolpidem (AMBIEN) 5 mg tablet   No No   Sig: Take 1 tablet (5 mg total) by mouth daily at bedtime as needed for sleep      Facility-Administered Medications: None     Discharge Medication List as  of 1/12/2025  4:29 PM        START taking these medications    Details   tobramycin (Tobrex) 0.3 % SOLN Administer 1 drop to both eyes every 4 (four) hours while awake, Starting Sun 1/12/2025, Normal           CONTINUE these medications which have NOT CHANGED    Details   Diclofenac Sodium (VOLTAREN) 1 % Apply 2 g topically 4 (four) times a day, Starting Thu 2/15/2024, Normal      gabapentin (Neurontin) 300 mg capsule Take 1 capsule (300 mg total) by mouth 3 (three) times a day, Starting Thu 5/30/2024, Until Sat 6/29/2024, Normal      naloxone (NARCAN) 4 mg/0.1 mL nasal spray Administer 1 spray into a nostril. If no response after 2-3 minutes, give another dose in the other nostril using a new spray., Normal      zolpidem (AMBIEN) 5 mg tablet Take 1 tablet (5 mg total) by mouth daily at bedtime as needed for sleep, Starting Tue 2/27/2024, Normal           No discharge procedures on file.  ED SEPSIS DOCUMENTATION   Time reflects when diagnosis was documented in both MDM as applicable and the Disposition within this note       Time User Action Codes Description Comment    1/12/2025  4:29 PM Edgar Sandoval Add [H10.9] Conjunctivitis                  Edgar Sandoval PA-C  01/12/25 7575

## 2025-01-14 ENCOUNTER — TELEPHONE (OUTPATIENT)
Age: 50
End: 2025-01-14

## 2025-01-14 NOTE — TELEPHONE ENCOUNTER
Pt states she had right foot surgery appx a year ago and was prescribed Gabapentin for the pain. Said that is the only thing that works. Pt states she has been having more pain recently in the right foot. Asking if PCP would be willing to prescribe her the Gabapentin.    Please advise      Pt also called requesting an appointment. States she has not been seen since 12/2023.  Pt c/o nausea, vomiting, constipation, fever and bodyaches.  Also, states she was seen in the ED over the weekend for pink eye both eyes and the eye drops are not working she was prescribed.  Pt requested an appointment for this Thursday, 1/16/25. She is scheduled at 11:40 am.

## 2025-01-15 NOTE — TELEPHONE ENCOUNTER
Patient called back. The earliest appointment she was able to get with podiatry was 01/30 with Dr osei. Will discuss more when she sees Jonh epperson tomorrow. Thank you!

## 2025-01-16 ENCOUNTER — DOCUMENTATION (OUTPATIENT)
Dept: HEMATOLOGY ONCOLOGY | Facility: CLINIC | Age: 50
End: 2025-01-16

## 2025-01-16 ENCOUNTER — OFFICE VISIT (OUTPATIENT)
Dept: FAMILY MEDICINE CLINIC | Facility: CLINIC | Age: 50
End: 2025-01-16
Payer: COMMERCIAL

## 2025-01-16 VITALS
WEIGHT: 187 LBS | TEMPERATURE: 99.1 F | SYSTOLIC BLOOD PRESSURE: 120 MMHG | BODY MASS INDEX: 31.16 KG/M2 | OXYGEN SATURATION: 96 % | DIASTOLIC BLOOD PRESSURE: 86 MMHG | HEIGHT: 65 IN | HEART RATE: 108 BPM

## 2025-01-16 DIAGNOSIS — Z12.11 SCREENING FOR COLON CANCER: ICD-10-CM

## 2025-01-16 DIAGNOSIS — M25.561 CHRONIC PAIN OF BOTH KNEES: ICD-10-CM

## 2025-01-16 DIAGNOSIS — G89.29 CHRONIC PAIN OF BOTH KNEES: ICD-10-CM

## 2025-01-16 DIAGNOSIS — M79.671 RIGHT FOOT PAIN: Primary | ICD-10-CM

## 2025-01-16 DIAGNOSIS — C81.90 HODGKIN LYMPHOMA, UNSPECIFIED HODGKIN LYMPHOMA TYPE, UNSPECIFIED BODY REGION (HCC): ICD-10-CM

## 2025-01-16 DIAGNOSIS — Z11.59 NEED FOR HEPATITIS C SCREENING TEST: ICD-10-CM

## 2025-01-16 DIAGNOSIS — Z12.31 ENCOUNTER FOR SCREENING MAMMOGRAM FOR MALIGNANT NEOPLASM OF BREAST: ICD-10-CM

## 2025-01-16 DIAGNOSIS — M25.562 CHRONIC PAIN OF BOTH KNEES: ICD-10-CM

## 2025-01-16 DIAGNOSIS — Z86.69 HISTORY OF ITCHING OF EYE: ICD-10-CM

## 2025-01-16 DIAGNOSIS — F17.200 CURRENT EVERY DAY SMOKER: ICD-10-CM

## 2025-01-16 PROCEDURE — 99214 OFFICE O/P EST MOD 30 MIN: CPT

## 2025-01-16 RX ORDER — KETOTIFEN FUMARATE 0.35 MG/ML
1 SOLUTION/ DROPS OPHTHALMIC 2 TIMES DAILY
Qty: 3 ML | Refills: 0 | Status: SHIPPED | OUTPATIENT
Start: 2025-01-16

## 2025-01-16 RX ORDER — GABAPENTIN 300 MG/1
300 CAPSULE ORAL 3 TIMES DAILY
Qty: 90 CAPSULE | Refills: 0 | Status: SHIPPED | OUTPATIENT
Start: 2025-01-16 | End: 2025-02-15

## 2025-01-16 NOTE — PROGRESS NOTES
Patient is [facility] for surveillance of 2003 history of Hodgkins. Will send for scheduling. Office responsible for any records.

## 2025-01-16 NOTE — PROGRESS NOTES
Name: Adryan Gifford      : 1975      MRN: [id number]  Encounter Provider: ANNE Menjivar  Encounter Date: 2025   Encounter department: [facility]  :  Assessment & Plan  Right foot pain    Following with Podiatry - next appt 25.  Resume Gabapentin at this time - last prescribed 24.  Denies re-injury or trauma.  Denies numbness/tingling.  F/u accordingly.    Orders:    gabapentin (Neurontin) 300 mg capsule; Take 1 capsule (300 mg total) by mouth 3 (three) times a day    Current every day smoker    Counseled.    Orders:    CBC and differential; Future    TSH, 3rd generation with Free T4 reflex; Future    Comprehensive metabolic panel; Future    Lipid panel; Future    Chronic pain of both knees      Orders:    Ambulatory Referral to Orthopedic Surgery; Future    Ambulatory Referral to Physical Therapy; Future    XR knee 3 vw left non injury; Future    XR knee 3 vw right non injury; Future    History of itching of eye    C/o BL eye itching.  Discussed daily antihistamine.    Orders:    Ketotifen Fumarate (ZADITOR) 0.035 % ophthalmic solution; Administer 1 drop to both eyes 2 (two) times a day    Hodgkin lymphoma, unspecified Hodgkin lymphoma type, unspecified body region (HCC)    Orders:    CBC and differential; Future    TSH, 3rd generation with Free T4 reflex; Future    Comprehensive metabolic panel; Future    Lipid panel; Future    Ambulatory Referral to Hematology / Oncology; Future    Encounter for screening mammogram for malignant neoplasm of breast    Orders:    Mammo screening bilateral w 3d and cad; Future    Screening for colon cancer    Orders:    Cologuard    Ambulatory Referral to Gastroenterology; Future    Need for hepatitis C screening test    Orders:    Hepatitis C antibody; Future          Depression Screening and Follow-up Plan: Patient was screened for depression during today's encounter. They screened negative with a PHQ-2 score of  "0.    Tobacco Cessation Counseling: Tobacco cessation counseling was provided. The patient is sincerely urged to quit consumption of tobacco. She is not ready to quit tobacco. Medication options and side effects of medication discussed. Patient refused medication.       History of Present Illness       Review of Systems   Constitutional:  Negative for chills, fatigue and fever.   HENT:  Negative for congestion, ear pain, facial swelling, hearing loss, rhinorrhea, sinus pressure, sneezing, sore throat and trouble swallowing.    Eyes:  Negative for pain, redness and visual disturbance.   Respiratory:  Negative for cough, chest tightness, shortness of breath and wheezing.    Cardiovascular:  Negative for chest pain and palpitations.   Gastrointestinal:  Negative for abdominal pain, diarrhea, nausea and vomiting.   Genitourinary:  Negative for dysuria, flank pain, hematuria and pelvic pain.   Musculoskeletal:  Positive for arthralgias. Negative for back pain and myalgias.   Skin:  Negative for color change and rash.   Neurological:  Negative for dizziness, seizures, syncope, weakness, light-headedness and headaches.   Psychiatric/Behavioral:  Negative for confusion, hallucinations and sleep disturbance. The patient is not nervous/anxious.    All other systems reviewed and are negative.      Objective   /86   Pulse (!) 108   Temp 99.1 °F (37.3 °C) (Tympanic)   Ht 5' 5\" (1.651 m)   Wt 84.8 kg (187 lb)   LMP  (LMP Unknown)   SpO2 96%   BMI 31.12 kg/m²      Physical Exam  Vitals and nursing note reviewed.   Constitutional:       General: She is not in acute distress.     Appearance: She is well-developed.   HENT:      Head: Normocephalic and atraumatic.      Right Ear: Hearing and tympanic membrane normal.      Left Ear: Hearing and tympanic membrane normal.      Nose: Nose normal.      Mouth/Throat:      Mouth: Mucous membranes are moist.      Dentition: Normal dentition.      Tongue: No lesions.      Pharynx: " Oropharynx is clear. Uvula midline. No oropharyngeal exudate.      Tonsils: No tonsillar exudate.   Eyes:      Extraocular Movements: Extraocular movements intact.      Conjunctiva/sclera: Conjunctivae normal.   Neck:      Vascular: No carotid bruit or JVD.   Cardiovascular:      Rate and Rhythm: Normal rate and regular rhythm.      Heart sounds: S1 normal and S2 normal. No murmur heard.  Pulmonary:      Effort: Pulmonary effort is normal. No tachypnea or respiratory distress.      Breath sounds: Normal breath sounds and air entry. No decreased breath sounds.   Chest:      Chest wall: No deformity or tenderness.   Abdominal:      General: Abdomen is flat. Bowel sounds are normal. There is no distension.      Palpations: Abdomen is soft.      Tenderness: There is no abdominal tenderness. There is no right CVA tenderness, left CVA tenderness or guarding.   Musculoskeletal:         General: No swelling.      Cervical back: Full passive range of motion without pain and neck supple.      Right lower leg: No edema.      Left lower leg: No edema.   Lymphadenopathy:      Cervical: No cervical adenopathy.   Skin:     General: Skin is warm and dry.      Capillary Refill: Capillary refill takes less than 2 seconds.      Findings: No rash.   Neurological:      Mental Status: She is alert and oriented to person, place, and time.      Cranial Nerves: Cranial nerves 2-12 are intact.      Sensory: Sensation is intact.      Motor: Motor function is intact.      Coordination: Coordination is intact.      Gait: Gait is intact.   Psychiatric:         Mood and Affect: Mood normal.         Behavior: Behavior is cooperative.

## 2025-01-16 NOTE — ASSESSMENT & PLAN NOTE
Orders:    CBC and differential; Future    TSH, 3rd generation with Free T4 reflex; Future    Comprehensive metabolic panel; Future    Lipid panel; Future    Ambulatory Referral to Hematology / Oncology; Future

## 2025-01-16 NOTE — ASSESSMENT & PLAN NOTE
Counseled.    Orders:    CBC and differential; Future    TSH, 3rd generation with Free T4 reflex; Future    Comprehensive metabolic panel; Future    Lipid panel; Future

## 2025-01-17 ENCOUNTER — TELEPHONE (OUTPATIENT)
Age: 50
End: 2025-01-17

## 2025-01-20 ENCOUNTER — TELEPHONE (OUTPATIENT)
Age: 50
End: 2025-01-20

## 2025-01-20 DIAGNOSIS — K21.9 GASTROESOPHAGEAL REFLUX DISEASE WITHOUT ESOPHAGITIS: ICD-10-CM

## 2025-01-20 DIAGNOSIS — K21.9 GASTROESOPHAGEAL REFLUX DISEASE WITHOUT ESOPHAGITIS: Primary | ICD-10-CM

## 2025-01-20 NOTE — TELEPHONE ENCOUNTER
Pt called in stating she saw Michelle on 01/16, but forgot to tell her about the issue of her burping, and when she burps they taste & smell really bad.    Pt states she was once on Omeprazole before b/c she had bad acid reflux, and that it help tremendously for her.     Pt inquires if Michelle could please send a script for Omeprazole?     Script going to the following pharmacy:  [facility] #[id number] - [place] - [address] [phone]     Please advise & call pt back with update on her request. Thank you!    Adryan: [phone]

## 2025-02-12 ENCOUNTER — TELEPHONE (OUTPATIENT)
Dept: HEMATOLOGY ONCOLOGY | Facility: CLINIC | Age: 50
End: 2025-02-12

## 2025-02-16 DIAGNOSIS — K21.9 GASTROESOPHAGEAL REFLUX DISEASE WITHOUT ESOPHAGITIS: ICD-10-CM

## 2025-02-24 ENCOUNTER — APPOINTMENT (EMERGENCY)
Dept: RADIOLOGY | Facility: HOSPITAL | Age: 50
End: 2025-02-24
Payer: COMMERCIAL

## 2025-02-24 ENCOUNTER — HOSPITAL ENCOUNTER (EMERGENCY)
Facility: HOSPITAL | Age: 50
Discharge: HOME | End: 2025-02-24
Attending: EMERGENCY MEDICINE
Payer: COMMERCIAL

## 2025-02-24 VITALS
WEIGHT: 188.71 LBS | TEMPERATURE: 97.2 F | BODY MASS INDEX: 31.4 KG/M2 | OXYGEN SATURATION: 94 % | SYSTOLIC BLOOD PRESSURE: 125 MMHG | DIASTOLIC BLOOD PRESSURE: 74 MMHG | RESPIRATION RATE: 18 BRPM | HEART RATE: 89 BPM

## 2025-02-24 DIAGNOSIS — M25.461 SWELLING OF RIGHT KNEE JOINT: ICD-10-CM

## 2025-02-24 DIAGNOSIS — M25.561 KNEE PAIN, RIGHT: Primary | ICD-10-CM

## 2025-02-24 PROCEDURE — 73564 X-RAY EXAM KNEE 4 OR MORE: CPT

## 2025-02-24 PROCEDURE — 99284 EMERGENCY DEPT VISIT MOD MDM: CPT | Performed by: EMERGENCY MEDICINE

## 2025-02-24 PROCEDURE — 99283 EMERGENCY DEPT VISIT LOW MDM: CPT

## 2025-02-24 RX ORDER — ACETAMINOPHEN 325 MG/1
975 TABLET ORAL ONCE
Status: COMPLETED | OUTPATIENT
Start: 2025-02-24 | End: 2025-02-24

## 2025-02-24 RX ADMIN — ACETAMINOPHEN 975 MG: 325 TABLET ORAL at 03:53

## 2025-02-24 NOTE — ED PROVIDER NOTES
Time reflects when diagnosis was documented in both MDM as applicable and the Disposition within this note       Time User Action Codes Description Comment    2/24/2025  3:56 AM Calixto Capps Add [M25.561] Knee pain, right     2/24/2025  4:17 AM Génesis Calixto Add [M25.461] Swelling of right knee joint           ED Disposition       ED Disposition   Discharge    Condition   Stable    Date/Time   Mon Feb 24, 2025  4:17 AM    Comment   Adryan Gifford discharge to home/self care.                   Assessment & Plan       Medical Decision Making  I reviewed the patient's medical chart, PMHx, prior encounters, medications.    My independent interpretation of R knee XR demonstrated:    My DDx includes: Osteoarthritis, joint swelling, gout, septic arthritis    I considered septic arthritis, however the patient does not have any fevers, no erythema to the joint, joint is not tense, she is able to actively flex and extend the knee.  I did offer joint aspiration however patient declined, stating that she does not feel comfortable with the procedure.  She is amenable to returning if she does develop redness, fevers, or worsening pain.  At this time she would like to watch this.    Will give Tylenol now, will perform x-ray.  Will recommend knee immobilizer with hinge she does not want crutches at this time.  Patient is agreeable with this plan.  Anticipate discharge with strict return precautions.    Amount and/or Complexity of Data Reviewed  Radiology: ordered and independent interpretation performed.    Risk  OTC drugs.             Medications   acetaminophen (TYLENOL) tablet 975 mg (975 mg Oral Given 2/24/25 0353)       ED Risk Strat Scores                            SBIRT 22yo+      Flowsheet Row Most Recent Value   Initial Alcohol Screen: US AUDIT-C     3b. FEMALE Any Age, or MALE 65+: How often do you have 4 or more drinks on one occassion? 0 Filed at: 02/24/2025 0325   Audit-C Score 0 Filed at: 02/24/2025  0325                            History of Present Illness       Chief Complaint   Patient presents with    Knee Pain     Right knee pain and swelling starting yesterday and hard to walk on       Past Medical History:   Diagnosis Date    Back pain     Cancer (HCC)     History of transfusion     Hodgkin's disease (HCC)     Hodgkin's lymphoma (HCC)     Migraine     Unspecified ovarian cysts       Past Surgical History:   Procedure Laterality Date    ANKLE LIGAMENT RECONSTRUCTION Left 1992    APPENDECTOMY      CHOLECYSTECTOMY      COLONOSCOPY      EGD      EXTERNAL FIXATOR (EX FIX) REMOVAL Right 2023    Procedure: REMOVAL EXTERNAL FIXATOR (EX FIX);  Surgeon: Jaime Long DPM;  Location: [facility];  Service: Podiatry    EXTERNAL FIXATOR APPLICATION Right 2023    Procedure: APPLICATION EXTERNAL FIXATION DEVICE LOWER EXTREMITY;  Surgeon: Jaime Long DPM;  Location: [facility];  Service: Podiatry    HYSTERECTOMY      partial    LYMPH NODE BIOPSY      left neck    MEDIPORT INSERTION, SINGLE      MEDIPORT REMOVAL      NV OPEN TREATMENT METATARSAL FRACTURE EACH Right 2023    Procedure: OPEN REDUCTION W/ INTERNAL FIXATION METATARSAL FRACTURES 2 AND 3.(ORIF) FOOT;  Surgeon: Jaime Long DPM;  Location: [facility];  Service: Podiatry    TUBAL LIGATION        Family History   Problem Relation Age of Onset    Heart disease Mother     Hypertension Mother     Asthma Mother     COPD Mother     Heart disease Father     Hypertension Father     Cancer Father       Social History     Tobacco Use    Smoking status: Every Day     Current packs/day: 0.00     Types: Cigarettes     Last attempt to quit: 3/24/2019     Years since quittin.9    Smokeless tobacco: Never    Tobacco comments:     Advised not to smoke prior to procedure  one pack daily   Vaping Use    Vaping status: Former   Substance Use Topics    Alcohol use: Not Currently    Drug use: Never     Comment: smokes  cigarettes only      E-Cigarette/Vaping    E-Cigarette Use Former User       E-Cigarette/Vaping Substances    Nicotine No     THC No     CBD No     Flavoring No     Other No     Unknown No       I have reviewed and agree with the history as documented.     49-year-old female with a history of chronic knee pain who presents for right knee pain, swelling.  Patient reports that she has had some worsening pain in the right knee from time to time, however it frequently going away on its own.  She reports that since yesterday, she has gradually had worsening pain and swelling of the right knee.  She denies any fevers.  She is able to bear weight however has pain doing so.  She is also able to flex extend the knee although has pain doing so.  She states that she has been taking Motrin at home with only mild relief.  She took 1 shortly before arrival.  No trauma.  ROS otherwise negative.        Review of Systems   Constitutional:  Negative for chills and fever.   HENT:  Negative for congestion, rhinorrhea and sore throat.    Respiratory:  Negative for cough and shortness of breath.    Cardiovascular:  Negative for chest pain and palpitations.   Gastrointestinal:  Negative for abdominal pain, constipation, diarrhea, nausea and vomiting.   Genitourinary:  Negative for difficulty urinating and flank pain.   Musculoskeletal:  Positive for arthralgias and joint swelling.   Neurological:  Negative for dizziness, weakness, light-headedness and headaches.   Psychiatric/Behavioral:  Negative for agitation, behavioral problems and confusion.    All other systems reviewed and are negative.          Objective       ED Triage Vitals [02/24/25 0324]   Temperature Pulse Blood Pressure Respirations SpO2 Patient Position - Orthostatic VS   (!) 97.2 °F (36.2 °C) 104 120/68 18 95 % Sitting      Temp Source Heart Rate Source BP Location FiO2 (%) Pain Score    Temporal Monitor Left arm -- 5      Vitals      Date and Time Temp Pulse SpO2 Resp BP  Pain Score FACES Pain Rating User   02/24/25 0400 -- 89 94 % 18 125/74 -- -- BMD   02/24/25 0353 -- -- -- -- -- 5 -- BMD   02/24/25 0324 97.2 °F (36.2 °C) 104 95 % 18 120/68 5 -- KTR            Physical Exam  Constitutional:       Appearance: She is well-developed.   HENT:      Head: Normocephalic and atraumatic.   Cardiovascular:      Rate and Rhythm: Normal rate and regular rhythm.      Heart sounds: Normal heart sounds. No murmur heard.     No friction rub.   Pulmonary:      Effort: Pulmonary effort is normal. No respiratory distress.      Breath sounds: Normal breath sounds. No wheezing or rales.   Abdominal:      General: Bowel sounds are normal. There is no distension.      Palpations: Abdomen is soft.      Tenderness: There is no abdominal tenderness.   Musculoskeletal:         General: Swelling and tenderness present. Normal range of motion.      Cervical back: Normal range of motion and neck supple.      Comments: Patient's right knee is mildly swollen, not tense, not erythematous.  She is able to flex and extend the knee although does have some discomfort doing so.  There is no calf tenderness.  No evidence of trauma.  No focal tenderness to the knee.   Skin:     General: Skin is warm.   Neurological:      Mental Status: She is alert and oriented to person, place, and time.      Coordination: Coordination normal.   Psychiatric:         Behavior: Behavior normal.         Thought Content: Thought content normal.         Judgment: Judgment normal.         Results Reviewed       None            XR knee 4+ vw right injury   ED Interpretation by Calixto Capps MD (02/24 9016)   No acute osseous abnormality.      Final Interpretation by Ramiro Santos MD (02/24 4629)      Moderate knee joint effusion without an acute osseous abnormality.         Computerized Assisted Algorithm (CAA) may have been used to analyze all applicable images.         Resident: Vin Koroma I, the attending  radiologist, have reviewed the images and agree with the final report above.      Workstation performed: [id number]             Procedures    ED Medication and Procedure Management   Prior to Admission Medications   Prescriptions Last Dose Informant Patient Reported? Taking?   Diclofenac Sodium (VOLTAREN) 1 %   No No   Sig: Apply 2 g topically 4 (four) times a day   Ketotifen Fumarate (ZADITOR) 0.035 % ophthalmic solution   No No   Sig: Administer 1 drop to both eyes 2 (two) times a day   gabapentin (Neurontin) 300 mg capsule   No No   Sig: Take 1 capsule (300 mg total) by mouth 3 (three) times a day   omeprazole (PriLOSEC) 20 mg delayed release capsule   No No   Sig: TAKE 1 CAPSULE BY MOUTH EVERY DAY   tobramycin (Tobrex) 0.3 % SOLN   No No   Sig: Administer 1 drop to both eyes every 4 (four) hours while awake      Facility-Administered Medications: None     Discharge Medication List as of 2/24/2025  4:17 AM        CONTINUE these medications which have NOT CHANGED    Details   Diclofenac Sodium (VOLTAREN) 1 % Apply 2 g topically 4 (four) times a day, Starting Thu 2/15/2024, Normal      gabapentin (Neurontin) 300 mg capsule Take 1 capsule (300 mg total) by mouth 3 (three) times a day, Starting Thu 1/16/2025, Until Sat 2/15/2025, Normal      Ketotifen Fumarate (ZADITOR) 0.035 % ophthalmic solution Administer 1 drop to both eyes 2 (two) times a day, Starting Thu 1/16/2025, Normal      omeprazole (PriLOSEC) 20 mg delayed release capsule TAKE 1 CAPSULE BY MOUTH EVERY DAY, Starting Mon 2/17/2025, Normal      tobramycin (Tobrex) 0.3 % SOLN Administer 1 drop to both eyes every 4 (four) hours while awake, Starting Sun 1/12/2025, Normal           No discharge procedures on file.  ED SEPSIS DOCUMENTATION   Time reflects when diagnosis was documented in both MDM as applicable and the Disposition within this note       Time User Action Codes Description Comment    2/24/2025  3:56 AM Calixto Capps Add [M25.561] Knee  pain, right     2/24/2025  4:17 AM Calixto Capps Add [M25.461] Swelling of right knee joint                  Calixto Capps MD  02/24/25 7626

## 2025-04-14 ENCOUNTER — TELEPHONE (OUTPATIENT)
Dept: FAMILY MEDICINE CLINIC | Facility: CLINIC | Age: 50
End: 2025-04-14

## 2025-04-14 NOTE — TELEPHONE ENCOUNTER
[facility]  Left message          Previous Messages       ----- Message -----  From: ANNE Menjivar  Sent: 4/12/2025   7:45 PM EDT  To: [facility]  Subject: LABS                                            Please remind patient to update labs prior to her physical in 2 weeks. Orders have been in place. Thank you.

## 2025-04-21 DIAGNOSIS — K21.9 GASTROESOPHAGEAL REFLUX DISEASE WITHOUT ESOPHAGITIS: ICD-10-CM

## 2025-04-21 RX ORDER — OMEPRAZOLE 20 MG/1
20 CAPSULE, DELAYED RELEASE ORAL DAILY
Qty: 30 CAPSULE | Refills: 0 | Status: SHIPPED | OUTPATIENT
Start: 2025-04-21

## 2025-07-16 DIAGNOSIS — Z12.12 SCREENING FOR COLORECTAL CANCER: ICD-10-CM

## 2025-07-16 DIAGNOSIS — Z12.31 ENCOUNTER FOR SCREENING MAMMOGRAM FOR BREAST CANCER: Primary | ICD-10-CM

## 2025-07-16 DIAGNOSIS — Z12.11 SCREENING FOR COLORECTAL CANCER: ICD-10-CM

## 2025-07-22 ENCOUNTER — APPOINTMENT (EMERGENCY)
Dept: RADIOLOGY | Facility: HOSPITAL | Age: 50
End: 2025-07-22
Payer: COMMERCIAL

## 2025-07-22 ENCOUNTER — HOSPITAL ENCOUNTER (EMERGENCY)
Facility: HOSPITAL | Age: 50
Discharge: HOME | End: 2025-07-22
Attending: EMERGENCY MEDICINE | Admitting: EMERGENCY MEDICINE
Payer: COMMERCIAL

## 2025-07-22 ENCOUNTER — APPOINTMENT (EMERGENCY)
Dept: CT IMAGING | Facility: HOSPITAL | Age: 50
End: 2025-07-22
Payer: COMMERCIAL

## 2025-07-22 VITALS
DIASTOLIC BLOOD PRESSURE: 83 MMHG | HEART RATE: 103 BPM | SYSTOLIC BLOOD PRESSURE: 163 MMHG | RESPIRATION RATE: 16 BRPM | WEIGHT: 150 LBS | HEIGHT: 65 IN | BODY MASS INDEX: 24.99 KG/M2 | TEMPERATURE: 98.1 F | OXYGEN SATURATION: 96 %

## 2025-07-22 DIAGNOSIS — R07.89 ATYPICAL CHEST PAIN: ICD-10-CM

## 2025-07-22 DIAGNOSIS — K29.70 GASTRITIS: ICD-10-CM

## 2025-07-22 DIAGNOSIS — R11.10 VOMITING: Primary | ICD-10-CM

## 2025-07-22 LAB
2HR DELTA HS TROPONIN: 1 NG/L
ALBUMIN SERPL BCG-MCNC: 4.6 G/DL (ref 3.5–5)
ALP SERPL-CCNC: 65 U/L (ref 34–104)
ALT SERPL W P-5'-P-CCNC: 12 U/L (ref 7–52)
ANION GAP SERPL CALCULATED.3IONS-SCNC: 14 MMOL/L (ref 4–13)
APTT PPP: 20 SECONDS (ref 23–34)
AST SERPL W P-5'-P-CCNC: 17 U/L (ref 13–39)
BASOPHILS # BLD MANUAL: 0 THOUSAND/UL (ref 0–0.1)
BASOPHILS NFR MAR MANUAL: 0 % (ref 0–1)
BILIRUB SERPL-MCNC: 0.57 MG/DL (ref 0.2–1)
BUN SERPL-MCNC: 9 MG/DL (ref 5–25)
CALCIUM SERPL-MCNC: 9.8 MG/DL (ref 8.4–10.2)
CARDIAC TROPONIN I PNL SERPL HS: 14 NG/L (ref ?–50)
CARDIAC TROPONIN I PNL SERPL HS: 15 NG/L (ref ?–50)
CHLORIDE SERPL-SCNC: 97 MMOL/L (ref 96–108)
CO2 SERPL-SCNC: 24 MMOL/L (ref 21–32)
CREAT SERPL-MCNC: 0.87 MG/DL (ref 0.6–1.3)
D DIMER PPP FEU-MCNC: 1.37 UG/ML FEU
DIFFERENTIAL COMMENT: ABNORMAL
EOSINOPHIL # BLD MANUAL: 0 THOUSAND/UL (ref 0–0.4)
EOSINOPHIL NFR BLD MANUAL: 0 % (ref 0–6)
ERYTHROCYTE [DISTWIDTH] IN BLOOD BY AUTOMATED COUNT: 12.3 % (ref 11.6–15.1)
GFR SERPL CREATININE-BSD FRML MDRD: 77 ML/MIN/1.73SQ M
GLUCOSE SERPL-MCNC: 180 MG/DL (ref 65–140)
HCT VFR BLD AUTO: 46.8 % (ref 34.8–46.1)
HGB BLD-MCNC: 16.2 G/DL (ref 11.5–15.4)
INR PPP: 1.05 (ref 0.85–1.19)
LYMPHOCYTES # BLD AUTO: 14 % (ref 14–44)
LYMPHOCYTES # BLD AUTO: 2.61 THOUSAND/UL (ref 0.6–4.47)
MCH RBC QN AUTO: 30.8 PG (ref 26.8–34.3)
MCHC RBC AUTO-ENTMCNC: 34.6 G/DL (ref 31.4–37.4)
MCV RBC AUTO: 89 FL (ref 82–98)
MONOCYTES # BLD AUTO: 0.87 THOUSAND/UL (ref 0–1.22)
MONOCYTES NFR BLD: 6 % (ref 4–12)
NEUTROPHILS # BLD MANUAL: 11 THOUSAND/UL (ref 1.85–7.62)
NEUTS BAND NFR BLD MANUAL: 2 % (ref 0–8)
NEUTS SEG NFR BLD AUTO: 74 % (ref 43–75)
PLATELET # BLD AUTO: 345 THOUSANDS/UL (ref 149–390)
PLATELET BLD QL SMEAR: ABNORMAL
PMV BLD AUTO: 11.2 FL (ref 8.9–12.7)
POTASSIUM SERPL-SCNC: 3.7 MMOL/L (ref 3.5–5.3)
PROT SERPL-MCNC: 7.8 G/DL (ref 6.4–8.4)
PROTHROMBIN TIME: 13.9 SECONDS (ref 12.3–15)
RBC # BLD AUTO: 5.26 MILLION/UL (ref 3.81–5.12)
RBC MORPH BLD: NORMAL
SODIUM SERPL-SCNC: 135 MMOL/L (ref 135–147)
VARIANT LYMPHS # BLD AUTO: 4 %
WBC # BLD AUTO: 14.48 THOUSAND/UL (ref 4.31–10.16)

## 2025-07-22 PROCEDURE — 99285 EMERGENCY DEPT VISIT HI MDM: CPT

## 2025-07-22 PROCEDURE — 71275 CT ANGIOGRAPHY CHEST: CPT

## 2025-07-22 PROCEDURE — 93005 ELECTROCARDIOGRAM TRACING: CPT

## 2025-07-22 PROCEDURE — 80053 COMPREHEN METABOLIC PANEL: CPT

## 2025-07-22 PROCEDURE — 85027 COMPLETE CBC AUTOMATED: CPT

## 2025-07-22 PROCEDURE — 96361 HYDRATE IV INFUSION ADD-ON: CPT

## 2025-07-22 PROCEDURE — 85730 THROMBOPLASTIN TIME PARTIAL: CPT

## 2025-07-22 PROCEDURE — 99285 EMERGENCY DEPT VISIT HI MDM: CPT | Performed by: EMERGENCY MEDICINE

## 2025-07-22 PROCEDURE — 85610 PROTHROMBIN TIME: CPT

## 2025-07-22 PROCEDURE — 96375 TX/PRO/DX INJ NEW DRUG ADDON: CPT

## 2025-07-22 PROCEDURE — 85025 COMPLETE CBC W/AUTO DIFF WBC: CPT

## 2025-07-22 PROCEDURE — 85379 FIBRIN DEGRADATION QUANT: CPT | Performed by: EMERGENCY MEDICINE

## 2025-07-22 PROCEDURE — 84484 ASSAY OF TROPONIN QUANT: CPT

## 2025-07-22 PROCEDURE — 85007 BL SMEAR W/DIFF WBC COUNT: CPT

## 2025-07-22 PROCEDURE — 96365 THER/PROPH/DIAG IV INF INIT: CPT

## 2025-07-22 PROCEDURE — 36415 COLL VENOUS BLD VENIPUNCTURE: CPT

## 2025-07-22 PROCEDURE — 74174 CTA ABD&PLVS W/CONTRAST: CPT

## 2025-07-22 PROCEDURE — 71045 X-RAY EXAM CHEST 1 VIEW: CPT

## 2025-07-22 PROCEDURE — 96376 TX/PRO/DX INJ SAME DRUG ADON: CPT

## 2025-07-22 RX ORDER — FAMOTIDINE 10 MG/ML
20 INJECTION, SOLUTION INTRAVENOUS ONCE
Status: COMPLETED | OUTPATIENT
Start: 2025-07-22 | End: 2025-07-22

## 2025-07-22 RX ORDER — ONDANSETRON 2 MG/ML
4 INJECTION INTRAMUSCULAR; INTRAVENOUS ONCE
Status: COMPLETED | OUTPATIENT
Start: 2025-07-22 | End: 2025-07-22

## 2025-07-22 RX ORDER — KETOROLAC TROMETHAMINE 30 MG/ML
15 INJECTION, SOLUTION INTRAMUSCULAR; INTRAVENOUS ONCE
Status: DISCONTINUED | OUTPATIENT
Start: 2025-07-22 | End: 2025-07-23 | Stop reason: HOSPADM

## 2025-07-22 RX ORDER — HYDROMORPHONE HCL IN WATER/PF 6 MG/30 ML
0.2 PATIENT CONTROLLED ANALGESIA SYRINGE INTRAVENOUS ONCE
Status: COMPLETED | OUTPATIENT
Start: 2025-07-22 | End: 2025-07-22

## 2025-07-22 RX ORDER — OMEPRAZOLE 20 MG/1
20 CAPSULE, DELAYED RELEASE ORAL DAILY
Qty: 30 CAPSULE | Refills: 0 | Status: SHIPPED | OUTPATIENT
Start: 2025-07-22 | End: 2025-08-21

## 2025-07-22 RX ORDER — ONDANSETRON 4 MG/1
4 TABLET, ORALLY DISINTEGRATING ORAL EVERY 6 HOURS PRN
Qty: 10 TABLET | Refills: 0 | Status: SHIPPED | OUTPATIENT
Start: 2025-07-22

## 2025-07-22 RX ORDER — ACETAMINOPHEN 10 MG/ML
1000 INJECTION, SOLUTION INTRAVENOUS ONCE
Status: COMPLETED | OUTPATIENT
Start: 2025-07-22 | End: 2025-07-22

## 2025-07-22 RX ORDER — FENTANYL CITRATE 50 UG/ML
50 INJECTION, SOLUTION INTRAMUSCULAR; INTRAVENOUS ONCE
Refills: 0 | Status: COMPLETED | OUTPATIENT
Start: 2025-07-22 | End: 2025-07-22

## 2025-07-22 RX ADMIN — ONDANSETRON 4 MG: 2 INJECTION INTRAMUSCULAR; INTRAVENOUS at 21:14

## 2025-07-22 RX ADMIN — ACETAMINOPHEN 1000 MG: 10 INJECTION INTRAVENOUS at 21:17

## 2025-07-22 RX ADMIN — FENTANYL CITRATE 50 MCG: 50 INJECTION INTRAMUSCULAR; INTRAVENOUS at 21:15

## 2025-07-22 RX ADMIN — IOHEXOL 100 ML: 350 INJECTION, SOLUTION INTRAVENOUS at 22:00

## 2025-07-22 RX ADMIN — FAMOTIDINE 20 MG: 10 INJECTION, SOLUTION INTRAVENOUS at 23:11

## 2025-07-22 RX ADMIN — ONDANSETRON 4 MG: 2 INJECTION INTRAMUSCULAR; INTRAVENOUS at 22:37

## 2025-07-22 RX ADMIN — HYDROMORPHONE HYDROCHLORIDE 0.2 MG: 0.2 INJECTION, SOLUTION INTRAMUSCULAR; INTRAVENOUS; SUBCUTANEOUS at 22:22

## 2025-07-22 RX ADMIN — SODIUM CHLORIDE 1000 ML: 0.9 INJECTION, SOLUTION INTRAVENOUS at 21:50

## 2025-07-23 LAB
ATRIAL RATE: 125 BPM
ATRIAL RATE: 99 BPM
P AXIS: 67 DEGREES
P AXIS: 70 DEGREES
PR INTERVAL: 126 MS
PR INTERVAL: 136 MS
QRS AXIS: 63 DEGREES
QRS AXIS: 79 DEGREES
QRSD INTERVAL: 86 MS
QRSD INTERVAL: 86 MS
QT INTERVAL: 340 MS
QT INTERVAL: 410 MS
QTC INTERVAL: 490 MS
QTC INTERVAL: 526 MS
T WAVE AXIS: 47 DEGREES
T WAVE AXIS: 92 DEGREES
VENTRICULAR RATE: 125 BPM
VENTRICULAR RATE: 99 BPM

## 2025-07-23 PROCEDURE — 93010 ELECTROCARDIOGRAM REPORT: CPT | Performed by: INTERNAL MEDICINE

## 2025-07-23 NOTE — ED PROVIDER NOTES
"Time reflects when diagnosis was documented in both MDM as applicable and the Disposition within this note       Time User Action Codes Description Comment    7/22/2025 11:08 PM Lee Miguel [R11.10] Vomiting     7/22/2025 11:08 PM Lee Miguel [R07.89] Atypical chest pain     7/22/2025 11:08 PM Lee Miguel [K29.70] Gastritis           ED Disposition       ED Disposition   Discharge    Condition   Stable    Date/Time   Tue Jul 22, 2025 11:08 PM    Comment   Amber Collins discharge to home/self care.                   Assessment & Plan       Medical Decision Making  Amount and/or Complexity of Data Reviewed  Labs: ordered.  Radiology: ordered and independent interpretation performed.    Risk  Prescription drug management.    This is a 50-year-old female presents emerged part with nausea vomiting illness with atypical nonexertional chest pain without any associate diaphoresis shortness of breath or presyncopal symptoms.  Please see HPI for specifics, D-dimer cannot be age-adjusted was positive, CT ordered showed mild mid distal esophageal wall thickening compatible with esophagitis, IV Pepcid ordered, as needed pain medicine given, patient stable for discharge follow-up with GI via ambulatory referral.  Cardiac enzymes x 2 negative, have a low clinical suspicion for ACS.    Focused differential diagnosis in this patient is as follows after initial evaluation: Apical chest pain versus cardiac chest pain versus pulmonary embolism versus aortic dissection versus gastritis versus esophageal irritation versus GERD, prior history of appendectomy and cholecystectomy    Portions of the record may have been created with voice recognition software. Occasional wrong word or \"sound a like\" substitutions may have occurred due to the inherent limitations of voice recognition software. Read the chart carefully and recognize, using context, where substitutions have occurred.         ED Course as of 07/22/25 2324 "   Tue Jul 22, 2025 2311 Patient is reassessed, CT results consistent with esophageal irritation from vomiting with possible gastritis, patient is feeling better we will start patient on PPI, d/c to home w/ f/up with GI via ambulatory referral.       Medications   ketorolac (TORADOL) injection 15 mg (15 mg Intravenous Not Given 7/22/25 2224)   ondansetron (ZOFRAN) injection 4 mg (4 mg Intravenous Given 7/22/25 2114)   acetaminophen (Ofirmev) injection 1,000 mg (0 mg Intravenous Stopped 7/22/25 2149)   fentaNYL injection 50 mcg (50 mcg Intravenous Given 7/22/25 2115)   sodium chloride 0.9 % bolus 1,000 mL (1,000 mL Intravenous New Bag 7/22/25 2150)   iohexol (OMNIPAQUE) 350 MG/ML injection (MULTI-DOSE) 100 mL (100 mL Intravenous Given 7/22/25 2200)   HYDROmorphone HCl (DILAUDID) injection 0.2 mg (0.2 mg Intravenous Given 7/22/25 2222)   ondansetron (ZOFRAN) injection 4 mg (4 mg Intravenous Given 7/22/25 2237)   Famotidine (PF) (PEPCID) injection 20 mg (20 mg Intravenous Given 7/22/25 2311)       ED Risk Strat Scores   HEART Risk Score      Flowsheet Row Most Recent Value   Heart Score Risk Calculator    History 0 Filed at: 07/22/2025 2105   ECG 0 Filed at: 07/22/2025 2105   Age 1 Filed at: 07/22/2025 2105   Risk Factors 1 Filed at: 07/22/2025 2105   Troponin 1 Filed at: 07/22/2025 2105   HEART Score 3 Filed at: 07/22/2025 2105          HEART Risk Score      Flowsheet Row Most Recent Value   Heart Score Risk Calculator    History 0 Filed at: 07/22/2025 2105   ECG 0 Filed at: 07/22/2025 2105   Age 1 Filed at: 07/22/2025 2105   Risk Factors 1 Filed at: 07/22/2025 2105   Troponin 1 Filed at: 07/22/2025 2105   HEART Score 3 Filed at: 07/22/2025 2105                      No data recorded    PERC Rule for PE      Flowsheet Row Most Recent Value   PERC Rule for PE    Age >=50 0 Filed at: 07/22/2025 2324   HR >=100 1 Filed at: 07/22/2025 2324   O2 Sat on room air < 95% 0 Filed at: 07/22/2025 2324   History of PE or DVT 0  Filed at: 07/22/2025 2324   Recent trauma or surgery 0 Filed at: 07/22/2025 2324   Hemoptysis 0 Filed at: 07/22/2025 2324   Exogenous estrogen 0 Filed at: 07/22/2025 2324   Unilateral leg swelling 0 Filed at: 07/22/2025 2324   PERC Rule for PE Results 1 Filed at: 07/22/2025 2324            SBIRT 20yo+      Flowsheet Row Most Recent Value   Initial Alcohol Screen: US AUDIT-C     1. How often do you have a drink containing alcohol? 0 Filed at: 07/22/2025 2027   2. How many drinks containing alcohol do you have on a typical day you are drinking?  0 Filed at: 07/22/2025 2027   3a. Male UNDER 65: How often do you have five or more drinks on one occasion? 0 Filed at: 07/22/2025 2027   3b. FEMALE Any Age, or MALE 65+: How often do you have 4 or more drinks on one occassion? 0 Filed at: 07/22/2025 2027   Audit-C Score 0 Filed at: 07/22/2025 2027   MOY: How many times in the past year have you...    Used an illegal drug or used a prescription medication for non-medical reasons? Never Filed at: 07/22/2025 2027            Wells' Criteria for PE      Flowsheet Row Most Recent Value   Wells' Criteria for PE    Clinical signs and symptoms of DVT 0 Filed at: 07/22/2025 2324   PE is primary diagnosis or equally likely 0 Filed at: 07/22/2025 2324   HR >100 1.5 Filed at: 07/22/2025 2324   Immobilization at least 3 days or Surgery in the previous 4 weeks 0 Filed at: 07/22/2025 2324   Previous, objectively diagnosed PE or DVT 0 Filed at: 07/22/2025 2324   Hemoptysis 0 Filed at: 07/22/2025 2324   Malignancy with treatment within 6 months or palliative 0 Filed at: 07/22/2025 2324   Mando' Criteria Total 1.5 Filed at: 07/22/2025 2324          Wells' Criteria for DVT      Flowsheet Row Most Recent Value   Mando' Criteria for DVT    Active cancer Treatment or palliation within 6 months 0 Filed at: 07/22/2025 2324   Bedridden recently >3 days or major surgery within 12 weeks 0 Filed at: 07/22/2025 2324   Calf swelling >3 cm compared to the  other leg 0 Filed at: 07/22/2025 2324   Entire leg swollen 0 Filed at: 07/22/2025 2324   Collateral (nonvaricose) superficial veins present 0 Filed at: 07/22/2025 2324   Localized tenderness along the deep venous system 0 Filed at: 07/22/2025 2324   Pitting edema, confined to symptomatic leg 0 Filed at: 07/22/2025 2324   Paralysis, paresis, or recent plaster immobilization of the lower extremity 0 Filed at: 07/22/2025 2324   Previously documented DVT 0 Filed at: 07/22/2025 2324   Alternative diagnosis to DVT as likely or more likely 0 Filed at: 07/22/2025 2324   Wells DVT Critera Score 0 Filed at: 07/22/2025 2324                      History of Present Illness       Chief Complaint   Patient presents with    Chest Pain     Patient started with dizziness and lightheadedness 2 days ago and last night started with vomiting and this morning started with chest pain.     Abdominal Pain       Past Medical History[1]   Past Surgical History[2]   Family History[3]   Social History[4]   No existing history information found.   No existing history information found.   I have reviewed and agree with the history as documented.     HPI    This is a nontoxic-appearing, 50-year-old female presents emergency department with a abrupt onset of a nausea vomiting illness that started last evening, recent travel was down to [facility] the second week of July no other sick contacts.  She is reporting substernal chest pain after vomiting along with lower abdominal pain.  No hemoptysis, no melena, no hematochezia.  No fever, no chills, no recent antibiotic exposure.    Remaining 12 point review of systems unremarkable.      Review of Systems   Constitutional: Negative.  Negative for chills, diaphoresis, fatigue and fever.   HENT: Negative.     Eyes: Negative.    Respiratory:  Positive for chest tightness and shortness of breath.    Cardiovascular:  Positive for chest pain.   Gastrointestinal:  Positive for nausea and vomiting.  Negative for abdominal pain and diarrhea.   Endocrine: Negative.    Genitourinary: Negative.    Musculoskeletal: Negative.  Negative for back pain.   Skin: Negative.  Negative for pallor, rash and wound.   Allergic/Immunologic: Negative.    Neurological: Negative.    Hematological: Negative.    Psychiatric/Behavioral: Negative.             Objective       ED Triage Vitals [07/22/25 2025]   Temperature Pulse Blood Pressure Respirations SpO2 Patient Position - Orthostatic VS   98.1 °F (36.7 °C) (!) 122 (!) 181/81 19 99 % Sitting      Temp Source Heart Rate Source BP Location FiO2 (%) Pain Score    Temporal Monitor Left arm -- 8      Vitals      Date and Time Temp Pulse SpO2 Resp BP Pain Score FACES Pain Rating User   07/22/25 2222 -- -- -- -- -- 7 --    07/22/25 2115 -- -- -- -- -- 8 --    07/22/25 2100 -- 119 96 % 20 152/74 -- -- JR   07/22/25 2030 -- 119 99 % 20 181/81 -- -- EM   07/22/25 2025 98.1 °F (36.7 °C) 122 99 % 19 181/81 8 -- EM            Physical Exam  Vitals and nursing note reviewed.   Constitutional:       General: She is not in acute distress.     Appearance: She is well-developed and normal weight. She is not ill-appearing, toxic-appearing or diaphoretic.   HENT:      Head: Normocephalic and atraumatic.     Eyes:      Extraocular Movements: Extraocular movements intact.      Pupils: Pupils are equal, round, and reactive to light.       Cardiovascular:      Rate and Rhythm: Normal rate.      Heart sounds: Normal heart sounds.   Pulmonary:      Effort: Pulmonary effort is normal.      Breath sounds: Normal breath sounds.   Abdominal:      General: Bowel sounds are normal.      Palpations: Abdomen is soft.     Musculoskeletal:         General: Normal range of motion.      Cervical back: Normal range of motion and neck supple.      Right lower leg: No tenderness. No edema.      Left lower leg: No tenderness. No edema.     Skin:     General: Skin is warm.      Capillary Refill: Capillary refill takes  less than 2 seconds.     Neurological:      General: No focal deficit present.      Mental Status: She is alert and oriented to person, place, and time.     Psychiatric:         Mood and Affect: Mood normal.         Results Reviewed       Procedure Component Value Units Date/Time    HS Troponin I 2hr [[id number]]  (Normal) Collected: 07/22/25 2236    Lab Status: Final result Specimen: Blood from Arm, Left Updated: 07/22/25 2304     hs TnI 2hr 15 ng/L      Delta 2hr hsTnI 1 ng/L     HS Troponin I 4hr [[id number]]     Lab Status: No result Specimen: Blood     D-dimer, quantitative [[id number]]  (Abnormal) Collected: 07/22/25 2032    Lab Status: Final result Specimen: Blood from Arm, Left Updated: 07/22/25 2138     D-Dimer, Quant 1.37 ug/ml FEU     Narrative:      In the evaluation for possible pulmonary embolism, in the appropriate (Well's Score of 4 or less) patient, the age adjusted d-dimer cutoff for this patient can be calculated as:    Age x 0.01 (in ug/mL) for Age-adjusted D-dimer exclusion threshold for a patient over 50 years.    Manual Differential(PHLEBS Do Not Order) [[id number]]  (Abnormal) Collected: 07/22/25 2032    Lab Status: Final result Specimen: Blood from Arm, Left Updated: 07/22/25 2136     Segmented % 74 %      Bands % 2 %      Lymphocytes % 14 %      Monocytes % 6 %      Eosinophils % 0 %      Basophils % 0 %      Atypical Lymphocytes % 4 %      Absolute Neutrophils 11.00 Thousand/uL      Absolute Lymphocytes 2.61 Thousand/uL      Absolute Monocytes 0.87 Thousand/uL      Absolute Eosinophils 0.00 Thousand/uL      Absolute Basophils 0.00 Thousand/uL      Total Counted --     RBC Morphology Normal     Platelet Estimate Increased     Differential Comment see note    HS Troponin 0hr (reflex protocol) [[id number]]  (Normal) Collected: 07/22/25 2032    Lab Status: Final result Specimen: Blood from Arm, Left Updated: 07/22/25 2058     hs TnI 0hr 14 ng/L     APTT [[id number]]  (Abnormal) Collected: 07/22/25  2032    Lab Status: Final result Specimen: Blood from Arm, Left Updated: 07/22/25 2057     PTT 20 seconds     Protime-INR [[id number]]  (Normal) Collected: 07/22/25 2032    Lab Status: Final result Specimen: Blood from Arm, Left Updated: 07/22/25 2057     Protime 13.9 seconds      INR 1.05    Narrative:      INR Therapeutic Range    Indication                                             INR Range      Atrial Fibrillation                                               2.0-3.0  Hypercoagulable State                                    2.0.2.3  Left Ventricular Asist Device                            2.0-3.0  Mechanical Heart Valve                                  -    Aortic(with afib, MI, embolism, HF, LA enlargement,    and/or coagulopathy)                                     2.0-3.0 (2.5-3.5)     Mitral                                                             2.5-3.5  Prosthetic/Bioprosthetic Heart Valve               2.0-3.0  Venous thromboembolism (VTE: VT, PE        2.0-3.0    Comprehensive metabolic panel [[id number]]  (Abnormal) Collected: 07/22/25 2032    Lab Status: Final result Specimen: Blood from Arm, Left Updated: 07/22/25 2051     Sodium 135 mmol/L      Potassium 3.7 mmol/L      Chloride 97 mmol/L      CO2 24 mmol/L      ANION GAP 14 mmol/L      BUN 9 mg/dL      Creatinine 0.87 mg/dL      Glucose 180 mg/dL      Calcium 9.8 mg/dL      AST 17 U/L      ALT 12 U/L      Alkaline Phosphatase 65 U/L      Total Protein 7.8 g/dL      Albumin 4.6 g/dL      Total Bilirubin 0.57 mg/dL      eGFR 77 ml/min/1.73sq m     Narrative:      National Kid[employer] Disease Foundation guidelines for Chronic Kidney Disease (CKD):     Stage 1 with normal or high GFR (GFR > 90 mL/min/1.73 square meters)    Stage 2 Mild CKD (GFR = 60-89 mL/min/1.73 square meters)    Stage 3A Moderate CKD (GFR = 45-59 mL/min/1.73 square meters)    Stage 3B Moderate CKD (GFR = 30-44 mL/min/1.73 square meters)    Stage 4 Severe CKD (GFR = 15-29 mL/min/1.73  square meters)    Stage 5 End Stage CKD (GFR <15 mL/min/1.73 square meters)  Note: GFR calculation is accurate only with a steady state creatinine    CBC and differential [[id number]]  (Abnormal) Collected: 07/22/25 2032    Lab Status: Final result Specimen: Blood from Arm, Left Updated: 07/22/25 2046     WBC 14.48 Thousand/uL      RBC 5.26 Million/uL      Hemoglobin 16.2 g/dL      Hematocrit 46.8 %      MCV 89 fL      MCH 30.8 pg      MCHC 34.6 g/dL      RDW 12.3 %      MPV 11.2 fL      Platelets 345 Thousands/uL             CTA chest abdomen pelvis w wo contrast   Final Interpretation by Adrianne Davis MD (07/22 2235)      No evidence of aortic aneurysm or dissection      Mid and distal esophageal wall thickening compatible with esophagitis. Consider gastroenterology consult.            Computerized Assisted Algorithm (CAA) may have aided analysis of applicable images.         Workstation performed: [id number]         XR chest 1 view portable   ED Interpretation by Lee Miguel III, DO (07/22 2059)   Stat portable chest x-ray shows no acute fractures osseous abnormalities or pneumothorax.          ECG 12 Lead Documentation Only    Date/Time: 7/22/2025 8:25 PM    Performed by: Lee Miguel III, DO  Authorized by: Lee Miguel III, DO    Indications / Diagnosis:  Chest pain  ECG reviewed by me, the ED Provider: no    Patient location:  ED  Comments:      Personally reviewed this EKG that was performed and the patient July 22, 2025, EKG was completed at 8:25 PM and interpreted by same time, sinus tachycardia with a ventricular rate of 125 bpm, remaining portion Nevils within normal limits.    No diffuse elevations to indicate pericarditis.  No coved ST elevations greater than 2mm with negative T waves in V1-3 to indicate concern for brugada.  No biphasic T waves in V2, V3 to indicate Wellens (critical stenosis of LAD).   No elevation in aVR or deviation when compared to V1 (can be  associated with ST depression in I,II, V4-6 when left main occlusion is present).   ECG 12 Lead Documentation Only    Date/Time: 7/22/2025 10:32 PM    Performed by: Lee Miguel III, DO  Authorized by: Lee Miguel III, DO    Indications / Diagnosis:  Chest pain  ECG reviewed by me, the ED Provider: no    Patient location:  ED  Comments:      I personally reviewed this EKG that was performed the patient July 22, 2025, EKG was completed at 10:32 PM inter by me the same time, sinus rhythm with a ventricular rate of 99 bpm remaining portion Nevils with normal limits.    No diffuse elevations to indicate pericarditis.  No coved ST elevations greater than 2mm with negative T waves in V1-3 to indicate concern for brugada.  No biphasic T waves in V2, V3 to indicate Wellens (critical stenosis of LAD).   No elevation in aVR or deviation when compared to V1 (can be associated with ST depression in I,II, V4-6 when left main occlusion is present).       ED Medication and Procedure Management   None     Patient's Medications   Discharge Prescriptions    OMEPRAZOLE (PRILOSEC) 20 MG DELAYED RELEASE CAPSULE    Take 1 capsule (20 mg total) by mouth daily       Start Date: 7/22/2025 End Date: 8/21/2025       Order Dose: 20 mg       Quantity: 30 capsule    Refills: 0    ONDANSETRON (ZOFRAN-ODT) 4 MG DISINTEGRATING TABLET    Take 1 tablet (4 mg total) by mouth every 6 (six) hours as needed for vomiting or nausea for up to 10 doses       Start Date: 7/22/2025 End Date: --       Order Dose: 4 mg       Quantity: 10 tablet    Refills: 0       ED SEPSIS DOCUMENTATION   Time reflects when diagnosis was documented in both MDM as applicable and the Disposition within this note       Time User Action Codes Description Comment    7/22/2025 11:08 PM Lee Miguel [R11.10] Vomiting     7/22/2025 11:08 PM Lee Miguel [R07.89] Atypical chest pain     7/22/2025 11:08 PM Lee Miguel [K29.70] Gastritis                         [1] No past medical history on file.  [2] No past surgical history on file.  [3] No family history on file.  [4]         Lee Miguel III, DO  07/22/25 [id number]

## (undated) DEVICE — STRETCH BANDAGE: Brand: CURITY

## (undated) DEVICE — GLOVE INDICATOR PI UNDERGLOVE SZ 7 BLUE

## (undated) DEVICE — 4-PORT MANIFOLD: Brand: NEPTUNE 2

## (undated) DEVICE — SUT ETHILON 3-0 PS-1 18 IN 1663H

## (undated) DEVICE — NEEDLE 18 G X 1 1/2

## (undated) DEVICE — SUT VICRYL 2-0 REEL 54 IN J286G

## (undated) DEVICE — SKN PREP SPNG STKS PVP PNT STR: Brand: MEDLINE INDUSTRIES, INC.

## (undated) DEVICE — CURITY NON-ADHERENT STRIPS: Brand: CURITY

## (undated) DEVICE — BETHLEHEM UNIVERSAL  MIONR EXT: Brand: CARDINAL HEALTH

## (undated) DEVICE — ZIMMER® STERILE DISPOSABLE TOURNIQUET CUFF, DUAL PORT, SINGLE BLADDER, 18 IN. (46 CM)

## (undated) DEVICE — GLOVE SRG BIOGEL 8

## (undated) DEVICE — DRAPE C-ARM X-RAY

## (undated) DEVICE — SUT VICRYL 3-0 PS-2 27 IN J427H

## (undated) DEVICE — GLOVE SRG LF STRL BGL SKNSNS 7 PF

## (undated) DEVICE — ACE WRAP 6 IN STERILE

## (undated) DEVICE — SUT MONOCRYL 4-0 PS-2 27 IN Y426H

## (undated) DEVICE — COBAN 4 IN STERILE

## (undated) DEVICE — PADDING CAST 4 IN  COTTON STRL

## (undated) DEVICE — SUT VICRYL 3-0 SH 27 IN J416H

## (undated) DEVICE — CAST PADDING 4 IN SYNTHETIC NON-STRL

## (undated) DEVICE — ABDOMINAL PAD: Brand: DERMACEA

## (undated) DEVICE — SUT ETHILON 3-0 PS-1 18 IN 1663G

## (undated) DEVICE — PAD CAST 4 IN COTTON NON STERILE

## (undated) DEVICE — SUT VICRYL 2-0 SH 27 IN UNDYED J417H

## (undated) DEVICE — SPLINT 4 X 30 IN PRECUT SYNTHETIC

## (undated) DEVICE — STOCKINETTE REGULAR

## (undated) DEVICE — 10FR FRAZIER SUCTION HANDLE: Brand: CARDINAL HEALTH

## (undated) DEVICE — MEDI-VAC YANKAUER SUCTION HANDLE W/BULBOUS AND CONTROL VENT: Brand: CARDINAL HEALTH

## (undated) DEVICE — CHLORAPREP HI-LITE 26ML ORANGE

## (undated) DEVICE — SPONGE LAP 18 X 18 IN STRL RFD

## (undated) DEVICE — SYRINGE 10ML LL

## (undated) DEVICE — GLOVE SRG BIOGEL 7

## (undated) DEVICE — Device

## (undated) DEVICE — COBAN 6 IN STERILE

## (undated) DEVICE — ACE WRAP 4 IN STERILE

## (undated) DEVICE — NEEDLE 25G X 1 1/2

## (undated) DEVICE — DRAPE C-ARMOUR

## (undated) DEVICE — GAUZE SPONGES,16 PLY: Brand: CURITY

## (undated) DEVICE — KERLIX BANDAGE ROLL: Brand: KERLIX

## (undated) DEVICE — GLOVE INDICATOR PI UNDERGLOVE SZ 8 BLUE

## (undated) DEVICE — PLUMEPEN PRO 10FT

## (undated) DEVICE — ALL PURPOSE SPONGES,NON-WOVEN, 4 PLY: Brand: CURITY

## (undated) DEVICE — SUT VICRYL 4-0 PS-2 27 IN J426H

## (undated) DEVICE — INTENDED FOR TISSUE SEPARATION, AND OTHER PROCEDURES THAT REQUIRE A SHARP SURGICAL BLADE TO PUNCTURE OR CUT.: Brand: BARD-PARKER ® CARBON RIB-BACK BLADES

## (undated) DEVICE — CULTURE TUBE AEROBIC

## (undated) DEVICE — CULTURE TUBE ANAEROBIC

## (undated) DEVICE — SPECIMEN CONTAINER STERILE PEEL PACK